# Patient Record
Sex: FEMALE | Race: WHITE | NOT HISPANIC OR LATINO | Employment: OTHER | URBAN - METROPOLITAN AREA
[De-identification: names, ages, dates, MRNs, and addresses within clinical notes are randomized per-mention and may not be internally consistent; named-entity substitution may affect disease eponyms.]

---

## 2018-08-29 ENCOUNTER — ANESTHESIA EVENT (OUTPATIENT)
Dept: OPERATING ROOM | Facility: HOSPITAL | Age: 63
Setting detail: HOSPITAL OUTPATIENT SURGERY
End: 2018-08-29
Payer: COMMERCIAL

## 2018-08-29 ENCOUNTER — APPOINTMENT (OUTPATIENT)
Dept: RADIOLOGY | Facility: HOSPITAL | Age: 63
Setting detail: HOSPITAL OUTPATIENT SURGERY
End: 2018-08-29
Attending: STUDENT IN AN ORGANIZED HEALTH CARE EDUCATION/TRAINING PROGRAM
Payer: COMMERCIAL

## 2018-08-29 ENCOUNTER — HOSPITAL ENCOUNTER (OUTPATIENT)
Facility: HOSPITAL | Age: 63
Discharge: HOME | End: 2018-08-30
Attending: ORTHOPAEDIC SURGERY | Admitting: ORTHOPAEDIC SURGERY
Payer: COMMERCIAL

## 2018-08-29 PROBLEM — Z96.652 S/P LEFT UNICOMPARTMENTAL KNEE REPLACEMENT: Status: ACTIVE | Noted: 2018-08-29

## 2018-08-29 LAB
GLUCOSE BLD-MCNC: 100 MG/DL (ref 70–99)
GLUCOSE BLD-MCNC: 87 MG/DL (ref 70–99)
POCT TEST: ABNORMAL
POCT TEST: NORMAL

## 2018-08-29 PROCEDURE — 63600000 HC DRUGS/DETAIL CODE: Performed by: STUDENT IN AN ORGANIZED HEALTH CARE EDUCATION/TRAINING PROGRAM

## 2018-08-29 PROCEDURE — 63600000 HC DRUGS/DETAIL CODE: Performed by: ORTHOPAEDIC SURGERY

## 2018-08-29 PROCEDURE — 97165 OT EVAL LOW COMPLEX 30 MIN: CPT | Mod: GO

## 2018-08-29 PROCEDURE — 0SRD0L9 REPLACEMENT OF LEFT KNEE JOINT WITH MEDIAL UNICONDYLAR SYNTHETIC SUBSTITUTE, CEMENTED, OPEN APPROACH: ICD-10-PCS | Performed by: ORTHOPAEDIC SURGERY

## 2018-08-29 PROCEDURE — 71000001 HC PACU PHASE 1 INITIAL 30MIN: Performed by: ORTHOPAEDIC SURGERY

## 2018-08-29 PROCEDURE — 37000010 HC ANESTHESIA SPINAL: Performed by: ORTHOPAEDIC SURGERY

## 2018-08-29 PROCEDURE — 71000011 HC PACU PHASE 1 EA ADDL MIN: Performed by: ORTHOPAEDIC SURGERY

## 2018-08-29 PROCEDURE — 63700000 HC SELF-ADMINISTRABLE DRUG

## 2018-08-29 PROCEDURE — 27200000 HC STERILE SUPPLY: Performed by: ORTHOPAEDIC SURGERY

## 2018-08-29 PROCEDURE — G8987 SELF CARE CURRENT STATUS: HCPCS | Mod: GO,CJ

## 2018-08-29 PROCEDURE — 37000010 ANESTHESIA SPINAL BLOCK: Performed by: ANESTHESIOLOGY

## 2018-08-29 PROCEDURE — 36000016 HC OR LEVEL 6 EA ADDL MIN: Performed by: ORTHOPAEDIC SURGERY

## 2018-08-29 PROCEDURE — 73560 X-RAY EXAM OF KNEE 1 OR 2: CPT | Mod: LT

## 2018-08-29 PROCEDURE — 37000003 ANESTHESIA PERIPHERAL BLOCK: Performed by: ANESTHESIOLOGY

## 2018-08-29 PROCEDURE — G8988 SELF CARE GOAL STATUS: HCPCS | Mod: GO,CI

## 2018-08-29 PROCEDURE — 36000006 HC OR LEVEL 6 INITIAL 30MIN: Performed by: ORTHOPAEDIC SURGERY

## 2018-08-29 PROCEDURE — C1776 JOINT DEVICE (IMPLANTABLE): HCPCS | Performed by: ORTHOPAEDIC SURGERY

## 2018-08-29 PROCEDURE — 25800000 HC PHARMACY IV SOLUTIONS: Performed by: ORTHOPAEDIC SURGERY

## 2018-08-29 PROCEDURE — 25000000 HC PHARMACY GENERAL: Performed by: ANESTHESIOLOGY

## 2018-08-29 PROCEDURE — 25800000 HC PHARMACY IV SOLUTIONS: Performed by: STUDENT IN AN ORGANIZED HEALTH CARE EDUCATION/TRAINING PROGRAM

## 2018-08-29 PROCEDURE — G8978 MOBILITY CURRENT STATUS: HCPCS | Mod: GP,CI | Performed by: PHYSICAL THERAPIST

## 2018-08-29 PROCEDURE — C1713 ANCHOR/SCREW BN/BN,TIS/BN: HCPCS | Performed by: ORTHOPAEDIC SURGERY

## 2018-08-29 PROCEDURE — 63600000 HC DRUGS/DETAIL CODE: Performed by: ANESTHESIOLOGY

## 2018-08-29 PROCEDURE — 97161 PT EVAL LOW COMPLEX 20 MIN: CPT | Mod: GP | Performed by: PHYSICAL THERAPIST

## 2018-08-29 PROCEDURE — 25800000 HC PHARMACY IV SOLUTIONS: Performed by: ANESTHESIOLOGY

## 2018-08-29 PROCEDURE — 63700000 HC SELF-ADMINISTRABLE DRUG: Performed by: STUDENT IN AN ORGANIZED HEALTH CARE EDUCATION/TRAINING PROGRAM

## 2018-08-29 PROCEDURE — 25000000 HC PHARMACY GENERAL: Performed by: ORTHOPAEDIC SURGERY

## 2018-08-29 PROCEDURE — G8979 MOBILITY GOAL STATUS: HCPCS | Mod: GP,CH | Performed by: PHYSICAL THERAPIST

## 2018-08-29 PROCEDURE — 97116 GAIT TRAINING THERAPY: CPT | Mod: GP | Performed by: PHYSICAL THERAPIST

## 2018-08-29 DEVICE — IMPLANTABLE DEVICE: Type: IMPLANTABLE DEVICE | Site: KNEE | Status: FUNCTIONAL

## 2018-08-29 DEVICE — CEMENT BONE SIMPLEX FULL DOSE: Type: IMPLANTABLE DEVICE | Site: KNEE | Status: FUNCTIONAL

## 2018-08-29 RX ORDER — AMOXICILLIN 250 MG
1 CAPSULE ORAL 2 TIMES DAILY
Status: DISCONTINUED | OUTPATIENT
Start: 2018-08-29 | End: 2018-08-30 | Stop reason: HOSPADM

## 2018-08-29 RX ORDER — PREGABALIN 75 MG/1
75 CAPSULE ORAL ONCE
Status: COMPLETED | OUTPATIENT
Start: 2018-08-29 | End: 2018-08-29

## 2018-08-29 RX ORDER — POLYETHYLENE GLYCOL 3350 17 G/17G
17 POWDER, FOR SOLUTION ORAL DAILY
Qty: 3 PACKET | Refills: 0
Start: 2018-08-29 | End: 2018-09-01

## 2018-08-29 RX ORDER — DEXTROSE 40 %
15-30 GEL (GRAM) ORAL AS NEEDED
Status: DISCONTINUED | OUTPATIENT
Start: 2018-08-29 | End: 2018-08-30 | Stop reason: HOSPADM

## 2018-08-29 RX ORDER — SODIUM CHLORIDE 9 MG/ML
INJECTION, SOLUTION INTRAVENOUS CONTINUOUS
Status: ACTIVE | OUTPATIENT
Start: 2018-08-29 | End: 2018-08-30

## 2018-08-29 RX ORDER — PROPOFOL 10 MG/ML
INJECTION, EMULSION INTRAVENOUS CONTINUOUS PRN
Status: DISCONTINUED | OUTPATIENT
Start: 2018-08-29 | End: 2018-08-29 | Stop reason: SURG

## 2018-08-29 RX ORDER — SODIUM CHLORIDE, SODIUM GLUCONATE, SODIUM ACETATE, POTASSIUM CHLORIDE AND MAGNESIUM CHLORIDE 30; 37; 368; 526; 502 MG/100ML; MG/100ML; MG/100ML; MG/100ML; MG/100ML
INJECTION, SOLUTION INTRAVENOUS CONTINUOUS PRN
Status: DISCONTINUED | OUTPATIENT
Start: 2018-08-29 | End: 2018-08-29 | Stop reason: SURG

## 2018-08-29 RX ORDER — ALUMINUM HYDROXIDE, MAGNESIUM HYDROXIDE, AND SIMETHICONE 1200; 120; 1200 MG/30ML; MG/30ML; MG/30ML
30 SUSPENSION ORAL EVERY 4 HOURS PRN
Status: DISCONTINUED | OUTPATIENT
Start: 2018-08-29 | End: 2018-08-30 | Stop reason: HOSPADM

## 2018-08-29 RX ORDER — FENTANYL CITRATE 50 UG/ML
50 INJECTION, SOLUTION INTRAMUSCULAR; INTRAVENOUS
Status: DISCONTINUED | OUTPATIENT
Start: 2018-08-29 | End: 2018-08-29 | Stop reason: HOSPADM

## 2018-08-29 RX ORDER — ONDANSETRON HYDROCHLORIDE 2 MG/ML
INJECTION, SOLUTION INTRAVENOUS AS NEEDED
Status: DISCONTINUED | OUTPATIENT
Start: 2018-08-29 | End: 2018-08-29 | Stop reason: SURG

## 2018-08-29 RX ORDER — ACETAMINOPHEN 325 MG/1
TABLET ORAL
Status: COMPLETED
Start: 2018-08-29 | End: 2018-08-29

## 2018-08-29 RX ORDER — FENTANYL CITRATE 50 UG/ML
INJECTION, SOLUTION INTRAMUSCULAR; INTRAVENOUS AS NEEDED
Status: DISCONTINUED | OUTPATIENT
Start: 2018-08-29 | End: 2018-08-29 | Stop reason: HOSPADM

## 2018-08-29 RX ORDER — ASPIRIN 81 MG/1
81 TABLET ORAL 2 TIMES DAILY
Qty: 60 TABLET | Refills: 0 | Status: SHIPPED | OUTPATIENT
Start: 2018-08-29 | End: 2018-09-28

## 2018-08-29 RX ORDER — EPHEDRINE SULFATE 50 MG/ML
INJECTION, SOLUTION INTRAVENOUS AS NEEDED
Status: DISCONTINUED | OUTPATIENT
Start: 2018-08-29 | End: 2018-08-29 | Stop reason: SURG

## 2018-08-29 RX ORDER — ACETAMINOPHEN 325 MG/1
650 TABLET ORAL
Status: DISCONTINUED | OUTPATIENT
Start: 2018-08-29 | End: 2018-08-30 | Stop reason: HOSPADM

## 2018-08-29 RX ORDER — DEXTROSE 50 % IN WATER (D50W) INTRAVENOUS SYRINGE
25 AS NEEDED
Status: DISCONTINUED | OUTPATIENT
Start: 2018-08-29 | End: 2018-08-30 | Stop reason: HOSPADM

## 2018-08-29 RX ORDER — NAPROXEN SODIUM 220 MG/1
81 TABLET, FILM COATED ORAL 2 TIMES DAILY
Status: DISCONTINUED | OUTPATIENT
Start: 2018-08-29 | End: 2018-08-30 | Stop reason: HOSPADM

## 2018-08-29 RX ORDER — IBUPROFEN 200 MG
16-32 TABLET ORAL AS NEEDED
Status: DISCONTINUED | OUTPATIENT
Start: 2018-08-29 | End: 2018-08-30 | Stop reason: HOSPADM

## 2018-08-29 RX ORDER — ACETAMINOPHEN 325 MG/1
650 TABLET ORAL EVERY 6 HOURS PRN
Qty: 40 TABLET | Refills: 0
Start: 2018-08-29 | End: 2018-09-28

## 2018-08-29 RX ORDER — CEFAZOLIN SODIUM 2 G/50ML
2 SOLUTION INTRAVENOUS ONCE
Status: COMPLETED | OUTPATIENT
Start: 2018-08-29 | End: 2018-08-29

## 2018-08-29 RX ORDER — ONDANSETRON 4 MG/1
4 TABLET, ORALLY DISINTEGRATING ORAL EVERY 8 HOURS PRN
Status: DISCONTINUED | OUTPATIENT
Start: 2018-08-29 | End: 2018-08-30 | Stop reason: HOSPADM

## 2018-08-29 RX ORDER — FENTANYL CITRATE 50 UG/ML
INJECTION, SOLUTION INTRAMUSCULAR; INTRAVENOUS AS NEEDED
Status: DISCONTINUED | OUTPATIENT
Start: 2018-08-29 | End: 2018-08-29 | Stop reason: SURG

## 2018-08-29 RX ORDER — PROPOFOL 10 MG/ML
INJECTION, EMULSION INTRAVENOUS AS NEEDED
Status: DISCONTINUED | OUTPATIENT
Start: 2018-08-29 | End: 2018-08-29 | Stop reason: SURG

## 2018-08-29 RX ORDER — AMOXICILLIN 250 MG
1 CAPSULE ORAL 2 TIMES DAILY
Qty: 60 TABLET | Refills: 0 | Status: SHIPPED | OUTPATIENT
Start: 2018-08-29 | End: 2018-09-28

## 2018-08-29 RX ORDER — LIDOCAINE HYDROCHLORIDE AND EPINEPHRINE 10; 10 UG/ML; MG/ML
INJECTION, SOLUTION INFILTRATION; PERINEURAL AS NEEDED
Status: DISCONTINUED | OUTPATIENT
Start: 2018-08-29 | End: 2018-08-29 | Stop reason: HOSPADM

## 2018-08-29 RX ORDER — LOSARTAN POTASSIUM 50 MG/1
50 TABLET ORAL DAILY
Status: DISCONTINUED | OUTPATIENT
Start: 2018-08-30 | End: 2018-08-30 | Stop reason: HOSPADM

## 2018-08-29 RX ORDER — DEXAMETHASONE SODIUM PHOSPHATE 4 MG/ML
INJECTION, SOLUTION INTRA-ARTICULAR; INTRALESIONAL; INTRAMUSCULAR; INTRAVENOUS; SOFT TISSUE AS NEEDED
Status: DISCONTINUED | OUTPATIENT
Start: 2018-08-29 | End: 2018-08-29 | Stop reason: SURG

## 2018-08-29 RX ORDER — ACETAMINOPHEN 325 MG/1
650 TABLET ORAL EVERY 4 HOURS PRN
Status: DISCONTINUED | OUTPATIENT
Start: 2018-08-29 | End: 2018-08-30 | Stop reason: HOSPADM

## 2018-08-29 RX ORDER — OXYCODONE HYDROCHLORIDE 5 MG/1
5-10 TABLET ORAL EVERY 4 HOURS PRN
Status: DISCONTINUED | OUTPATIENT
Start: 2018-08-29 | End: 2018-08-30 | Stop reason: HOSPADM

## 2018-08-29 RX ORDER — KETOROLAC TROMETHAMINE 15 MG/ML
15 INJECTION, SOLUTION INTRAMUSCULAR; INTRAVENOUS
Status: DISCONTINUED | OUTPATIENT
Start: 2018-08-29 | End: 2018-08-30 | Stop reason: HOSPADM

## 2018-08-29 RX ORDER — SCOPOLAMINE 1 MG/3D
1 PATCH, EXTENDED RELEASE TRANSDERMAL ONCE
Status: DISCONTINUED | OUTPATIENT
Start: 2018-08-29 | End: 2018-08-29

## 2018-08-29 RX ORDER — DIPHENHYDRAMINE HYDROCHLORIDE 50 MG/ML
25 INJECTION INTRAMUSCULAR; INTRAVENOUS EVERY 6 HOURS PRN
Status: DISCONTINUED | OUTPATIENT
Start: 2018-08-29 | End: 2018-08-30 | Stop reason: HOSPADM

## 2018-08-29 RX ORDER — PREGABALIN 75 MG/1
CAPSULE ORAL
Status: COMPLETED
Start: 2018-08-29 | End: 2018-08-29

## 2018-08-29 RX ORDER — ACETAMINOPHEN 325 MG/1
975 TABLET ORAL ONCE
Status: COMPLETED | OUTPATIENT
Start: 2018-08-29 | End: 2018-08-29

## 2018-08-29 RX ORDER — ROPIVACAINE HYDROCHLORIDE 2 MG/ML
INJECTION, SOLUTION EPIDURAL; INFILTRATION; PERINEURAL AS NEEDED
Status: DISCONTINUED | OUTPATIENT
Start: 2018-08-29 | End: 2018-08-29 | Stop reason: HOSPADM

## 2018-08-29 RX ORDER — BUPIVACAINE HYDROCHLORIDE 7.5 MG/ML
INJECTION, SOLUTION INTRASPINAL AS NEEDED
Status: DISCONTINUED | OUTPATIENT
Start: 2018-08-29 | End: 2018-08-29 | Stop reason: SURG

## 2018-08-29 RX ORDER — ONDANSETRON HYDROCHLORIDE 2 MG/ML
4 INJECTION, SOLUTION INTRAVENOUS EVERY 8 HOURS PRN
Status: DISCONTINUED | OUTPATIENT
Start: 2018-08-29 | End: 2018-08-30 | Stop reason: HOSPADM

## 2018-08-29 RX ORDER — CEFAZOLIN SODIUM 2 G/50ML
SOLUTION INTRAVENOUS
Status: COMPLETED
Start: 2018-08-29 | End: 2018-08-29

## 2018-08-29 RX ORDER — BISACODYL 10 MG/1
10 SUPPOSITORY RECTAL DAILY PRN
Status: DISCONTINUED | OUTPATIENT
Start: 2018-08-29 | End: 2018-08-30 | Stop reason: HOSPADM

## 2018-08-29 RX ORDER — CELECOXIB 200 MG/1
400 CAPSULE ORAL ONCE
Status: COMPLETED | OUTPATIENT
Start: 2018-08-29 | End: 2018-08-29

## 2018-08-29 RX ORDER — HYDROMORPHONE HYDROCHLORIDE 1 MG/ML
0.5 INJECTION, SOLUTION INTRAMUSCULAR; INTRAVENOUS; SUBCUTANEOUS
Status: DISCONTINUED | OUTPATIENT
Start: 2018-08-29 | End: 2018-08-29 | Stop reason: HOSPADM

## 2018-08-29 RX ORDER — ALBUTEROL SULFATE 90 UG/1
2 INHALANT RESPIRATORY (INHALATION) EVERY 6 HOURS PRN
Status: DISCONTINUED | OUTPATIENT
Start: 2018-08-29 | End: 2018-08-30 | Stop reason: HOSPADM

## 2018-08-29 RX ORDER — SCOPOLAMINE 1 MG/3D
PATCH, EXTENDED RELEASE TRANSDERMAL
Status: DISCONTINUED
Start: 2018-08-29 | End: 2018-08-30 | Stop reason: HOSPADM

## 2018-08-29 RX ORDER — SODIUM CHLORIDE, SODIUM GLUCONATE, SODIUM ACETATE, POTASSIUM CHLORIDE AND MAGNESIUM CHLORIDE 30; 37; 368; 526; 502 MG/100ML; MG/100ML; MG/100ML; MG/100ML; MG/100ML
125 INJECTION, SOLUTION INTRAVENOUS CONTINUOUS
Status: DISCONTINUED | OUTPATIENT
Start: 2018-08-29 | End: 2018-08-30 | Stop reason: HOSPADM

## 2018-08-29 RX ORDER — POLYETHYLENE GLYCOL 3350 17 G/17G
17 POWDER, FOR SOLUTION ORAL DAILY
Status: DISCONTINUED | OUTPATIENT
Start: 2018-08-30 | End: 2018-08-30 | Stop reason: HOSPADM

## 2018-08-29 RX ORDER — CEFAZOLIN SODIUM 2 G/50ML
2 SOLUTION INTRAVENOUS
Status: COMPLETED | OUTPATIENT
Start: 2018-08-29 | End: 2018-08-29

## 2018-08-29 RX ORDER — FENTANYL CITRATE 50 UG/ML
50 INJECTION, SOLUTION INTRAMUSCULAR; INTRAVENOUS ONCE
Status: COMPLETED | OUTPATIENT
Start: 2018-08-29 | End: 2018-08-29

## 2018-08-29 RX ORDER — DEXAMETHASONE SODIUM PHOSPHATE 4 MG/ML
8 INJECTION, SOLUTION INTRA-ARTICULAR; INTRALESIONAL; INTRAMUSCULAR; INTRAVENOUS; SOFT TISSUE ONCE
Status: COMPLETED | OUTPATIENT
Start: 2018-08-30 | End: 2018-08-30

## 2018-08-29 RX ORDER — CELECOXIB 200 MG/1
CAPSULE ORAL
Status: COMPLETED
Start: 2018-08-29 | End: 2018-08-29

## 2018-08-29 RX ORDER — ONDANSETRON HYDROCHLORIDE 2 MG/ML
4 INJECTION, SOLUTION INTRAVENOUS
Status: DISCONTINUED | OUTPATIENT
Start: 2018-08-29 | End: 2018-08-29 | Stop reason: HOSPADM

## 2018-08-29 RX ORDER — DIPHENHYDRAMINE HCL 25 MG
25 CAPSULE ORAL EVERY 6 HOURS PRN
Status: DISCONTINUED | OUTPATIENT
Start: 2018-08-29 | End: 2018-08-30 | Stop reason: HOSPADM

## 2018-08-29 RX ORDER — OXYCODONE HYDROCHLORIDE 5 MG/1
5-10 TABLET ORAL EVERY 4 HOURS PRN
Qty: 40 TABLET | Refills: 0
Start: 2018-08-29 | End: 2018-09-03

## 2018-08-29 RX ORDER — SODIUM CHLORIDE 9 MG/ML
INJECTION, SOLUTION INTRAVENOUS CONTINUOUS PRN
Status: DISCONTINUED | OUTPATIENT
Start: 2018-08-29 | End: 2018-08-29 | Stop reason: SURG

## 2018-08-29 RX ADMIN — FENTANYL CITRATE 50 MCG: 50 INJECTION INTRAMUSCULAR; INTRAVENOUS at 07:06

## 2018-08-29 RX ADMIN — SCOPOLAMINE 1 PATCH: 1 PATCH, EXTENDED RELEASE TRANSDERMAL at 06:46

## 2018-08-29 RX ADMIN — CELECOXIB 400 MG: 200 CAPSULE ORAL at 06:23

## 2018-08-29 RX ADMIN — SODIUM CHLORIDE 500 ML: 9 INJECTION, SOLUTION INTRAVENOUS at 19:50

## 2018-08-29 RX ADMIN — CEFAZOLIN SODIUM 2 G: 2 SOLUTION INTRAVENOUS at 15:01

## 2018-08-29 RX ADMIN — ACETAMINOPHEN 650 MG: 325 TABLET, FILM COATED ORAL at 17:47

## 2018-08-29 RX ADMIN — ACETAMINOPHEN 650 MG: 325 TABLET, FILM COATED ORAL at 11:54

## 2018-08-29 RX ADMIN — BUPIVACAINE HYDROCHLORIDE IN DEXTROSE 2 ML: 7.5 INJECTION, SOLUTION SUBARACHNOID at 07:16

## 2018-08-29 RX ADMIN — PROPOFOL 75 MCG/KG/MIN: 10 INJECTION, EMULSION INTRAVENOUS at 07:16

## 2018-08-29 RX ADMIN — VANCOMYCIN HYDROCHLORIDE 1000 MG: 1 INJECTION, POWDER, LYOPHILIZED, FOR SOLUTION INTRAVENOUS at 06:32

## 2018-08-29 RX ADMIN — SENNOSIDES AND DOCUSATE SODIUM 1 TABLET: 8.6; 5 TABLET ORAL at 19:46

## 2018-08-29 RX ADMIN — ACETAMINOPHEN 650 MG: 325 TABLET, FILM COATED ORAL at 23:47

## 2018-08-29 RX ADMIN — ASPIRIN 81 MG 81 MG: 81 TABLET ORAL at 19:46

## 2018-08-29 RX ADMIN — OXYCODONE HYDROCHLORIDE 10 MG: 5 TABLET ORAL at 14:02

## 2018-08-29 RX ADMIN — SODIUM CHLORIDE, SODIUM GLUCONATE, SODIUM ACETATE, POTASSIUM CHLORIDE AND MAGNESIUM CHLORIDE: 526; 502; 368; 37; 30 INJECTION, SOLUTION INTRAVENOUS at 08:16

## 2018-08-29 RX ADMIN — OXYCODONE HYDROCHLORIDE 10 MG: 5 TABLET ORAL at 17:46

## 2018-08-29 RX ADMIN — PREGABALIN 75 MG: 75 CAPSULE ORAL at 06:24

## 2018-08-29 RX ADMIN — ACETAMINOPHEN 975 MG: 325 TABLET, FILM COATED ORAL at 06:23

## 2018-08-29 RX ADMIN — KETOROLAC TROMETHAMINE 15 MG: 15 INJECTION, SOLUTION INTRAMUSCULAR; INTRAVENOUS at 23:47

## 2018-08-29 RX ADMIN — TRANEXAMIC ACID 1100 MG: 100 INJECTION, SOLUTION INTRAVENOUS at 07:22

## 2018-08-29 RX ADMIN — SODIUM CHLORIDE 1000 MG: 900 INJECTION, SOLUTION INTRAVENOUS at 18:32

## 2018-08-29 RX ADMIN — SODIUM CHLORIDE: 900 INJECTION, SOLUTION INTRAVENOUS at 06:40

## 2018-08-29 RX ADMIN — CEFAZOLIN SODIUM 2 G: 2 SOLUTION INTRAVENOUS at 07:16

## 2018-08-29 RX ADMIN — KETOROLAC TROMETHAMINE 15 MG: 15 INJECTION, SOLUTION INTRAMUSCULAR; INTRAVENOUS at 11:55

## 2018-08-29 RX ADMIN — EPHEDRINE SULFATE 5 MG: 50 INJECTION INTRAVENOUS at 07:26

## 2018-08-29 RX ADMIN — EPHEDRINE SULFATE 5 MG: 50 INJECTION INTRAVENOUS at 07:44

## 2018-08-29 RX ADMIN — DEXAMETHASONE SODIUM PHOSPHATE 4 MG: 4 INJECTION, SOLUTION INTRAMUSCULAR; INTRAVENOUS at 07:24

## 2018-08-29 RX ADMIN — KETOROLAC TROMETHAMINE 15 MG: 15 INJECTION, SOLUTION INTRAMUSCULAR; INTRAVENOUS at 17:49

## 2018-08-29 RX ADMIN — ONDANSETRON 4 MG: 2 INJECTION INTRAMUSCULAR; INTRAVENOUS at 08:32

## 2018-08-29 RX ADMIN — BUPIVACAINE HYDROCHLORIDE IN DEXTROSE 2 ML: 7.5 INJECTION, SOLUTION SUBARACHNOID at 07:07

## 2018-08-29 RX ADMIN — OXYCODONE HYDROCHLORIDE 5 MG: 5 TABLET ORAL at 22:40

## 2018-08-29 RX ADMIN — FENTANYL CITRATE 50 MCG: 50 INJECTION INTRAMUSCULAR; INTRAVENOUS at 07:03

## 2018-08-29 RX ADMIN — PROPOFOL 30 MG: 10 INJECTION, EMULSION INTRAVENOUS at 07:16

## 2018-08-29 RX ADMIN — SODIUM CHLORIDE: 9 INJECTION, SOLUTION INTRAVENOUS at 11:58

## 2018-08-29 RX ADMIN — ACETAMINOPHEN 975 MG: 325 TABLET ORAL at 06:23

## 2018-08-29 ASSESSMENT — PAIN SCALES - GENERAL: PAIN_LEVEL: 3

## 2018-08-29 NOTE — ANESTHESIA PREPROCEDURE EVALUATION
Anesthesia ROS/MED HX    Anesthesia History    History of anesthetic complications  Pulmonary    asthma  Neuro/Psych - neg  Cardiovascular   hypertension  Hematological    anemia  GI/Hepatic- neg  Musculoskeletal   Osteoarthritis  Endo/Other   Hypothyroidism      Past Surgical History:   Procedure Laterality Date   • COLONOSCOPY     • KNEE ARTHROSCOPY Left        Physical Exam    Airway   Mallampati: II   TM distance: >3 FB   Neck ROM: full  Cardiovascular - normal   Rhythm: regular   Rate: normal  Pulmonary - normal   clear to auscultation  Dental - normal        Anesthesia Plan    Plan: spinal     Lines and Monitors: PIV   ASA 2  Anesthetic plan and risks discussed with: patient

## 2018-08-29 NOTE — ANESTHESIA PROCEDURE NOTES
Spinal Block    Patient location during procedure: OR  Start time: 8/29/2018 7:03 AM  End time: 8/29/2018 7:07 AM  Staffing  Anesthesiologist: ANGELICA WALKER  Performed: anesthesiologist   Reason for block: at surgeon's request  Preanesthetic Checklist  Completed: patient identified, surgical consent, pre-op evaluation, timeout performed, IV checked, risks and benefits discussed, monitors and equipment checked and sterile field maintained during procedure  Spinal Block  Patient position: sitting  Prep: ChloraPrep and site prepped and draped  Patient monitoring: heart rate, cardiac monitor, continuous pulse ox and blood pressure  Approach: midline  Location: L3-4  Injection technique: single-shot  Needle  Needle type: Sprotte   Needle gauge: 24 G  Needle length: 3.5 in  Assessment  Sensory level: T4  Events: cerebrospinal fluid  Additional Notes  Procedure well tolerated. Vital signs stable.

## 2018-08-29 NOTE — PROGRESS NOTES
Patient: Betina Malhotra  Location: [facility] 3B 3028  MRN: [id number]  Today's date: 8/29/2018        Prior Living Environment        Ind LOF PTA w/ ADLs and functional transfers. Supportive spouse. 2 story house - 5 steps. Stall shower. Has loner walker.    Prior Level of Function              OT Evaluation - 08/29/18 1310        Session Details    Document Type initial evaluation    Mode of Treatment occupational therapy       Time Calculation    Start Time 1300    Stop Time 1310    Time Calculation (min) 10 min       General Information    Patient Profile Reviewed? 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        Orientation Log    [facility] 3-->spontaneous/free recall    Kind of Place 3-->spontaneous/free recall    Name of [facility] 3-->spontaneous/free recall    Month 3-->spontaneous/free recall    Date 3-->spontaneous/free recall    Year 3-->spontaneous/free recall    Day of Week 3-->spontaneous/free recall    Clock Time 3-->spontaneous/free recall    Etiology/Event 3-->spontaneous/free recall    Pathology Deficits 3-->spontaneous/free recall    Total Score 30       Bed Mobility    Bed Mobility bed mobility activities    [place], Supine to Sit moderate assist (50% patient effort)    Comment (Bed Mobility) pain and weakness in L LE s/p procedure       Toilet Transfer    [place], Toilet Transfer 1 person assist;minimum assist (75% patient effort)    Assistive Device walker, front-wheeled    Comment Min A w/ vcs for technique and safety       Lower Body Dressing    Lower Body Dressing Self-Performance dons/doffs right sock;dons/doffs left sock    Lower Body Dressing [place] minimum assist (75% or more patient effort)    Comment limited by pain and weakness L LE       OT Clinical Impression    Patient's Goals For Discharge return to all previous roles/activities    Family Goals For Discharge patient able to return to all previous activities/roles    Plan For Care Reviewed:  Occupational Therapy OT plan for care discussed with patient    System Pathology/Pathophysiology Noted musculoskeletal;neuromuscular    Impairments Found (OT Eval) ROM (range of motion);motor function;muscle performance    Functional Limitations in Following Categories self-care;home management    Disability: Inability to Perform Actions/Activities of Required Roles (OT) community/leisure    Activity tolerance compared to PLOF Participating in >75% of school/work/household responsibilities and leisure activities, may have accommodations    OT Frequency of Treatment 5-7 times per week    Estimated Length of Stay 1 week    Problem List: Occupational Therapy decreased flexibility;motor control impaired;postural control impaired    Activity Limitations Related to Problem List ambulation not performed safely;BADL activities not performed adequately or safely    Referral Made Based on Need and MD Input: Occupational Therapy occupational therapist    Expected Discharge Disposition home with assist    Daily Outcome Statement Ax1 for lower body ADLs and functional transfers at present LOF                        Education provided this session. See the Patient Education summary report for full details.

## 2018-08-29 NOTE — OP NOTE
REPORT TYPE:  Operative Note    DATE OF OPERATION:  08/29/2018      PREOPERATIVE DIAGNOSIS:  Left knee severe lateral compartment osteoarthritis.    POSTOPERATIVE DIAGNOSIS:  Left knee severe lateral compartment osteoarthritis.    PROCEDURE:  Left knee unicompartmental knee replacement with a J and J sigma knee, size 3 left lateral femur, size 2 tibia and 7 mm polyethylene.    SURGEON:  Yunier Penn MD.    ASSISTANT:  Shauna Milligan, PA.    ANESTHESIA:  General endotracheal.    COMPLICATIONS:  None.    ESTIMATED BLOOD LOSS:  Minimal.    TOURNIQUET TIME:  70 minutes.    INDICATIONS:  The patient is a 62-year-old female with severe left knee pain.  She had lateral compartment osteoarthritis.  The remainder of her knee was intact.  She thoroughly understood the risks and benefits of operative intervention and wished to   proceed with surgery.    DESCRIPTION OF PROCEDURE:  Patient was identified and brought to the operating room.  Preoperative IV antibiotics were given.  General anesthesia was induced without difficulty.  The left knee and lower extremity were prepped and draped in the usual   sterile fashion.  A laterally based midline incision was made through the skin and subcutaneous tissue.  An arthrotomy was performed with care to protect all the normal cartilage surfaces.  There was noted to be severe lateral compartment arthritis but   the patellofemoral and medial compartment were intact.  The lateral side was exposed.  The patellar tendon could be easily subluxed to place a tibial component in appropriate position.  The anterior horn of the lateral meniscus was resected and a tibial   guide was placed.  The tibial resection was performed to take 4 mm off the tibial side with a sagittal cut in line with the horns of the meniscus.  A trial tibia was then placed.  It was noted that there was excellent stability with about 2 mm of laxity   in flexion with good stability in extension.  A luis was placed for  the extension cut and the distal femoral cut was placed.  The rotation was marked and the remaining femoral cuts were made.  Once this was complete, there was noted to be excellent   stability and alignment with the trial.  The lug holes were drilled and the tibial component was prepared as well.  The final components were then cemented into place.  There was noted to be excellent range of motion and stability.  The wounds were   thoroughly irrigated.  The arthrotomy was closed with #1 Vicryl suture, subcutaneous tissue with 2-0 Vicryl and skin was closed with running Monocryl.  Steri-Strips and sterile dressing were placed.  We will got immediate range of motion and   weightbearing.  She tolerated the procedure well.    Please note my assistant for surgery was Shauna Milligan, PA.  She was required for assistance throughout the case including retraction, assisted with placement of the components and assisted with closure.    As the attending surgeon, I was present and performed all critical portions of the surgery.      CHARO MARK MD        CC:     DD: 08/29/2018 14:58  DT: 08/29/2018 19:48  Voice ID: [id number]/Report ID: [id number]  [email or web address]

## 2018-08-29 NOTE — PROGRESS NOTES
Patient: Betina Malhotra  Location: [facility] 3B 3028  MRN: [id number]  Today's date: 8/29/2018  VSS, NAD, Nurse notified, patient seated in recliner w/ BLE elevated s/p PT session, SCD's on, clip alarm on sleeve      Prior Living Environment - [place] home, lives w/ spouse, no DME PTA, owns RW, SPC             Prior Level of Function - indep w/ all func mob and amb w/o AD,                PT Evaluation - 08/29/18 1300        Session Details    Document Type initial evaluation    Mode of Treatment physical therapy       Time Calculation    Start Time 1300    Stop Time 1324    Time Calculation (min) 24 min       General Information    Patient Profile Reviewed? 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     Existing Precautions/Restrictions weight bearing   WBAT LLE, SCD's, order for OOB today        Weight Bearing Status    Left LE Weight Bearing Status weight bearing as tolerated       Orientation Log    [facility] 3-->spontaneous/free recall    Kind of Place 3-->spontaneous/free recall    Name of [facility] 3-->spontaneous/free recall    Month 3-->spontaneous/free recall    Date 3-->spontaneous/free recall    Year 3-->spontaneous/free recall    Day of Week 3-->spontaneous/free recall    Clock Time 3-->spontaneous/free recall    Etiology/Event 3-->spontaneous/free recall    Pathology Deficits 3-->spontaneous/free recall    Total Score 30       Cognition/Psychosocial    Safety Awareness intact       Sensory    Sensory General Assessment no sensation deficits identified       Range of Motion (ROM)    General Range of Motion no range of motion deficits identified       Manual Muscle Testing (MMT)    General MMT Assessment no strength deficits identified       Bed Mobility    Bed Mobility supine to sit    [place], Supine to Sit supervision    [place], Sit to Supine supervision    Assistive Device  (Bed Mobility) head of bed elevated       Transfers    Maintains Weight Bearing Status (Transfers) able to maintain weight bearing status       Sit to Stand Transfer    [place], Sit to Stand Transfer supervision       Stand to Sit Transfer    [place], Stand to Sit Transfer supervision       Gait Analysis/Training    Gait/Stairs Locomotion Gait Training (Group);Stairs Training (Group)       Gait Training    [place], Gait supervision    Assistive Device walker, front-wheeled    Distance in Feet 110 feet    Gait Pattern Utilized step-through    Gait Deviations Identified decreased rai;decreased gait speed;decreased heel strike;decreased step length;decreased stride length    Maintains Weight Bearing Status able to maintain weight bearing status    Comment amb 110 ft w/ RW on 3B at S for vcs, VSS, NAD, Nsg aware; amb 200 ft later in PM w/ RW at S for vcs, VSS, NAD, Nsg aware        Stairs Training    [place], Stairs supervision    Assistive Device cane, straight    Handrail Location left side (ascending);right side (descending)    Number of Stairs 4    Ascending Stairs Technique step-to-step    Descending Stairs Technique step-to-step    Comment amb up/down 4 stairs w/ SPC and 1 rail at S for vcs, VSS, NAD, Nsg aware        PT Clinical Impression    Patient's Goals For Discharge return home;take care of myself at home;return to all previous roles/activities    Family Goals For Discharge patient able to provide self-care independently;patient able to return to all previous activities/roles    Plan For Care Reviewed: Physical Therapy family voices agreement with PT plan for care;patient voices agreement with PT plan for care    System Pathology/Pathophysiology Noted musculoskeletal    Impairments Found (PT Eval) gait, locomotion, and balance;muscle performance;ROM (range of motion)    Functional Limitations in Following Categories (PT Eval) self-care;home management;community/leisure    Rehab  Potential/Prognosis good, to achieve stated therapy goals    PT Frequency of Treatment 5-7 times per week    Estimated Length of Stay --   1-2 days     Problem List decreased ROM;decreased strength;pain    Activity Limitations Related to Problem List BADL activities not performed adequately or safely;functional mobility not performed adequately or safely for household activity;functional mobility not performed adequately or safely for community activity;IADLs not performed adequately or safely    Anticipated Equipment Needs at Discharge bedside commode;front wheeled walker;shower chair    Expected Discharge Disposition home with assist;home with outpatient services    Daily Outcome Statement S overall for vcs, patient did well overall on evaluation and treatment sessions this PM, she reports that she wants to stay 1 more night before dc home (2 hr drive) tomorrow, assist from spouse and OP PT when cleared by surgeon; she owns RW and SPC already                         Education provided this session. See the Patient Education summary report for full details.

## 2018-08-29 NOTE — ANESTHESIA PROCEDURE NOTES
Peripheral Block    Start time: 8/29/2018 7:09 AM  End time: 8/29/2018 7:12 AM  Reason for block: procedure for pain, at surgeon's request and post-op pain management  Staffing  Anesthesiologist: ANGELICA WALKER  Performed: anesthesiologist   Preanesthetic Checklist  Completed: patient identified, site marked, surgical consent, pre-op evaluation, timeout performed, IV checked, risks and benefits discussed and monitors and equipment checked  Peripheral Block  Patient position: supine  Prep: ChloraPrep  Patient monitoring: heart rate, cardiac monitor, continuous pulse ox and blood pressure  Block type: adductor canal  Laterality: left  Injection technique: single-shot      Procedures: ultrasound guided  Ultrasound image captured and stored.        Local infiltration: ropivacaine  Infiltration strength: 0.5 %  Dose: 15 mL  Needle  Needle gauge: 22 G  Needle length: 3 1/8 in  Needle localization: ultrasound guidance and nerve stimulator  Assessment  Injection assessment: negative aspiration for heme, no paresthesia on injection, incremental injection and local visualized surrounding nerve on ultrasound  Paresthesia pain: none  Heart rate change: no  Slow fractionated injection: yes

## 2018-08-29 NOTE — ANESTHESIA POSTPROCEDURE EVALUATION
Patient: Betina Malhotra    Procedure Summary     Date:  08/29/18 Room / Location:  [facility] OR  / [facility] OR    Anesthesia Start:  0700 Anesthesia Stop:  0856    Procedure:  Total Knee Replacement, Knee Unicompartmental Arthroplasty (Left Knee) Diagnosis:  (Unilateral Primary Osteoarthritis)    Surgeon:  Yunier Penn MD Responsible Provider:  Eleni Duenas MD    Anesthesia Type:  spinal ASA Status:  2          Anesthesia Type: spinal  PACU Vitals  8/29/2018 0851 - 8/29/2018 0951      8/29/2018 0858 8/29/2018 0900 8/29/2018 0910 8/29/2018 0920    BP: (!)  88/54 (!)  98/55 109/64 115/69    Temp: 36.5 °C (97.7 °F) - - -    Pulse: (!)  56 (!)  59 (!)  57 (!)  52    Resp: 16 15 (!)  21 16    SpO2: 100 % 100 % 99 % 100 %              8/29/2018 0930 8/29/2018 0940 8/29/2018 0950       BP: 114/68 114/66 123/71     Temp: - - -     Pulse: (!)  57 (!)  52 (!)  48     Resp: (!)  23 14 13     SpO2: 99 % 98 % 100 %             Anesthesia Post Evaluation    Pain score: 3  Pain management: adequate  Patient location during evaluation: PACU  Patient participation: waiting for patient participation  Level of consciousness: awake and alert  Cardiovascular status: acceptable  Airway Patency: adequate  Respiratory status: acceptable  Hydration status: acceptable  Anesthetic complications: no

## 2018-08-29 NOTE — PROGRESS NOTES
Orthopaedic Surgery Postoperative Check    [S]  Pt doing well postoperatively.  Pt denies CP or SOB.    [O]    Vitals:    08/29/18 0920   BP: 115/69   Pulse: (!) 52   Resp: 16   Temp:    SpO2: 100%       CBC Results     No lab values to display.          Physical Exam    LLE  Inspection: No gross deformity. Skin in tact. Dressing CDI  Neurovascular: Palpable DP Pulse. Minimal SILT in Sural, Saphenous, Tibial, SPN, and DPN distributions 2/2 to spinal  Motor: Minimal +PF/DF/EHL/TA 2/2 to spinal  Compartments: Soft and Compressible    AP: 62 y.o. female s/p L uicompartmental knee arthroplasty    -- PT/OT  -- WBS: WBAT BLLE OOB  -- DVT: ASA  -- Pain Control  -- Monitor neurovascular status    Maricruz Edouard MD

## 2018-08-29 NOTE — PLAN OF CARE
Problem: Knee Arthroplasty (Total, Partial) (Adult)  Goal: Signs and Symptoms of Listed Potential Problems Will be Absent, Minimized or Managed (Knee Arthroplasty)  Outcome: Ongoing (interventions implemented as appropriate)    Goal: Anesthesia/Sedation Recovery  Outcome: Ongoing (interventions implemented as appropriate)

## 2018-08-30 VITALS
WEIGHT: 125 LBS | HEART RATE: 46 BPM | DIASTOLIC BLOOD PRESSURE: 71 MMHG | TEMPERATURE: 97.8 F | HEIGHT: 63 IN | SYSTOLIC BLOOD PRESSURE: 132 MMHG | RESPIRATION RATE: 16 BRPM | OXYGEN SATURATION: 97 % | BODY MASS INDEX: 22.15 KG/M2

## 2018-08-30 LAB
ANION GAP SERPL CALC-SCNC: 7 MEQ/L (ref 3–15)
BUN SERPL-MCNC: 19 MG/DL (ref 8–20)
CALCIUM SERPL-MCNC: 8.3 MG/DL (ref 8.9–10.3)
CHLORIDE SERPL-SCNC: 109 MMOL/L (ref 98–109)
CO2 SERPL-SCNC: 24 MMOL/L (ref 22–32)
CREAT SERPL-MCNC: 1 MG/DL (ref 0.6–1.1)
ERYTHROCYTE [DISTWIDTH] IN BLOOD BY AUTOMATED COUNT: 13.6 % (ref 11.7–14.4)
GFR SERPL CREATININE-BSD FRML MDRD: 56.2 ML/MIN/1.73M*2
GLUCOSE SERPL-MCNC: 103 MG/DL (ref 70–99)
HCT VFR BLDCO AUTO: 28 % (ref 35–45)
HGB BLD-MCNC: 9.3 G/DL (ref 11.8–15.7)
MCH RBC QN AUTO: 29.5 PG (ref 28–33.2)
MCHC RBC AUTO-ENTMCNC: 33.2 G/DL (ref 32.2–35.5)
MCV RBC AUTO: 88.9 FL (ref 83–98)
PDW BLD AUTO: 12.5 FL (ref 9.4–12.3)
PLATELET # BLD AUTO: 175 K/UL (ref 150–369)
POTASSIUM SERPL-SCNC: 4.2 MMOL/L (ref 3.6–5.1)
RBC # BLD AUTO: 3.15 M/UL (ref 3.93–5.22)
SODIUM SERPL-SCNC: 140 MMOL/L (ref 136–144)
WBC # BLD AUTO: 6.1 K/UL (ref 3.8–10.5)

## 2018-08-30 PROCEDURE — 97116 GAIT TRAINING THERAPY: CPT | Mod: GP

## 2018-08-30 PROCEDURE — 80048 BASIC METABOLIC PNL TOTAL CA: CPT | Performed by: STUDENT IN AN ORGANIZED HEALTH CARE EDUCATION/TRAINING PROGRAM

## 2018-08-30 PROCEDURE — 25800000 HC PHARMACY IV SOLUTIONS: Performed by: STUDENT IN AN ORGANIZED HEALTH CARE EDUCATION/TRAINING PROGRAM

## 2018-08-30 PROCEDURE — 97110 THERAPEUTIC EXERCISES: CPT | Mod: GP

## 2018-08-30 PROCEDURE — 63600000 HC DRUGS/DETAIL CODE: Performed by: STUDENT IN AN ORGANIZED HEALTH CARE EDUCATION/TRAINING PROGRAM

## 2018-08-30 PROCEDURE — 85027 COMPLETE CBC AUTOMATED: CPT | Performed by: STUDENT IN AN ORGANIZED HEALTH CARE EDUCATION/TRAINING PROGRAM

## 2018-08-30 PROCEDURE — 36415 COLL VENOUS BLD VENIPUNCTURE: CPT | Performed by: STUDENT IN AN ORGANIZED HEALTH CARE EDUCATION/TRAINING PROGRAM

## 2018-08-30 PROCEDURE — 63700000 HC SELF-ADMINISTRABLE DRUG: Performed by: STUDENT IN AN ORGANIZED HEALTH CARE EDUCATION/TRAINING PROGRAM

## 2018-08-30 RX ORDER — THYROID 30 MG/1
45 TABLET ORAL DAILY
Status: DISCONTINUED | OUTPATIENT
Start: 2018-08-30 | End: 2018-08-30 | Stop reason: HOSPADM

## 2018-08-30 RX ADMIN — OXYCODONE HYDROCHLORIDE 10 MG: 5 TABLET ORAL at 05:03

## 2018-08-30 RX ADMIN — ASPIRIN 81 MG 81 MG: 81 TABLET ORAL at 08:12

## 2018-08-30 RX ADMIN — SODIUM CHLORIDE: 9 INJECTION, SOLUTION INTRAVENOUS at 03:25

## 2018-08-30 RX ADMIN — POLYETHYLENE GLYCOL 3350 17 G: 17 POWDER, FOR SOLUTION ORAL at 08:12

## 2018-08-30 RX ADMIN — KETOROLAC TROMETHAMINE 15 MG: 15 INJECTION, SOLUTION INTRAMUSCULAR; INTRAVENOUS at 06:04

## 2018-08-30 RX ADMIN — DEXAMETHASONE SODIUM PHOSPHATE 8 MG: 4 INJECTION, SOLUTION INTRAMUSCULAR; INTRAVENOUS at 06:04

## 2018-08-30 RX ADMIN — SENNOSIDES AND DOCUSATE SODIUM 1 TABLET: 8.6; 5 TABLET ORAL at 08:12

## 2018-08-30 RX ADMIN — OXYCODONE HYDROCHLORIDE 10 MG: 5 TABLET ORAL at 11:49

## 2018-08-30 RX ADMIN — ACETAMINOPHEN 650 MG: 325 TABLET, FILM COATED ORAL at 06:04

## 2018-08-30 RX ADMIN — LEVOTHYROXINE, LIOTHYRONINE 45 MG: 19; 4.5 TABLET ORAL at 10:58

## 2018-08-30 RX ADMIN — OXYCODONE HYDROCHLORIDE 5 MG: 5 TABLET ORAL at 00:04

## 2018-08-30 NOTE — PROGRESS NOTES
Orthopaedic Surgery Progress Note    [S]  Pt doing well postoperatively.  Pt denies any numbness or parathesias.  Pt denies CP or SOB.    [O]    Vitals:    08/30/18 0508   BP: 102/68   Pulse: (!) 52   Resp: 16   Temp: 36.3 °C (97.3 °F)   SpO2: 94%       CBC Results     No lab values to display.          Physical Exam    General  NAD  Resting Comfortably in Bed  AAOx3    LLE  Inspection: No gross deformity. Skin in tact. Ace wrap CDI  Neurovascular: Palpable DP Pulse. SILT Sural, Saphenous, Tibial, SPN, and DPN Distibutions  Motor: +PF/DF/EHL/TA  Compartments: Soft and Compressible  ROM: ROM limited 2/2 Pain    AP: 62 y.o. female s/p L unicompartmental knee arthroplasty    -- PT/OT  -- WB: WBAT BLLE OOB  -- DVT: ASA  -- Pain Control  -- Monitor neurovascular status  -- Dispo: Home this morning    Maricruz Edouard MD

## 2018-08-30 NOTE — PLAN OF CARE
Problem: Patient Care Overview  Goal: Plan of Care Review  Outcome: Ongoing (interventions implemented as appropriate)   08/30/18 1124   Coping/Psychosocial   Plan Of Care Reviewed With patient   Plan of Care Review   Progress improving   Outcome Summary Pt cleared by PT for discharge today.        Problem: Knee Arthroplasty (Total, Partial) (Adult)  Goal: Signs and Symptoms of Listed Potential Problems Will be Absent, Minimized or Managed (Knee Arthroplasty)  Outcome: Ongoing (interventions implemented as appropriate)

## 2018-08-30 NOTE — PROGRESS NOTES
Patient: Betina Malhotra  Location: [facility] 3B 3028  MRN: [id number]  Today's date: 8/30/2018    Pt returned to room in recliner chair by therapy aide. NAD noted.          Therapy Pain/Vitals - 08/30/18 1000        Pain/Comfort/Sleep    Presence of Pain complains of pain/discomfort    Preferred Pain Scale number (Numeric Rating Pain Scale)    Pain Body Location - Side Left    Pain Body Location - Orientation lower    Pain Body Location knee    Pain Rating (0-10): Rest 5    Pain Rating (0-10): Activity 5    Pain Management Interventions position adjusted          Prior Living Environment            Prior Level of Function              PT Treatment Summary - 08/30/18 1000        Session Details    Document Type daily treatment    Mode of Treatment group therapy;physical therapy    Patient/Family Observations Patient seated in recliner in gym        Time Calculation    Start Time 1000    Stop Time 1045    Time Calculation (min) 45 min       General Information    Patient Profile Reviewed? 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    Existing Precautions/Restrictions fall;weight bearing       Weight Bearing Status    Left LE Weight Bearing Status weight bearing as tolerated       Orientation Log    Comment AAOx3       Cognition/Psychosocial    Safety Awareness intact       Safety Awareness/Health Promotion    Fall Prevention demonstrates fall risk prevention techniques;demonstrates safety awareness related to fall risk       Range of Motion (ROM)    General Range of Motion lower extremity range of motion deficits identified       General LE Assessment    Lower Extremity: Range of Motion knee, left: LE ROM deficit;RLE ROM was WFL       ROM: Left Knee    AROM: Extension/Flexion within functional limits    AROM Deficit: Extension/Flexion 11°-82°    PROM: Extension/Flexion within functional limits    PROM  Deficit: Extension/Flexion 9°-94°    Pathology: Extension/Flexion soft pathologic end feel    Comment: Extension/Flexion limited by swelling?       Bed Mobility    Comment (Bed Mobility) oob in chair       Transfers    Maintains Weight Bearing Status (Transfers) able to maintain weight bearing status    Comment completes all STS trxfers in a safe and timely manner        Sit to Stand Transfer    [place], Sit to Stand Transfer distant supervision;1 person assist    Assistive Device walker, front-wheeled       Stand to Sit Transfer    [place], Stand to Sit Transfer distant supervision;1 person assist    Assistive Device walker, front-wheeled       Car Transfer    [place], Car Transfer supervision;1 person assist    Assistive Device walker, front-wheeled    Comment completes car trxfer in a safe and timely manner        Gait Analysis/Training    Gait/Stairs Locomotion Gait Training (Group);Stairs Training (Group);Curb Management (Group)       Gait Training    [place], Gait distant supervision;1 person assist    Assistive Device walker, front-wheeled    Distance in Feet 150 feet    Gait Pattern Utilized step-through    Gait Deviations Identified left;antalgic;decreased rai;decreased gait speed;decreased heel strike;decreased step length    Maintains Weight Bearing Status able to maintain weight bearing status    Comment patient ambulates well t/o hallways; no LOB noted        Stairs Training    [place], Stairs distant supervision;1 person assist    Assistive Device railing;cane, straight    Handrail Location left side (ascending);right side (descending)    Number of Stairs 4    Ascending Stairs Technique step-to-step    Descending Stairs Technique step-to-step    Comment patient able to ascend/descend stairs safely        Curb Management    [place] distant supervision;1 person assist    Assistive Device walker, front-wheeled    Specific Task Elements Addressed safety and balance        LE Seated Therapeutic Exercise    Exercise(s) Performed hip abduction;knee flexion;knee extension;ankle dorsiflexion;ankle plantarflexion;LAQ (long arc quad), knee extension;other (see comments)   quad and glute sets     Exercise Type AROM (active range of motion);isometric contraction, static    Expected Outcomes strengthen, facilitate independent active range of motion    Sets/Reps Detail 1x10    Ability to Transfer Skills able to transfer skills from training to functional activity       PT Clinical Impression    Plan For Care Reviewed: Physical Therapy PT plan for care discussed with patient;patient feedback incorporated in PT plan for care;patient voices agreement with PT plan for care    System Pathology/Pathophysiology Noted musculoskeletal    Disability: Inability to Perform Actions/Activities of Required Roles community/leisure    Rehab Potential/Prognosis good, to achieve stated therapy goals    PT Frequency of Treatment 5-7 times per week    Problem List pain;decreased strength    Anticipated Equipment Needs at Discharge front wheeled walker    Expected Discharge Disposition home with assist;home with outpatient services    Daily Outcome Statement Patient continues to ambulate well on levels and stairs DS level of assist required in order to perform all functional activities correctly. Patient cleared for d/c home with assist from family as needed.        Pt educated in T.E, gait training, trxfers, stairs, curb step, continued rehab, safety awareness, energy conservation and breathing techniques.                Education provided this session. See the Patient Education summary report for full details.        Tammi Kelley, PTA

## 2018-08-30 NOTE — NURSING NOTE
BP 94/56 HR 45, patient asymptomatic. Dr. Jah Robles notified. Orders received to administer a 500mL NSS IV bolus. Orders carried out. See MAR for documentation.

## 2018-08-30 NOTE — DISCHARGE INSTRUCTIONS
Please monitor blood pressure at home. Please hold Cozaar if systolic blood pressure is less than 110. Please follow up with your primary care to evaluate blood pressure and make adjustments to medications if needed.       DVT Prophylaxis:   -Continue with Aspirin 81 mg by mouth twice daily X 4 weeks for blood clot prevention.    Incision Care:  -Please remove your surgical dressing on 9/3/18. At that time if the wound is dry and not draining, you can leave it uncovered, open to air. If there is drainage, please place 4x4s covered by paper tape over your incision   -Please do not submerge your incision in water.  Therefore, no baths, pools/hottubs, etc.   -Please keep incision clean and dry. Once your dressing has been removed, do not scrub the incision in the shower and pat dry with a clean towel not used to dry your body.   -Please make sure your incision(s) are checked for signs and symptoms of infection: If any of the below should occur, please call the office:   -drainage from incision site, opening of incision, increased redness and/or tenderness, fevers greater than 101, flu-like symptoms.   -Please call office or go to ED with any thigh/calf pain or swelling in legs, chest pain, chest congestion, problems with breathing.     Constipation/Pain Medication:   -Take your pain medication with food. Do not drive while taking pain medication.   -Pain medications may cause constipation.   -If you have not had a bowel movement in 3 days please call the office   - Please take Senna-Colace as prescribed. Hold for loose stool. If you are having difficulties having a bowel   movement or haven't had bowel movement in 2 days, please add over the counter miralax and take as directed on package.   -Drink plenty of fluids and eat fresh fruits and vegetables.   -DO NOT SMOKE! Smoking is not healthy for your healing process.

## 2018-08-30 NOTE — PLAN OF CARE
Problem: Patient Care Overview  Goal: Plan of Care Review  Outcome: Ongoing (interventions implemented as appropriate)   08/30/18 0400   Coping/Psychosocial   Plan Of Care Reviewed With patient   Plan of Care Review   Progress improving   Outcome Summary OOB assist x1 with walker; voiding.        Problem: Knee Arthroplasty (Total, Partial) (Adult)  Goal: Signs and Symptoms of Listed Potential Problems Will be Absent, Minimized or Managed (Knee Arthroplasty)  Outcome: Ongoing (interventions implemented as appropriate)    Goal: Anesthesia/Sedation Recovery  Outcome: Outcome(s) Achieved Date Met: 08/30/18

## 2018-08-30 NOTE — NURSING NOTE
Discharged to home. Accompanied by her . Discharge instructions and prescriptions given. Verbalized understanding of discharge instructions.

## 2018-08-30 NOTE — NURSING NOTE
Patient c/o pain 8/10, unrelieved by 5mg oxycodone last given at 2240. /57 HR 44. Dr. Jah Robles notified. Per Dr. Robles, nuno to administer another 5mg oxycodone now as ordered. See MAR for documentation.

## 2021-03-01 ENCOUNTER — OFFICE VISIT (OUTPATIENT)
Dept: PRIMARY CARE | Facility: CLINIC | Age: 66
End: 2021-03-01
Payer: COMMERCIAL

## 2021-03-01 VITALS
TEMPERATURE: 97.3 F | WEIGHT: 133 LBS | SYSTOLIC BLOOD PRESSURE: 126 MMHG | HEIGHT: 63 IN | OXYGEN SATURATION: 95 % | RESPIRATION RATE: 16 BRPM | DIASTOLIC BLOOD PRESSURE: 84 MMHG | HEART RATE: 60 BPM | BODY MASS INDEX: 23.57 KG/M2

## 2021-03-01 DIAGNOSIS — Z12.31 ENCOUNTER FOR SCREENING MAMMOGRAM FOR MALIGNANT NEOPLASM OF BREAST: Primary | ICD-10-CM

## 2021-03-01 DIAGNOSIS — Z12.11 COLON CANCER SCREENING: ICD-10-CM

## 2021-03-01 DIAGNOSIS — J45.20 MILD INTERMITTENT ASTHMA WITHOUT COMPLICATION: ICD-10-CM

## 2021-03-01 DIAGNOSIS — Z00.00 ROUTINE ADULT HEALTH MAINTENANCE: ICD-10-CM

## 2021-03-01 PROCEDURE — 99204 OFFICE O/P NEW MOD 45 MIN: CPT | Performed by: NURSE PRACTITIONER

## 2021-03-01 RX ORDER — MONTELUKAST SODIUM 10 MG/1
10 TABLET ORAL DAILY
Qty: 90 TABLET | Refills: 1 | Status: SHIPPED | OUTPATIENT
Start: 2021-03-01 | End: 2022-03-08

## 2021-03-01 RX ORDER — BUDESONIDE AND FORMOTEROL FUMARATE DIHYDRATE 160; 4.5 UG/1; UG/1
2 AEROSOL RESPIRATORY (INHALATION) 2 TIMES DAILY
COMMUNITY
End: 2022-09-12 | Stop reason: SDUPTHER

## 2021-03-01 ASSESSMENT — ENCOUNTER SYMPTOMS
DECREASED CONCENTRATION: 0
RHINORRHEA: 0
FATIGUE: 0
FREQUENCY: 0
EYE DISCHARGE: 0
FEVER: 0
WEAKNESS: 0
SINUS PAIN: 0
TROUBLE SWALLOWING: 0
SLEEP DISTURBANCE: 0
NERVOUS/ANXIOUS: 0
NAUSEA: 0
ARTHRALGIAS: 0
MYALGIAS: 0
ACTIVITY CHANGE: 0
COUGH: 0
EYE ITCHING: 0
SORE THROAT: 0
SHORTNESS OF BREATH: 0
APPETITE CHANGE: 0
ABDOMINAL PAIN: 0
BACK PAIN: 0
SINUS PRESSURE: 0
BRUISES/BLEEDS EASILY: 0
DIFFICULTY URINATING: 0
DIARRHEA: 0
NECK STIFFNESS: 0
NUMBNESS: 0
CONSTIPATION: 0
HEADACHES: 0
ABDOMINAL DISTENTION: 0
NECK PAIN: 0

## 2021-03-01 ASSESSMENT — PATIENT HEALTH QUESTIONNAIRE - PHQ9: SUM OF ALL RESPONSES TO PHQ9 QUESTIONS 1 & 2: 0

## 2021-03-01 NOTE — PROGRESS NOTES
"      Subjective      Patient ID: Betina Malhotra is a 65 y.o. female.  1955      HPI  Presents to [facility] care. Recently moved to the area.  Active at the gym 3-4 times a week cardio and weights. Increased albuterol use recently history of asthma.    Stomach issues increased gas, cramping a few months ago, prior PCP have script for Abd ultrasound, but she reports symptoms have been much better with probiotic.       The following have been reviewed and updated as appropriate in this visit:  Tobacco  Allergies  Meds  Problems  Med Hx  Fam Hx       Review of Systems   Constitutional: Negative for activity change, appetite change, fatigue and fever.   HENT: Negative for congestion, ear pain, rhinorrhea, sinus pressure, sinus pain, sore throat and trouble swallowing.    Eyes: Negative for discharge, itching and visual disturbance.   Respiratory: Negative for cough and shortness of breath.    Cardiovascular: Negative for chest pain and leg swelling.   Gastrointestinal: Negative for abdominal distention, abdominal pain, constipation, diarrhea and nausea.   Endocrine: Negative for cold intolerance and heat intolerance.   Genitourinary: Negative for difficulty urinating, frequency and urgency.   Musculoskeletal: Negative for arthralgias, back pain, myalgias, neck pain and neck stiffness.   Skin: Negative for rash.   Allergic/Immunologic: Negative for environmental allergies.   Neurological: Negative for weakness, numbness and headaches.   Hematological: Does not bruise/bleed easily.   Psychiatric/Behavioral: Negative for decreased concentration and sleep disturbance. The patient is not nervous/anxious.        Objective     Vitals:    03/01/21 1310   BP: 126/84   BP Location: Right upper arm   Patient Position: Sitting   Pulse: 60   Resp: 16   Temp: 36.3 °C (97.3 °F)   TempSrc: Temporal   SpO2: 95%   Weight: 60.3 kg (133 lb)   Height: 1.6 m (5' 3\")     Body mass index is 23.56 kg/m².    Physical Exam  Vitals signs " reviewed.   Constitutional:       Appearance: She is well-developed.   HENT:      Head: Normocephalic and atraumatic.      Right Ear: Tympanic membrane, ear canal and external ear normal.      Left Ear: Tympanic membrane, ear canal and external ear normal.      Nose: Nose normal.   Eyes:      Conjunctiva/sclera: Conjunctivae normal.      Pupils: Pupils are equal, round, and reactive to light.   Neck:      Musculoskeletal: Normal range of motion and neck supple.   Cardiovascular:      Rate and Rhythm: Normal rate and regular rhythm.      Heart sounds: Normal heart sounds.   Pulmonary:      Effort: Pulmonary effort is normal.      Breath sounds: Normal breath sounds.   Abdominal:      General: Bowel sounds are normal.      Palpations: Abdomen is soft.   Musculoskeletal: Normal range of motion.   Skin:     General: Skin is warm and dry.   Neurological:      Mental Status: She is alert and oriented to person, place, and time.   Psychiatric:         Behavior: Behavior normal.         Thought Content: Thought content normal.         Judgment: Judgment normal.         Assessment/Plan   Diagnoses and all orders for this visit:    Encounter for screening mammogram for malignant neoplasm of breast (Primary)  -     BI SCREENING MAMMOGRAM BILATERAL(TOMOSYNTHESIS); Future    Routine adult health maintenance  -     DEXA BONE DENSITY; Future    Colon cancer screening  -     Direct Access Colonoscopy [facility] [place]    Other orders  -     montelukast (SINGULAIR) 10 mg tablet; Take 1 tablet (10 mg total) by mouth daily.    Uspstf guidelines discussed with patient, will get colonoscopy, Mammogram script provided due July. Will get Dexa. Will continue with Symbicort daily and Albuterol prn. Will restart montelukast. Reviewed Annual Wellness when she transitions to Medicare this spring. Healthy lifestyle, diet, exercise , safety discussed and encouraged. Follow up yearly and prn.    TERESA Arevalo

## 2021-03-01 NOTE — PATIENT INSTRUCTIONS
Schedule for Pneumonia Vaccine after 2nd Covid vaccine is completed  Consider Shingrix vaccine.

## 2021-06-28 ENCOUNTER — TELEPHONE (OUTPATIENT)
Dept: PRIMARY CARE | Facility: CLINIC | Age: 66
End: 2021-06-28

## 2021-06-28 DIAGNOSIS — Z78.0 POST-MENOPAUSAL: Primary | ICD-10-CM

## 2021-06-28 NOTE — TELEPHONE ENCOUNTER
Tried calling pt to see what her question was. She has not gotten the study done yet she is scheduled for 7/14/21.

## 2021-07-14 ENCOUNTER — HOSPITAL ENCOUNTER (OUTPATIENT)
Dept: RADIOLOGY | Age: 66
Discharge: HOME | End: 2021-07-14
Attending: NURSE PRACTITIONER
Payer: MEDICARE

## 2021-07-14 DIAGNOSIS — Z12.31 ENCOUNTER FOR SCREENING MAMMOGRAM FOR MALIGNANT NEOPLASM OF BREAST: ICD-10-CM

## 2021-07-14 PROCEDURE — 77067 SCR MAMMO BI INCL CAD: CPT

## 2021-07-26 ENCOUNTER — TELEMEDICINE (OUTPATIENT)
Dept: SURGERY | Facility: CLINIC | Age: 66
End: 2021-07-26
Payer: MEDICARE

## 2021-07-26 VITALS — WEIGHT: 130 LBS | BODY MASS INDEX: 23.04 KG/M2 | HEIGHT: 63 IN

## 2021-07-26 DIAGNOSIS — Z86.0100 HISTORY OF COLON POLYPS: Primary | ICD-10-CM

## 2021-07-26 PROCEDURE — 99212 OFFICE O/P EST SF 10 MIN: CPT | Mod: 95 | Performed by: SURGERY

## 2021-07-26 NOTE — PROGRESS NOTES
BalanceVerification of Patient Location:  The patient affirms they are currently located in the following state: [place]    Request for Consent:    Audio and Video Encounter   Hello, my name is Primo Wells MD.  Before we proceed, can you please verify your identification by telling me your full name and date of birth?  Can you tell me who is in the room with you?    You and I are about to have a telemedicine check-in or visit because you have requested it.  This is a live video-conference.  I am a real person, speaking to you in real time.  There is no one else with me on the video-conference.  However, when we use ([employer], [employer], etc) it is important for you to know that the video-conference may not be secure or private.  I am not recording this conversation and I am asking you not to record it.  This telemedicine visit will be billed to your health insurance or you, if you are self-insured.  You understand you will be responsible for any copayments or coinsurances that apply to your telemedicine visit.  Communication platform used for this encounter:  [employer] Video Visit (with Zoom integration)     Before starting our telemedicine visit, I am required to get your consent for this virtual check-in or visit by telemedicine. Do you consent?      Patient Response to Request for Consent:  Yes      Visit Documentation:  Subjective     Patient ID: Betina Malhotra is a 65 y.o. female.  1955      HPI: Betina is a 65-year-old female with a past medical history as noted below having a telemedicine visit today to schedule a prescription colonoscopy.  She did have polyps removed the time of her first colonoscopy done when she was around age 55.  She did not have any polyps noted on subsequent colonoscopy.  She was supposed to have one done last year but did put this off because of Covid.  She denies any GI complaints has not noted any blood in her stool or melena.  She has not had any weight loss.  There is no  family history of colorectal cancer or disease.  She tells me that she did have a negative Cologuard test done last year and I did explain that this test is not indicated and knows people that have a prior history of polyps.    The following have been reviewed and updated as appropriate in this visit:  Tobacco  Allergies  Meds  Med Hx  Surg Hx  Fam Hx  Soc Hx      Review of Systems: A complete review of systems is negative except as noted above      Assessment/Plan   History of colon polyps.  Cheyanne will be scheduled for a colonoscopy and I did review with her today in the office the indications, risk, complications, and alternatives to this elective procedure.  She tells me that the last gastroenterologist had difficulty completing the procedure and did use a pediatric scope.  She does understand that if I were unable to reach the cecum for any reason that we may do a CT Colography as well  Time Spent:  I spent 6 minutes on this date of service performing the following activities: obtaining history, entering orders, documenting, preparing for visit and coordinating care.

## 2021-07-28 ENCOUNTER — TELEPHONE (OUTPATIENT)
Dept: SURGERY | Facility: CLINIC | Age: 66
End: 2021-07-28

## 2021-08-09 ENCOUNTER — TELEPHONE (OUTPATIENT)
Dept: SURGERY | Facility: CLINIC | Age: 66
End: 2021-08-09

## 2021-08-09 NOTE — TELEPHONE ENCOUNTER
Patient is calling regarding her upcoming colonoscopy-she hasn't received any of her paper work.Please advise thank you

## 2021-08-11 NOTE — TELEPHONE ENCOUNTER
Called and spoke with pt. Verified her email address so I can re-send the instructions for her colonoscopy.

## 2021-08-12 ENCOUNTER — TELEPHONE (OUTPATIENT)
Dept: SURGERY | Facility: CLINIC | Age: 66
End: 2021-08-12

## 2021-08-12 NOTE — TELEPHONE ENCOUNTER
Please call patient- she has not received her information for her upcoming colonoscopy-she thinks she has a problem with her e-mail      Thank you :)

## 2021-08-12 NOTE — TELEPHONE ENCOUNTER
Called and spoke to pt.to see if she has received the instructions that I sent and she said that she did receive them after she looked again.

## 2021-09-14 PROCEDURE — 45378 DIAGNOSTIC COLONOSCOPY: CPT | Performed by: SURGERY

## 2021-11-19 ENCOUNTER — TELEPHONE (OUTPATIENT)
Dept: PRIMARY CARE | Facility: CLINIC | Age: 66
End: 2021-11-19

## 2021-11-19 NOTE — TELEPHONE ENCOUNTER
Nan, yes it is    Medicine Refill Request    Last Office Visit: 3/1/2021  Last Telemedicine Visit: Visit date not found    Next Office Visit: Visit date not found  Next Telemedicine Visit: Visit date not found         Current Outpatient Medications:   •  albuterol HFA (VENTOLIN HFA) 90 mcg/actuation inhaler, Inhale 2 puffs every 6 (six) hours as needed for wheezing., Disp: , Rfl:   •  budesonide-formoteroL (SYMBICORT) 160-4.5 mcg/actuation inhaler, Inhale 2 puffs 2 (two) times a day. Rinse mouth with water after use to reduce aftertaste and incidence of candidiasis.  Do not swallow. For patients not on a ventilator, a spacer is recommended to be used with this medication/inhaler., Disp: , Rfl:   •  calcium carbonate/vitamin D3 (CALTRATE 600 + D ORAL), Take by mouth every evening.  , Disp: , Rfl:   •  losartan-hydrochlorothiazide (HYZAAR) 50-12.5 mg per tablet, Take 1 tablet by mouth daily., Disp: , Rfl:   •  MAGNESIUM ORAL, Take 400 mg by mouth every evening.  , Disp: , Rfl:   •  montelukast (SINGULAIR) 10 mg tablet, Take 1 tablet (10 mg total) by mouth daily., Disp: 90 tablet, Rfl: 1  •  thyroid,pork (ARMOUR THYROID ORAL), Take 45 mg by mouth daily., Disp: , Rfl:       BP Readings from Last 3 Encounters:   03/01/21 126/84   08/30/18 132/71       Recent Lab results:  No results found for: CHOL, No results found for: HDL, No results found for: LDLCALC, No results found for: TRIG     Lab Results   Component Value Date    GLUCOSE 103 (H) 08/30/2018   , No results found for: HGBA1C      Lab Results   Component Value Date    CREATININE 1.0 08/30/2018       No results found for: TSH

## 2021-11-19 NOTE — TELEPHONE ENCOUNTER
From: Betina Malhotra  To: Office of TERESA Arevalo  Sent: 11/19/2021 12:21 PM EST  Subject: Medication Renewal Request    Refills have been requested for the following medications:   Other - HYDROCHLOROTHIAZIDE 12.5 mg 90 Day Supply    Preferred pharmacy: [facility] #[id number] - [place], [place] - [address] AT [facility]  Delivery method: Pickup

## 2021-11-21 RX ORDER — LOSARTAN POTASSIUM AND HYDROCHLOROTHIAZIDE 12.5; 5 MG/1; MG/1
1 TABLET ORAL DAILY
Qty: 90 TABLET | Refills: 1 | Status: SHIPPED | OUTPATIENT
Start: 2021-11-21 | End: 2021-11-22 | Stop reason: SDUPTHER

## 2021-11-22 RX ORDER — LOSARTAN POTASSIUM 100 MG/1
TABLET ORAL
COMMUNITY
Start: 2021-09-17 | End: 2021-12-06 | Stop reason: SDUPTHER

## 2021-11-22 RX ORDER — HYDROCHLOROTHIAZIDE 12.5 MG/1
12.5 TABLET ORAL DAILY
Qty: 90 TABLET | Refills: 1 | Status: SHIPPED | OUTPATIENT
Start: 2021-11-22 | End: 2021-12-03 | Stop reason: SDUPTHER

## 2021-11-22 NOTE — TELEPHONE ENCOUNTER
"\"  I do not take losartan 50 mg with the hydrochlorothiazide 12.5 mg.  I take a 100 mg losartan tablet and a 12.5 mg of hydrochlorothiazide tablet  I only need refill on the 12.5 mg of hydrochlorothiazide tablet, 90 day supply. I still have several losartan tablets.  Thank You  \"        From patient. Please send if appropriate.    "

## 2021-11-30 NOTE — TELEPHONE ENCOUNTER
E-Prescribing Status      Outpatient Medication Detail    hydrochlorothiazide 12.5 mg tablet        Sig: Take 1 tablet (12.5 mg total) by mouth daily.        Sent to pharmacy as: hydroCHLOROthiazide 12.5 mg tablet (HYDRODIURIL)        Class: Normal        Route: oral        E-Prescribing Status: Receipt confirmed by pharmacy (11/22/2021 12:41 PM EST)          This was sent last week, can we confirm that pharmacy received?    no chest pain and no edema.

## 2021-12-03 ENCOUNTER — TELEPHONE (OUTPATIENT)
Dept: PRIMARY CARE | Facility: CLINIC | Age: 66
End: 2021-12-03

## 2021-12-03 RX ORDER — HYDROCHLOROTHIAZIDE 12.5 MG/1
12.5 TABLET ORAL DAILY
Qty: 90 TABLET | Refills: 1 | Status: SHIPPED | OUTPATIENT
Start: 2021-12-03 | End: 2022-12-11 | Stop reason: SDUPTHER

## 2021-12-03 NOTE — TELEPHONE ENCOUNTER
Please re send the hydrochlorothiazide 12.5 mg tablet    [facility] [address]    The patient did not     Medicine Refill Request    Last Office Visit: 3/1/2021  Last Telemedicine Visit: Visit date not found    Next Office Visit: Visit date not found  Next Telemedicine Visit: Visit date not found         Current Outpatient Medications:   •  albuterol HFA (VENTOLIN HFA) 90 mcg/actuation inhaler, Inhale 2 puffs every 6 (six) hours as needed for wheezing., Disp: , Rfl:   •  budesonide-formoteroL (SYMBICORT) 160-4.5 mcg/actuation inhaler, Inhale 2 puffs 2 (two) times a day. Rinse mouth with water after use to reduce aftertaste and incidence of candidiasis.  Do not swallow. For patients not on a ventilator, a spacer is recommended to be used with this medication/inhaler., Disp: , Rfl:   •  calcium carbonate/vitamin D3 (CALTRATE 600 + D ORAL), Take by mouth every evening.  , Disp: , Rfl:   •  hydrochlorothiazide 12.5 mg tablet, Take 1 tablet (12.5 mg total) by mouth daily., Disp: 90 tablet, Rfl: 1  •  losartan 100 mg tablet, , Disp: , Rfl:   •  MAGNESIUM ORAL, Take 400 mg by mouth every evening.  , Disp: , Rfl:   •  montelukast (SINGULAIR) 10 mg tablet, Take 1 tablet (10 mg total) by mouth daily., Disp: 90 tablet, Rfl: 1  •  thyroid,pork (ARMOUR THYROID ORAL), Take 45 mg by mouth daily., Disp: , Rfl:       BP Readings from Last 3 Encounters:   03/01/21 126/84   08/30/18 132/71       Recent Lab results:  No results found for: CHOL, No results found for: HDL, No results found for: LDLCALC, No results found for: TRIG     Lab Results   Component Value Date    GLUCOSE 103 (H) 08/30/2018   , No results found for: HGBA1C      Lab Results   Component Value Date    CREATININE 1.0 08/30/2018       No results found for: TSH

## 2021-12-06 RX ORDER — LOSARTAN POTASSIUM 100 MG/1
100 TABLET ORAL DAILY
Qty: 90 TABLET | Refills: 1 | Status: SHIPPED | OUTPATIENT
Start: 2021-12-06 | End: 2022-12-11 | Stop reason: SDUPTHER

## 2021-12-06 NOTE — TELEPHONE ENCOUNTER
Medicine Refill Request    Last Office Visit: 3/1/2021  Last Telemedicine Visit: Visit date not found    Next Office Visit: Visit date not found  Next Telemedicine Visit: Visit date not found         Current Outpatient Medications:   •  albuterol HFA (VENTOLIN HFA) 90 mcg/actuation inhaler, Inhale 2 puffs every 6 (six) hours as needed for wheezing., Disp: , Rfl:   •  budesonide-formoteroL (SYMBICORT) 160-4.5 mcg/actuation inhaler, Inhale 2 puffs 2 (two) times a day. Rinse mouth with water after use to reduce aftertaste and incidence of candidiasis.  Do not swallow. For patients not on a ventilator, a spacer is recommended to be used with this medication/inhaler., Disp: , Rfl:   •  calcium carbonate/vitamin D3 (CALTRATE 600 + D ORAL), Take by mouth every evening.  , Disp: , Rfl:   •  hydrochlorothiazide 12.5 mg tablet, Take 1 tablet (12.5 mg total) by mouth daily., Disp: 90 tablet, Rfl: 1  •  losartan 100 mg tablet, , Disp: , Rfl:   •  MAGNESIUM ORAL, Take 400 mg by mouth every evening.  , Disp: , Rfl:   •  montelukast (SINGULAIR) 10 mg tablet, Take 1 tablet (10 mg total) by mouth daily., Disp: 90 tablet, Rfl: 1  •  thyroid,pork (ARMOUR THYROID ORAL), Take 45 mg by mouth daily., Disp: , Rfl:       BP Readings from Last 3 Encounters:   03/01/21 126/84   08/30/18 132/71       Recent Lab results:  No results found for: CHOL, No results found for: HDL, No results found for: LDLCALC, No results found for: TRIG     Lab Results   Component Value Date    GLUCOSE 103 (H) 08/30/2018   , No results found for: HGBA1C      Lab Results   Component Value Date    CREATININE 1.0 08/30/2018       No results found for: TSH

## 2022-03-08 RX ORDER — MONTELUKAST SODIUM 10 MG/1
TABLET ORAL
Qty: 90 TABLET | Refills: 0 | Status: SHIPPED | OUTPATIENT
Start: 2022-03-08 | End: 2022-06-05

## 2022-03-08 NOTE — TELEPHONE ENCOUNTER
Portal message sent to schedule OV soon.       Medicine Refill Request    Last Office: 3/1/2021   Last Consult Visit: Visit date not found  Last Telemedicine Visit: Visit date not found    Next Appointment: Visit date not found      Current Outpatient Medications:   •  albuterol HFA (VENTOLIN HFA) 90 mcg/actuation inhaler, Inhale 2 puffs every 6 (six) hours as needed for wheezing., Disp: , Rfl:   •  budesonide-formoteroL (SYMBICORT) 160-4.5 mcg/actuation inhaler, Inhale 2 puffs 2 (two) times a day. Rinse mouth with water after use to reduce aftertaste and incidence of candidiasis.  Do not swallow. For patients not on a ventilator, a spacer is recommended to be used with this medication/inhaler., Disp: , Rfl:   •  calcium carbonate/vitamin D3 (CALTRATE 600 + D ORAL), Take by mouth every evening.  , Disp: , Rfl:   •  hydrochlorothiazide 12.5 mg tablet, Take 1 tablet (12.5 mg total) by mouth daily., Disp: 90 tablet, Rfl: 1  •  losartan 100 mg tablet, Take 1 tablet (100 mg total) by mouth daily., Disp: 90 tablet, Rfl: 1  •  MAGNESIUM ORAL, Take 400 mg by mouth every evening.  , Disp: , Rfl:   •  montelukast (SINGULAIR) 10 mg tablet, Take 1 tablet (10 mg total) by mouth daily., Disp: 90 tablet, Rfl: 1  •  thyroid,pork (ARMOUR THYROID ORAL), Take 45 mg by mouth daily., Disp: , Rfl:       BP Readings from Last 3 Encounters:   03/01/21 126/84   08/30/18 132/71       Recent Lab results:  No results found for: CHOL, No results found for: HDL, No results found for: LDLCALC, No results found for: TRIG     Lab Results   Component Value Date    GLUCOSE 103 (H) 08/30/2018   , No results found for: HGBA1C      Lab Results   Component Value Date    CREATININE 1.0 08/30/2018       No results found for: TSH

## 2022-04-21 ENCOUNTER — OFFICE VISIT (OUTPATIENT)
Dept: PRIMARY CARE | Facility: CLINIC | Age: 67
End: 2022-04-21
Payer: MEDICARE

## 2022-04-21 VITALS
RESPIRATION RATE: 14 BRPM | WEIGHT: 131.2 LBS | HEART RATE: 70 BPM | OXYGEN SATURATION: 98 % | DIASTOLIC BLOOD PRESSURE: 90 MMHG | BODY MASS INDEX: 23.25 KG/M2 | HEIGHT: 63 IN | SYSTOLIC BLOOD PRESSURE: 138 MMHG | TEMPERATURE: 98.1 F

## 2022-04-21 DIAGNOSIS — E03.9 ACQUIRED HYPOTHYROIDISM: ICD-10-CM

## 2022-04-21 DIAGNOSIS — Z12.31 ENCOUNTER FOR SCREENING MAMMOGRAM FOR MALIGNANT NEOPLASM OF BREAST: ICD-10-CM

## 2022-04-21 DIAGNOSIS — Z78.0 POST-MENOPAUSAL: ICD-10-CM

## 2022-04-21 DIAGNOSIS — Z00.00 ROUTINE ADULT HEALTH MAINTENANCE: ICD-10-CM

## 2022-04-21 DIAGNOSIS — J45.20 MILD INTERMITTENT ASTHMA WITHOUT COMPLICATION: ICD-10-CM

## 2022-04-21 DIAGNOSIS — Z00.00 ENCOUNTER FOR MEDICARE ANNUAL WELLNESS EXAM: Primary | ICD-10-CM

## 2022-04-21 DIAGNOSIS — R01.1 MURMUR: ICD-10-CM

## 2022-04-21 DIAGNOSIS — I10 PRIMARY HYPERTENSION: ICD-10-CM

## 2022-04-21 DIAGNOSIS — M19.90 ARTHRITIS: ICD-10-CM

## 2022-04-21 PROCEDURE — G0439 PPPS, SUBSEQ VISIT: HCPCS | Performed by: NURSE PRACTITIONER

## 2022-04-21 RX ORDER — AMOXICILLIN 500 MG/1
2000 TABLET, FILM COATED ORAL 4 TIMES DAILY PRN
Qty: 16 TABLET | Refills: 0 | Status: SHIPPED | OUTPATIENT
Start: 2022-04-21 | End: 2022-04-22

## 2022-04-21 RX ORDER — VALACYCLOVIR HYDROCHLORIDE 1 G/1
1000 TABLET, FILM COATED ORAL 2 TIMES DAILY
Qty: 30 TABLET | Refills: 0 | Status: SHIPPED | OUTPATIENT
Start: 2022-04-21 | End: 2022-05-09

## 2022-04-21 RX ORDER — SULFAMETHOXAZOLE AND TRIMETHOPRIM 800; 160 MG/1; MG/1
TABLET ORAL
COMMUNITY
Start: 2022-03-02

## 2022-04-21 ASSESSMENT — MINI COG: COMPLETED: YES

## 2022-04-21 ASSESSMENT — PATIENT HEALTH QUESTIONNAIRE - PHQ9: SUM OF ALL RESPONSES TO PHQ9 QUESTIONS 1 & 2: 0

## 2022-04-21 NOTE — PATIENT INSTRUCTIONS
Your Personalized Prevention Plan Services (PPPS)    Preventive Services Checklist (Assumes Average Risk Unless Otherwise Noted):    Health Maintenance Topics with due status: Overdue       Topic Date Due    DEXA Scan Never done    Hepatitis C Screening Never done    Zoster Vaccine Never done    Pneumococcal (65 years and older) 11/29/2020    COVID-19 Vaccine 03/06/2021     Health Maintenance Topics with due status: Not Due       Topic Last Completion Date    DTaP, Tdap, and Td Vaccines 08/07/2016    Influenza Vaccine 11/30/2020    Breast Cancer Screening 07/14/2021    Colonoscopy 09/14/2021    Medicare Annual Wellness Visit 04/21/2022     Health Maintenance Topics with due status: Completed       Topic Last Completion Date    Annual Falls Risk Screening 04/21/2022     Health Maintenance Topics with due status: Aged Out       Topic Date Due    Meningococcal ACWY Aged Out    HIB Vaccines Aged Out    IPV Vaccines Aged Out    HPV Vaccines Aged Out       You May Be Eligible for These Additional Preventive Services   (Assumes Average Risk Unless Otherwise Noted)  Diabetes Screening Any 1 risk factor: hypertension, dyslipidemia, obesity, high glucose; or Any 2 risk factors: >=66yo, overweight, family history diabetes (covered every 6 months)   Hepatitis C Screening Any 1 risk factor: 1) blood transfusion before 1992,   2) current or past injection drug use (annually for high risk; if born between 6855-6234, see above for status).   Vaccine: Hepatitis B As necessary if at-risk: hemophilia, ESRD, diabetes, living with individual infected with hep B, healthcare worker with frequent contact with blood/bodily fluids (series covered once)   Sexually Transmitted Diseases (STDs) As necessary chlamydia, gonorrhea, syphilis, hepatitis B (covered annually)  HIV if any 1 risk factor present: 1) <14yo or >66yo and at increased risk or 2) 15-66yo and ask for it (covered annually)   Lung Cancer Screening Low dose  chest CT if all three risk factors: 1) 50-78yo, 2) smoker or quit within last 15y, 3) >=20 pack years (covered annually).  No results found for this or any previous visit.       Cholesterol Screening Both risk factors: 1) >=19yo and 2)  increased risk coronary artery disease (covered every 5 years).     Breast Cancer Screening Covered once 35-40yo, annually >=39yo (if >=51yo, see above for status).         Health Risk Factors with Personalized Education:  ----------------------------------------------------------------------------------------------------------------------  Controlling Your Blood Pressure  · Maintain a normal weight (body mass index between 18.5 and 24.9).  · Eat more fruit, vegetables and low-fat dairy.  · Eat less saturated fat and total fat.  · Lower your sodium (salt) intake.  Try to stay under 1500 mg per day, but if you cannot get your intake to be that low, at least lower it by 1000 mg.  · Stay active.  Try to get at least 90 to 150 minutes of exercise per week.  Try brisk walking, swimming, bicycling or dancing.  · Limit alcohol intake.  When you do consume alcohol, drink no more than 1 drink per day.  · If you have been prescribed medication, take it regularly and exactly as prescribed.  Let your PCP know if you have any problems or questions about your medication.  · Check your blood pressure at home or at the store.  Write down your readings and share them with your PCP  ----------------------------------------------------------------------------------------------------------------------  Maintaining Strong Bones  · Try to get at least 90 to 150 minutes of weight-bearing exercise per week.  · Ensure intake of at least 1200mg of calcium per day.  Eat foods high in calcium like milk and other dairy, green vegetables, fruit, canned fish with soft and edible bones, nuts, calcium-set tofu.  Some foods are calcium-fortified, like bread, cereal, fruit juices and mineral water.  · Help your body  make vitamin D by getting 10-15 minutes per day of sunlight.    · Ensure intake of at least 600IU of vitamin D per day.  Eat foods high in vitamin D like oily fish (salmon, sardines, mackerel) and eggs.  Some foods are fortified with vitamin D, like dairy and cereals.  · Avoid high amounts of caffeine and salt, since they can cause the body to loose calcium.  · Limit alcohol intake, since it is associated with weaker bones and is associated with falls and fractures.  · Limit intake of fizzy drinks.  ----------------------------------------------------------------------------------------------------------------------  Reducing Your Risk of Falls  · Tell your PCP if any of your medications make you feel tired, dizzy, lightheaded or off-balance.  · Maintain coordination, flexibility and balance by ensuring regular physical activity.  · Limit alcohol intake to 1 drink per day.  Consider avoiding all alcohol intake.  · Ensure good vision.  Visit an ophthalmologist or optometrist regularly for vision screening or to make sure your glasses / contact lens prescription is correct.  If you need glasses or contacts, wear them.  When you get new glasses or contacts, take time to get used to them.  Do not wear sunglasses or tinted lenses when indoors.  · Ensure good hearing.  Have your hearing checked if you are having trouble hearing, or family and friends think you cannot hear them.  If you need a hearing aid, be sure it fits well and wear it.  · Get enough rest.  Ensure about 7-9 hours of sleep every day.  · Get up slowly from your bed or chairs.  Do not start walking until you are sure you feel steady.  · Wear non-skid, rubber-soled, low-heeled shoes.  Do not walk in socks, or in shoes and slippers with smooth soles.  · If your PCP or therapist recommends using a cane or walker, use it regularly.  · Make your home safer.  Increase lighting throughout the house, especially at the top and bottom of stairs.  Ensure lighting is  easily turned on when getting up in the middle of the night.  Make sure there are two secure rails on all stairs.  Install grab bars in the bathtub / shower and near the toilet.  Consider using a shower chair and / or a hand-held shower.  · Spread sand or salt on icy surfaces.  Beware of wet surfaces, which can be icy.  · Tell your PCP if you have fallen.

## 2022-04-21 NOTE — PROGRESS NOTES
Subjective     Betina Malhotra is a 66 y.o. female who presents for a subsequent annual wellness visit.     Patient Care Team:  Selina Paz CRNP as PCP - General (Family Medicine)  Ren Barnett DO as Consulting Physician (Family Medicine)    Comprehensive Medical and Social History  Patient Active Problem List   Diagnosis   • S/P left unicompartmental knee replacement   • Primary hypertension   • Acquired hypothyroidism   • Mild intermittent asthma without complication   • Arthritis   • Murmur     Past Medical History:   Diagnosis Date   • Anemia    • Arthritis    • Asthma    • Hypertension    • Hypothyroidism    • Murmur    • PONV (postoperative nausea and vomiting)    • Seasonal allergies      Past Surgical History:   Procedure Laterality Date   • COLONOSCOPY     • KNEE ARTHROSCOPY Left      Allergies   Allergen Reactions   • Dog Dander Shortness of breath     Current Outpatient Medications   Medication Sig Dispense Refill   • albuterol HFA (VENTOLIN HFA) 90 mcg/actuation inhaler Inhale 2 puffs every 6 (six) hours as needed for wheezing.     • amoxicillin (AMOXIL) 500 mg tablet Take 4 tablets (2,000 mg total) by mouth 4 (four) times a day as needed (Dental procedures) for up to 1 day. 16 tablet 0   • ARMOUR THYROID 60 mg tablet TAKE 3/4 (0.75) TABLET ONCE A DAY     • budesonide-formoteroL (SYMBICORT) 160-4.5 mcg/actuation inhaler Inhale 2 puffs 2 (two) times a day. Rinse mouth with water after use to reduce aftertaste and incidence of candidiasis.   Do not swallow.  For patients not on a ventilator, a spacer is recommended to be used with this medication/inhaler.     • calcium carbonate/vitamin D3 (CALTRATE 600 + D ORAL) Take by mouth every evening.       • hydrochlorothiazide 12.5 mg tablet Take 1 tablet (12.5 mg total) by mouth daily. 90 tablet 1   • losartan 100 mg tablet Take 1 tablet (100 mg total) by mouth daily. 90 tablet 1   • MAGNESIUM ORAL Take 400 mg by mouth every evening.       • montelukast  "(SINGULAIR) 10 mg tablet TAKE 1 TABLET BY MOUTH EVERY DAY 90 tablet 0   • valACYclovir (VALTREX) 1 gram tablet Take 1 tablet (1,000 mg total) by mouth 2 (two) times a day for 7 days. 30 tablet 0     No current facility-administered medications for this visit.     Social History     Tobacco Use   • Smoking status: Never Smoker   • Smokeless tobacco: Never Used   Vaping Use   • Vaping Use: Never used   Substance Use Topics   • Alcohol use: Yes     Comment: occasionally   • Drug use: No     Family History   Problem Relation Age of Onset   • Pancreatic cancer Biological Mother    • Pancreatic cancer Biological Father        Objective   Vitals  Vitals:    04/21/22 1445   BP: 138/90   Pulse: 70   Resp: 14   Temp: 36.7 °C (98.1 °F)   SpO2: 98%   Weight: 59.5 kg (131 lb 3.2 oz)   Height: 1.6 m (5' 3\")     Body mass index is 23.24 kg/m².    Advanced Care Plan  Does patient have advance directive?: Yes       Patient has Advance Directive: Advance Directive is NOT in chart, requested to bring in                             PHQ  Will the patient answer the depression questions?: Yes   Little interest or pleasure in doing things: Not at all   Feeling down, depressed, or hopeless: Not at all   Depression Risk: 0                                             Mini Cog  Completed: Yes  Result: Negative      Get Up and Go  Result: Pass    STEADI Falls Risk  One or more falls in the last year: No           Has trouble stepping up onto a curb: No   Advised to use a cane or walker to get around safely: No   Often has to rush to the toilet: No   Feels unsteady when walking: No   Has lost some feeling in feet: No   Often feels sad or depressed: No   Steadies self on furniture while walking at home: No   Takes medication that makes him/her feel lightheaded or more tired than usual: No   Worried about falling: No   Takes medicine to sleep or improve mood: Yes   Needs to push with hands when rising from a chair: No   Falls screen completed: " Yes     Hearing and Vision Screening  No exam data present  See HRA for relevant hearing screening response.    Assessment/Plan   Diagnoses and all orders for this visit:    Encounter for Medicare annual wellness exam (Primary)    Encounter for screening mammogram for malignant neoplasm of breast  -     BI SCREENING MAMMOGRAM BILATERAL(TOMOSYNTHESIS); Future    Acquired hypothyroidism  -     T4, free; Future  -     TSH; Future    Arthritis    Primary hypertension  -     CBC and differential; Future  -     Comprehensive metabolic panel; Future  -     Lipid panel; Future  -     Ambulatory referral to Cardiology; Future    Mild intermittent asthma without complication    Murmur  -     Ambulatory referral to Cardiology; Future    Routine adult health maintenance    Post-menopausal  -     Ambulatory referral to Gynecology; Future    Other orders  -     amoxicillin (AMOXIL) 500 mg tablet; Take 4 tablets (2,000 mg total) by mouth 4 (four) times a day as needed (Dental procedures) for up to 1 day.  -     valACYclovir (VALTREX) 1 gram tablet; Take 1 tablet (1,000 mg total) by mouth 2 (two) times a day for 7 days.    Uspstf guidelines discussed with patient, script for Mammogram provided, referral to Gyn for annual. Due for Pneumovax 23 but is leaving for a trip tomorrow, will hold. Refills provided. Will check labs. Will establish with Cardiology. All questions and concerns addressed.     TERESA Arevalo      See Patient Instructions (the written plan) which was given to the patient for PPPS and health risk factors with interventions.

## 2022-05-09 RX ORDER — VALACYCLOVIR HYDROCHLORIDE 1 G/1
TABLET, FILM COATED ORAL
Qty: 30 TABLET | Refills: 0 | Status: SHIPPED | OUTPATIENT
Start: 2022-05-09 | End: 2022-05-23

## 2022-05-19 LAB
ALBUMIN SERPL-MCNC: 4.7 G/DL (ref 3.8–4.8)
ALBUMIN/GLOB SERPL: 3.1 {RATIO} (ref 1.2–2.2)
ALP SERPL-CCNC: 35 IU/L (ref 44–121)
ALT SERPL-CCNC: 17 IU/L (ref 0–32)
AST SERPL-CCNC: 31 IU/L (ref 0–40)
BASOPHILS # BLD AUTO: 0 X10E3/UL (ref 0–0.2)
BASOPHILS NFR BLD AUTO: 1 %
BILIRUB SERPL-MCNC: 1 MG/DL (ref 0–1.2)
BUN SERPL-MCNC: 26 MG/DL (ref 8–27)
BUN/CREAT SERPL: 24 (ref 12–28)
CALCIUM SERPL-MCNC: 9.2 MG/DL (ref 8.7–10.3)
CHLORIDE SERPL-SCNC: 100 MMOL/L (ref 96–106)
CHOLEST SERPL-MCNC: 215 MG/DL (ref 100–199)
CO2 SERPL-SCNC: 22 MMOL/L (ref 20–29)
CREAT SERPL-MCNC: 1.07 MG/DL (ref 0.57–1)
EGFRCR SERPLBLD CKD-EPI 2021: 57 ML/MIN/1.73
EOSINOPHIL # BLD AUTO: 0.1 X10E3/UL (ref 0–0.4)
EOSINOPHIL NFR BLD AUTO: 3 %
ERYTHROCYTE [DISTWIDTH] IN BLOOD BY AUTOMATED COUNT: 12.9 % (ref 11.7–15.4)
GLOBULIN SER CALC-MCNC: 1.5 G/DL (ref 1.5–4.5)
GLUCOSE SERPL-MCNC: 104 MG/DL (ref 65–99)
HCT VFR BLD AUTO: 35.5 % (ref 34–46.6)
HDLC SERPL-MCNC: 95 MG/DL
HGB BLD-MCNC: 12 G/DL (ref 11.1–15.9)
IMM GRANULOCYTES # BLD AUTO: 0 X10E3/UL (ref 0–0.1)
IMM GRANULOCYTES NFR BLD AUTO: 0 %
LDLC SERPL CALC-MCNC: 110 MG/DL (ref 0–99)
LYMPHOCYTES # BLD AUTO: 1.4 X10E3/UL (ref 0.7–3.1)
LYMPHOCYTES NFR BLD AUTO: 38 %
MCH RBC QN AUTO: 30.7 PG (ref 26.6–33)
MCHC RBC AUTO-ENTMCNC: 33.8 G/DL (ref 31.5–35.7)
MCV RBC AUTO: 91 FL (ref 79–97)
MONOCYTES # BLD AUTO: 0.3 X10E3/UL (ref 0.1–0.9)
MONOCYTES NFR BLD AUTO: 9 %
NEUTROPHILS # BLD AUTO: 1.7 X10E3/UL (ref 1.4–7)
NEUTROPHILS NFR BLD AUTO: 49 %
PLATELET # BLD AUTO: 243 X10E3/UL (ref 150–450)
POTASSIUM SERPL-SCNC: 4.1 MMOL/L (ref 3.5–5.2)
PROT SERPL-MCNC: 6.2 G/DL (ref 6–8.5)
RBC # BLD AUTO: 3.91 X10E6/UL (ref 3.77–5.28)
SODIUM SERPL-SCNC: 137 MMOL/L (ref 134–144)
SPECIMEN STATUS: NORMAL
T4 FREE SERPL-MCNC: 1.14 NG/DL (ref 0.82–1.77)
TRIGL SERPL-MCNC: 54 MG/DL (ref 0–149)
TSH SERPL DL<=0.005 MIU/L-ACNC: 0.7 UIU/ML (ref 0.45–4.5)
VLDLC SERPL CALC-MCNC: 10 MG/DL (ref 5–40)
WBC # BLD AUTO: 3.6 X10E3/UL (ref 3.4–10.8)

## 2022-05-23 RX ORDER — VALACYCLOVIR HYDROCHLORIDE 1 G/1
TABLET, FILM COATED ORAL
Qty: 30 TABLET | Refills: 0 | Status: SHIPPED | OUTPATIENT
Start: 2022-05-23 | End: 2022-12-05

## 2022-06-05 RX ORDER — MONTELUKAST SODIUM 10 MG/1
TABLET ORAL
Qty: 90 TABLET | Refills: 0 | Status: SHIPPED | OUTPATIENT
Start: 2022-06-05 | End: 2024-10-30

## 2022-07-25 ENCOUNTER — HOSPITAL ENCOUNTER (OUTPATIENT)
Dept: RADIOLOGY | Age: 67
Discharge: HOME | End: 2022-07-25
Attending: NURSE PRACTITIONER
Payer: MEDICARE

## 2022-07-25 DIAGNOSIS — Z12.31 ENCOUNTER FOR SCREENING MAMMOGRAM FOR MALIGNANT NEOPLASM OF BREAST: ICD-10-CM

## 2022-07-25 PROCEDURE — 77063 BREAST TOMOSYNTHESIS BI: CPT

## 2022-09-12 RX ORDER — BUDESONIDE AND FORMOTEROL FUMARATE DIHYDRATE 160; 4.5 UG/1; UG/1
2 AEROSOL RESPIRATORY (INHALATION) 2 TIMES DAILY
Qty: 10.2 G | Refills: 3 | Status: SHIPPED | OUTPATIENT
Start: 2022-09-12 | End: 2022-09-14 | Stop reason: SDUPTHER

## 2022-09-12 NOTE — TELEPHONE ENCOUNTER
Medicine Refill Request    Last Office: 4/21/2022   Last Consult Visit: Visit date not found  Last Telemedicine Visit: Visit date not found    Next Appointment: Visit date not found      Current Outpatient Medications:     albuterol HFA (VENTOLIN HFA) 90 mcg/actuation inhaler, Inhale 2 puffs every 6 (six) hours as needed for wheezing., Disp: , Rfl:     ARMOUR THYROID 60 mg tablet, TAKE 3/4 (0.75) TABLET ONCE A DAY, Disp: , Rfl:     budesonide-formoteroL (SYMBICORT) 160-4.5 mcg/actuation inhaler, Inhale 2 puffs 2 (two) times a day. Rinse mouth with water after use to reduce aftertaste and incidence of candidiasis.  Do not swallow. For patients not on a ventilator, a spacer is recommended to be used with this medication/inhaler., Disp: , Rfl:     calcium carbonate/vitamin D3 (CALTRATE 600 + D ORAL), Take by mouth every evening.  , Disp: , Rfl:     hydrochlorothiazide 12.5 mg tablet, Take 1 tablet (12.5 mg total) by mouth daily., Disp: 90 tablet, Rfl: 1    losartan 100 mg tablet, Take 1 tablet (100 mg total) by mouth daily., Disp: 90 tablet, Rfl: 1    MAGNESIUM ORAL, Take 400 mg by mouth every evening.  , Disp: , Rfl:     montelukast (SINGULAIR) 10 mg tablet, TAKE 1 TABLET BY MOUTH EVERY DAY, Disp: 90 tablet, Rfl: 0    valACYclovir (VALTREX) 1 gram tablet, TAKE 1 TABLET BY MOUTH TWICE A DAY FOR 7 DAYS, Disp: 30 tablet, Rfl: 0      BP Readings from Last 3 Encounters:   04/21/22 138/90   03/01/21 126/84   08/30/18 132/71       Recent Lab results:  Lab Results   Component Value Date    CHOL 215 (H) 05/19/2022   ,   Lab Results   Component Value Date    HDL 95 05/19/2022   ,   Lab Results   Component Value Date    LDLCALC 110 (H) 05/19/2022   ,   Lab Results   Component Value Date    TRIG 54 05/19/2022        Lab Results   Component Value Date    GLUCOSE 104 (H) 05/19/2022   , No results found for: HGBA1C      Lab Results   Component Value Date    CREATININE 1.07 (H) 05/19/2022       Lab Results   Component Value  Date    TSH 0.700 05/19/2022

## 2022-09-14 DIAGNOSIS — J45.20 MILD INTERMITTENT ASTHMA WITHOUT COMPLICATION: Primary | ICD-10-CM

## 2022-09-14 RX ORDER — BUDESONIDE AND FORMOTEROL FUMARATE DIHYDRATE 160; 4.5 UG/1; UG/1
2 AEROSOL RESPIRATORY (INHALATION) 2 TIMES DAILY
Qty: 10.2 G | Refills: 3 | Status: SHIPPED | OUTPATIENT
Start: 2022-09-14 | End: 2022-09-19 | Stop reason: SDUPTHER

## 2022-09-19 ENCOUNTER — TELEPHONE (OUTPATIENT)
Dept: PRIMARY CARE | Facility: CLINIC | Age: 67
End: 2022-09-19
Payer: MEDICARE

## 2022-09-19 DIAGNOSIS — J45.20 MILD INTERMITTENT ASTHMA WITHOUT COMPLICATION: ICD-10-CM

## 2022-09-19 RX ORDER — BUDESONIDE AND FORMOTEROL FUMARATE DIHYDRATE 160; 4.5 UG/1; UG/1
2 AEROSOL RESPIRATORY (INHALATION) 2 TIMES DAILY
Qty: 10.2 G | Refills: 3 | Status: SHIPPED | OUTPATIENT
Start: 2022-09-19 | End: 2023-07-10 | Stop reason: SDUPTHER

## 2022-09-19 RX ORDER — BUDESONIDE AND FORMOTEROL FUMARATE DIHYDRATE 160; 4.5 UG/1; UG/1
2 AEROSOL RESPIRATORY (INHALATION) 2 TIMES DAILY
Qty: 10.2 G | Refills: 3 | Status: SHIPPED | OUTPATIENT
Start: 2022-09-19 | End: 2022-09-19 | Stop reason: SDUPTHER

## 2022-09-19 NOTE — TELEPHONE ENCOUNTER
----- Message from Ren Barnett DO sent at 9/19/2022 11:01 AM EDT -----  Regarding: FW: Symbicort Prescription    ----- Message -----  From: Betina Malhotra  Sent: 9/19/2022   9:30 AM EDT  To: [facility] Clinical Support P  Subject: Symbicort Prescription                           I have been waiting for a response to this message for a few days. Could someone get Selina to send a Symbicort prescription to [facility] in [place] as requested above? I am nearing the end of my medicine.

## 2022-10-06 ENCOUNTER — TELEPHONE (OUTPATIENT)
Dept: PRIMARY CARE | Facility: CLINIC | Age: 67
End: 2022-10-06
Payer: MEDICARE

## 2022-10-06 RX ORDER — AMOXICILLIN 500 MG/1
2000 TABLET, FILM COATED ORAL DAILY PRN
Qty: 12 TABLET | Refills: 1 | Status: SHIPPED | OUTPATIENT
Start: 2022-10-06 | End: 2022-10-09

## 2022-10-06 NOTE — TELEPHONE ENCOUNTER
Pt stopped into the office asking for an antibiotic to be sent to her pharmacy for her dental work she is having on Tuesday. Pt said that usually the dentist that she was seeing was prescribing it but this time she is seeing someone else and they will not prescribe. She is not sure of dosage but knows it is Amoxicillin 4 tablets one hour prior to dental work.     Please send order to [facility] [place].

## 2022-11-02 ENCOUNTER — CONSULT (OUTPATIENT)
Dept: PRIMARY CARE | Facility: CLINIC | Age: 67
End: 2022-11-02
Payer: MEDICARE

## 2022-11-02 VITALS
DIASTOLIC BLOOD PRESSURE: 86 MMHG | OXYGEN SATURATION: 97 % | SYSTOLIC BLOOD PRESSURE: 128 MMHG | HEART RATE: 60 BPM | WEIGHT: 130 LBS | BODY MASS INDEX: 23.04 KG/M2 | HEIGHT: 63 IN

## 2022-11-02 DIAGNOSIS — I10 PRIMARY HYPERTENSION: ICD-10-CM

## 2022-11-02 DIAGNOSIS — Z01.818 PREOP EXAMINATION: Primary | ICD-10-CM

## 2022-11-02 PROCEDURE — 93000 ELECTROCARDIOGRAM COMPLETE: CPT | Performed by: NURSE PRACTITIONER

## 2022-11-02 PROCEDURE — G8752 SYS BP LESS 140: HCPCS | Performed by: NURSE PRACTITIONER

## 2022-11-02 PROCEDURE — G8754 DIAS BP LESS 90: HCPCS | Performed by: NURSE PRACTITIONER

## 2022-11-02 PROCEDURE — 99214 OFFICE O/P EST MOD 30 MIN: CPT | Performed by: NURSE PRACTITIONER

## 2022-11-02 ASSESSMENT — ENCOUNTER SYMPTOMS
NERVOUS/ANXIOUS: 0
SINUS PAIN: 0
NUMBNESS: 0
DIARRHEA: 0
DIFFICULTY URINATING: 0
DECREASED CONCENTRATION: 0
FEVER: 0
BACK PAIN: 0
CONSTIPATION: 0
COUGH: 0
ARTHRALGIAS: 1
ACTIVITY CHANGE: 0
ABDOMINAL DISTENTION: 0
SHORTNESS OF BREATH: 0
MYALGIAS: 0
ABDOMINAL PAIN: 0
NAUSEA: 0
NECK PAIN: 0
SLEEP DISTURBANCE: 0
EYE ITCHING: 0
NECK STIFFNESS: 0
BRUISES/BLEEDS EASILY: 0
RHINORRHEA: 0
EYE DISCHARGE: 0
FREQUENCY: 0
SORE THROAT: 0
TROUBLE SWALLOWING: 0
SINUS PRESSURE: 0
HEADACHES: 0
WEAKNESS: 0
FATIGUE: 0
APPETITE CHANGE: 0

## 2022-11-02 NOTE — LETTER
November 2, 2022     Patient: Betina Malhotra  YOB: 1955  Date of Visit: 11/2/2022    To Whom It May Concern:    It is my medical opinion that Betina Malhotra is unable to exercise in a gym setting from 11/14/2022 for 3 months time. .    If you have any questions or concerns, please don't hesitate to call.         Sincerely,    ..      TERESA Arevalo    CC: No Recipients

## 2022-11-02 NOTE — PROGRESS NOTES
"      Subjective      Patient ID: Betina Malhotra is a 66 y.o. female.  1955      HPI  Presents for preoperative evaluation prior to left foot lapiplasty for painful bunion. She denies SOB, CP, palpitations and TRAVIS. She exercises regularly with no concerns.     The following have been reviewed and updated as appropriate in this visit:        Review of Systems   Constitutional: Negative for activity change, appetite change, fatigue and fever.   HENT: Negative for congestion, ear pain, rhinorrhea, sinus pressure, sinus pain, sore throat and trouble swallowing.    Eyes: Negative for discharge, itching and visual disturbance.   Respiratory: Negative for cough and shortness of breath.    Cardiovascular: Negative for chest pain and leg swelling.   Gastrointestinal: Negative for abdominal distention, abdominal pain, constipation, diarrhea and nausea.   Endocrine: Negative for cold intolerance and heat intolerance.   Genitourinary: Negative for difficulty urinating, frequency and urgency.   Musculoskeletal: Positive for arthralgias. Negative for back pain, myalgias, neck pain and neck stiffness.   Skin: Negative for rash.   Allergic/Immunologic: Negative for environmental allergies.   Neurological: Negative for weakness, numbness and headaches.   Hematological: Does not bruise/bleed easily.   Psychiatric/Behavioral: Negative for decreased concentration and sleep disturbance. The patient is not nervous/anxious.        Objective     Vitals:    11/02/22 1127   BP: 128/86   Pulse: 60   SpO2: 97%   Weight: 59 kg (130 lb)   Height: 1.588 m (5' 2.5\")     Body mass index is 23.4 kg/m².    Physical Exam  Vitals reviewed.   Constitutional:       Appearance: She is well-developed.   HENT:      Head: Normocephalic and atraumatic.      Right Ear: Tympanic membrane, ear canal and external ear normal.      Left Ear: Tympanic membrane, ear canal and external ear normal.      Nose: Nose normal.   Eyes:      Conjunctiva/sclera: " Conjunctivae normal.      Pupils: Pupils are equal, round, and reactive to light.   Cardiovascular:      Rate and Rhythm: Normal rate and regular rhythm.      Heart sounds: Normal heart sounds.   Pulmonary:      Effort: Pulmonary effort is normal.      Breath sounds: Normal breath sounds.   Musculoskeletal:         General: Normal range of motion.      Cervical back: Normal range of motion and neck supple.   Skin:     General: Skin is warm and dry.   Neurological:      Mental Status: She is alert and oriented to person, place, and time.   Psychiatric:         Behavior: Behavior normal.         Thought Content: Thought content normal.         Judgment: Judgment normal.         Assessment/Plan   Diagnoses and all orders for this visit:    Preop examination (Primary)  -     ECG 12 LEAD OFFICE PERFORMED    Primary hypertension  -     ECG 12 LEAD OFFICE PERFORMED      EKG completed in office NSB, reviewed with Dr Barnett. Meg's Class I intraoperative risk. Form completed and faxed to surgeons office. All questions and concerns addressed.    TERESA Arevalo

## 2022-12-05 RX ORDER — VALACYCLOVIR HYDROCHLORIDE 1 G/1
TABLET, FILM COATED ORAL
Qty: 30 TABLET | Refills: 0 | Status: SHIPPED | OUTPATIENT
Start: 2022-12-05 | End: 2024-09-05 | Stop reason: SDUPTHER

## 2022-12-05 NOTE — TELEPHONE ENCOUNTER
Medicine Refill Request    Last Office: 4/21/2022   Last Consult Visit: 11/2/2022  Last Telemedicine Visit: Visit date not found    Next Appointment: Visit date not found      Current Outpatient Medications:   •  albuterol HFA (VENTOLIN HFA) 90 mcg/actuation inhaler, Inhale 2 puffs every 6 (six) hours as needed for wheezing., Disp: , Rfl:   •  ARMOUR THYROID 60 mg tablet, TAKE 3/4 (0.75) TABLET ONCE A DAY, Disp: , Rfl:   •  budesonide-formoteroL (SYMBICORT) 160-4.5 mcg/actuation inhaler, Inhale 2 puffs 2 (two) times a day. Rinse mouth with water after use to reduce aftertaste and incidence of candidiasis. Do not swallow., Disp: 10.2 g, Rfl: 3  •  calcium carbonate/vitamin D3 (CALTRATE 600 + D ORAL), Take by mouth every evening.  , Disp: , Rfl:   •  hydrochlorothiazide 12.5 mg tablet, Take 1 tablet (12.5 mg total) by mouth daily., Disp: 90 tablet, Rfl: 1  •  losartan 100 mg tablet, Take 1 tablet (100 mg total) by mouth daily., Disp: 90 tablet, Rfl: 1  •  MAGNESIUM ORAL, Take 400 mg by mouth every evening.  , Disp: , Rfl:   •  montelukast (SINGULAIR) 10 mg tablet, TAKE 1 TABLET BY MOUTH EVERY DAY, Disp: 90 tablet, Rfl: 0  •  valACYclovir (VALTREX) 1 gram tablet, TAKE 1 TABLET BY MOUTH TWICE A DAY FOR 7 DAYS, Disp: 30 tablet, Rfl: 0      BP Readings from Last 3 Encounters:   11/02/22 128/86   04/21/22 138/90   03/01/21 126/84       Recent Lab results:  Lab Results   Component Value Date    CHOL 215 (H) 05/19/2022   ,   Lab Results   Component Value Date    HDL 95 05/19/2022   ,   Lab Results   Component Value Date    LDLCALC 110 (H) 05/19/2022   ,   Lab Results   Component Value Date    TRIG 54 05/19/2022        Lab Results   Component Value Date    GLUCOSE 104 (H) 05/19/2022   , No results found for: HGBA1C      Lab Results   Component Value Date    CREATININE 1.07 (H) 05/19/2022       Lab Results   Component Value Date    TSH 0.700 05/19/2022

## 2022-12-11 RX ORDER — LOSARTAN POTASSIUM 100 MG/1
100 TABLET ORAL DAILY
Qty: 90 TABLET | Refills: 1 | Status: SHIPPED | OUTPATIENT
Start: 2022-12-11 | End: 2023-06-05

## 2022-12-11 RX ORDER — HYDROCHLOROTHIAZIDE 12.5 MG/1
12.5 TABLET ORAL DAILY
Qty: 90 TABLET | Refills: 1 | Status: SHIPPED | OUTPATIENT
Start: 2022-12-11 | End: 2023-06-05

## 2023-02-20 NOTE — PROGRESS NOTES
Cardiology  Office Consult Note         Reason for visit:   Chief Complaint   Patient presents with   • Cardiac Evaluation       HPI     Betina Malhotra is a 67 y.o. female who presents to the office for cardiovascular evaluation.    She was referred by her PCP to establish care.    She has a history of hypertension and mitral insufficiency.    She was preciously followed by Dr. Prieto Colbert.  Her last visit with him was on 20 and her last echo was on 20.    She reports her blood pressure has been well controlled on Losartan 100mg daily and HCTZ 12.5mg daily for at least 4 years.    She has not been checking her BP at home but is planning to start.  She will bring her cuff and log to next visit.    She denies any chest pain, shortness of breath, edema, palpitations, near syncope or syncope.    She goes to the [facility] 3 to 5 days a week.  She typically takes 1 hour cardio, body pump or step classes.    Her blood pressure is elevated in the office today as she was rushing out this morning and did not take her BP medications.    FLP 22:  , TG 54, LDL 95,       Outside records reviewed..    Past Medical History:   Diagnosis Date   • Anemia    • Arthritis    • Asthma    • Hypertension    • Hypothyroidism    • Murmur    • PONV (postoperative nausea and vomiting)    • Seasonal allergies        Past Surgical History:   Procedure Laterality Date   • BUNIONECTOMY Left    • COLONOSCOPY     • KNEE ARTHROSCOPY Left    • TONSILLECTOMY          Social History     Tobacco Use   Smoking Status Never   Smokeless Tobacco Never     Social History     Tobacco Use   • Smoking status: Never   • Smokeless tobacco: Never   Vaping Use   • Vaping Use: Never used   Substance Use Topics   • Alcohol use: Yes     Comment: occasionally   • Drug use: No       Family History   Problem Relation Age of Onset   • Hypertension Biological Mother    • Pancreatic cancer Biological Mother           at age 80   • Pancreatic cancer Biological Father          at age 52   • Heart disease Biological Brother         hx of stents in his 50s   • Hypertension Biological Brother    • No Known Problems Biological Brother    • Hypertension Biological Brother        Allergies:  Dog dander    Current Outpatient Medications   Medication Sig Dispense Refill   • albuterol HFA (VENTOLIN HFA) 90 mcg/actuation inhaler Inhale 2 puffs every 6 (six) hours as needed for wheezing.     • ARMOUR THYROID 60 mg tablet TAKE 3/4 (0.75) TABLET ONCE A DAY     • budesonide-formoteroL (SYMBICORT) 160-4.5 mcg/actuation inhaler Inhale 2 puffs 2 (two) times a day. Rinse mouth with water after use to reduce aftertaste and incidence of candidiasis. Do not swallow. 10.2 g 3   • calcium carbonate/vitamin D3 (CALTRATE 600 + D ORAL) Take by mouth every evening.       • hydrochlorothiazide (HYDRODIURIL) 12.5 mg tablet Take 1 tablet (12.5 mg total) by mouth daily. 90 tablet 1   • losartan (COZAAR) 100 mg tablet Take 1 tablet (100 mg total) by mouth daily. 90 tablet 1   • MAGNESIUM ORAL Take 400 mg by mouth every evening.       • montelukast (SINGULAIR) 10 mg tablet TAKE 1 TABLET BY MOUTH EVERY DAY (Patient taking differently: Take 10 mg by mouth as needed.) 90 tablet 0   • valACYclovir (VALTREX) 1 gram tablet TAKE 1 TABLET BY MOUTH TWICE A DAY FOR 7 DAYS (Patient taking differently: as needed.) 30 tablet 0     No current facility-administered medications for this visit.       Review of Systems   Constitutional: Negative for malaise/fatigue.   Cardiovascular: Negative for chest pain, dyspnea on exertion, irregular heartbeat, leg swelling, near-syncope, orthopnea, palpitations and syncope.   Hematologic/Lymphatic: Does not bruise/bleed easily.   Neurological: Positive for headaches. Negative for dizziness and light-headedness.   All other systems reviewed and are negative.      Objective     Vitals:    23 0914   BP: (!) 148/100   Pulse: (!) 59    SpO2: 97%       Wt Readings from Last 1 Encounters:   02/22/23 62 kg (136 lb 9.6 oz)       Body mass index is 24.2 kg/m².    Physical Exam  Vitals and nursing note reviewed.   Constitutional:       General: She is not in acute distress.     Appearance: Normal appearance.   HENT:      Head: Normocephalic.   Eyes:      Extraocular Movements: Extraocular movements intact.   Cardiovascular:      Rate and Rhythm: Normal rate and regular rhythm.      Pulses: Normal pulses.      Heart sounds: Normal heart sounds.   Pulmonary:      Effort: Pulmonary effort is normal. No respiratory distress.      Breath sounds: Normal breath sounds. No wheezing or rales.   Abdominal:      General: There is no distension.      Palpations: Abdomen is soft.   Musculoskeletal:         General: Normal range of motion.      Cervical back: Normal range of motion.      Right lower leg: No edema.      Left lower leg: No edema.   Skin:     General: Skin is warm and dry.      Findings: No rash.   Neurological:      General: No focal deficit present.      Mental Status: She is alert and oriented to person, place, and time.   Psychiatric:         Mood and Affect: Mood normal.         Behavior: Behavior normal.         Thought Content: Thought content normal.         Judgment: Judgment normal.           ECG: Normal sinus rhythm, delayed R wave transition-cannot rule out old anterior MI, first-degree AV block    Hematology  Lab Results   Component Value Date    WBC 3.6 05/19/2022    HGB 12.0 05/19/2022    HCT 35.5 05/19/2022     05/19/2022       Chemistries  Lab Results   Component Value Date     05/19/2022    K 4.1 05/19/2022     05/19/2022    CREATININE 1.07 (H) 05/19/2022    BUN 26 05/19/2022    CO2 22 05/19/2022    GLUCOSE 104 (H) 05/19/2022    CALCIUM 8.3 (L) 08/30/2018    ALT 17 05/19/2022    AST 31 05/19/2022       Cholesterol  Lab Results   Component Value Date    CHOL 215 (H) 05/19/2022    TRIG 54 05/19/2022    HDL 95  05/19/2022    LDLCALC 110 (H) 05/19/2022       Endocrine  Lab Results   Component Value Date    TSH 0.700 05/19/2022       Assessment/Plan     Primary hypertension  The patient's blood pressure is mildly elevated in the office today.  The patient has not yet taken her antihypertensive therapy today and so she will take it when she gets home.  I asked her to monitor her blood pressure at home and to return to the office in about 1 month at which time we can decide whether or not her antihypertensive therapies adequate.  She will use 140 systolic and 85 diastolic as measures of adequacy.    Mild intermittent asthma without complication  This problem is currently quiescent.  The patient will follow with her usual physicians with regard to this issue.    Murmur  The patient apparently has mild to perhaps moderate mitral insufficiency.  Her murmur is barely audible on examination.  We will repeat an echocardiogram so that we may evaluate her mitral insufficiency and make plans for follow-up.    Acquired hypothyroidism  The patient will continue thyroid supplementation and will follow up with her primary care provider in this regard.    No orders of the defined types were placed in this encounter.      There are no discontinued medications.    Orders Placed This Encounter   Procedures   • ECG 12 lead     Scheduling Instructions:      PLEASE USE THIS ORDER FOR ECG'S PERFORMED IN PHYSICIAN OFFICES     Order Specific Question:   Release to patient     Answer:   Immediate   • Transthoracic echo (TTE) complete     Standing Status:   Future     Standing Expiration Date:   2/22/2024     Scheduling Instructions:      To schedule cardiology appointments with [facility], call Central Scheduling at [phone]. Thank you.     Order Specific Question:   Is Definity and/or agitated saline (bubbles) indicated for the study?     Answer:   No     Order Specific Question:   Release to patient     Answer:   Immediate       Follow  Up Plans:  Return in about 4 weeks (around 3/22/2023) for Recheck.          I, Leslie Avila, am scribing for, and in the presence of, Esteban Veronica MD.    I, Esteban Veronica MD, personally performed the services described in this documentation as scribed by Leslie Avila in my presence, and it is both accurate and complete.     Esteban Veronica MD  2/22/2023

## 2023-02-22 ENCOUNTER — TELEPHONE (OUTPATIENT)
Dept: CARDIOLOGY | Facility: CLINIC | Age: 68
End: 2023-02-22

## 2023-02-22 ENCOUNTER — OFFICE VISIT (OUTPATIENT)
Dept: CARDIOLOGY | Facility: CLINIC | Age: 68
End: 2023-02-22
Payer: MEDICARE

## 2023-02-22 VITALS
DIASTOLIC BLOOD PRESSURE: 100 MMHG | HEART RATE: 59 BPM | OXYGEN SATURATION: 97 % | BODY MASS INDEX: 24.2 KG/M2 | SYSTOLIC BLOOD PRESSURE: 148 MMHG | WEIGHT: 136.6 LBS | HEIGHT: 63 IN

## 2023-02-22 DIAGNOSIS — E03.9 ACQUIRED HYPOTHYROIDISM: ICD-10-CM

## 2023-02-22 DIAGNOSIS — I10 PRIMARY HYPERTENSION: Primary | ICD-10-CM

## 2023-02-22 DIAGNOSIS — J45.20 MILD INTERMITTENT ASTHMA WITHOUT COMPLICATION: ICD-10-CM

## 2023-02-22 DIAGNOSIS — R01.1 MURMUR: ICD-10-CM

## 2023-02-22 PROCEDURE — G8755 DIAS BP > OR = 90: HCPCS | Performed by: INTERNAL MEDICINE

## 2023-02-22 PROCEDURE — 93000 ELECTROCARDIOGRAM COMPLETE: CPT | Performed by: INTERNAL MEDICINE

## 2023-02-22 PROCEDURE — 99204 OFFICE O/P NEW MOD 45 MIN: CPT | Performed by: INTERNAL MEDICINE

## 2023-02-22 PROCEDURE — G8753 SYS BP > OR = 140: HCPCS | Performed by: INTERNAL MEDICINE

## 2023-02-22 ASSESSMENT — ENCOUNTER SYMPTOMS
ORTHOPNEA: 0
LIGHT-HEADEDNESS: 0
SYNCOPE: 0
NEAR-SYNCOPE: 0
DIZZINESS: 0
HEADACHES: 1
BRUISES/BLEEDS EASILY: 0
PALPITATIONS: 0
IRREGULAR HEARTBEAT: 0
DYSPNEA ON EXERTION: 0

## 2023-02-22 NOTE — LETTER
February 22, 2023     TERESA Torres  [address]  [address]  [place] [id number]    Patient: Betina Malhotra  YOB: 1955  Date of Visit: 2/22/2023      Dear Dr. Paz:    Thank you for referring Betina Malhotra to me for evaluation. Below are my notes for this consultation.    If you have questions, please do not hesitate to call me. I look forward to following your patient along with you.         Sincerely,        Esteban Veronica MD        CC: No Recipients  Esteban Veronica MD  2/22/2023 10:02 AM  Signed       Cardiology  Office Consult Note         Reason for visit:   Chief Complaint   Patient presents with   • Cardiac Evaluation       HPI      Betina Malhotra is a 67 y.o. female who presents to the office for cardiovascular evaluation.    She was referred by her PCP to establish care.    She has a history of hypertension and mitral insufficiency.    She was preciously followed by Dr. Prieto Colbert.  Her last visit with him was on 5/14/20 and her last echo was on 5/28/20.    She reports her blood pressure has been well controlled on Losartan 100mg daily and HCTZ 12.5mg daily for at least 4 years.    She has not been checking her BP at home but is planning to start.  She will bring her cuff and log to next visit.    She denies any chest pain, shortness of breath, edema, palpitations, near syncope or syncope.    She goes to the [facility] 3 to 5 days a week.  She typically takes 1 hour cardio, body pump or step classes.    Her blood pressure is elevated in the office today as she was rushing out this morning and did not take her BP medications.    FLP 5/19/22:  , TG 54, LDL 95,       Outside records reviewed..    Past Medical History:   Diagnosis Date   • Anemia    • Arthritis    • Asthma    • Hypertension    • Hypothyroidism    • Murmur    • PONV (postoperative nausea and vomiting)    • Seasonal allergies        Past Surgical History:    Procedure Laterality Date   • BUNIONECTOMY Left    • COLONOSCOPY     • KNEE ARTHROSCOPY Left    • TONSILLECTOMY          Social History     Tobacco Use   Smoking Status Never   Smokeless Tobacco Never     Social History     Tobacco Use   • Smoking status: Never   • Smokeless tobacco: Never   Vaping Use   • Vaping Use: Never used   Substance Use Topics   • Alcohol use: Yes     Comment: occasionally   • Drug use: No       Family History   Problem Relation Age of Onset   • Hypertension Biological Mother    • Pancreatic cancer Biological Mother          at age 80   • Pancreatic cancer Biological Father          at age 52   • Heart disease Biological Brother         hx of stents in his 50s   • Hypertension Biological Brother    • No Known Problems Biological Brother    • Hypertension Biological Brother        Allergies:  Dog dander    Current Outpatient Medications   Medication Sig Dispense Refill   • albuterol HFA (VENTOLIN HFA) 90 mcg/actuation inhaler Inhale 2 puffs every 6 (six) hours as needed for wheezing.     • ARMOUR THYROID 60 mg tablet TAKE 3/4 (0.75) TABLET ONCE A DAY     • budesonide-formoteroL (SYMBICORT) 160-4.5 mcg/actuation inhaler Inhale 2 puffs 2 (two) times a day. Rinse mouth with water after use to reduce aftertaste and incidence of candidiasis. Do not swallow. 10.2 g 3   • calcium carbonate/vitamin D3 (CALTRATE 600 + D ORAL) Take by mouth every evening.       • hydrochlorothiazide (HYDRODIURIL) 12.5 mg tablet Take 1 tablet (12.5 mg total) by mouth daily. 90 tablet 1   • losartan (COZAAR) 100 mg tablet Take 1 tablet (100 mg total) by mouth daily. 90 tablet 1   • MAGNESIUM ORAL Take 400 mg by mouth every evening.       • montelukast (SINGULAIR) 10 mg tablet TAKE 1 TABLET BY MOUTH EVERY DAY (Patient taking differently: Take 10 mg by mouth as needed.) 90 tablet 0   • valACYclovir (VALTREX) 1 gram tablet TAKE 1 TABLET BY MOUTH TWICE A DAY FOR 7 DAYS (Patient taking differently: as needed.) 30  tablet 0     No current facility-administered medications for this visit.       Review of Systems   Constitutional: Negative for malaise/fatigue.   Cardiovascular: Negative for chest pain, dyspnea on exertion, irregular heartbeat, leg swelling, near-syncope, orthopnea, palpitations and syncope.   Hematologic/Lymphatic: Does not bruise/bleed easily.   Neurological: Positive for headaches. Negative for dizziness and light-headedness.   All other systems reviewed and are negative.      Objective      Vitals:    02/22/23 0914   BP: (!) 148/100   Pulse: (!) 59   SpO2: 97%       Wt Readings from Last 1 Encounters:   02/22/23 62 kg (136 lb 9.6 oz)       Body mass index is 24.2 kg/m².    Physical Exam  Vitals and nursing note reviewed.   Constitutional:       General: She is not in acute distress.     Appearance: Normal appearance.   HENT:      Head: Normocephalic.   Eyes:      Extraocular Movements: Extraocular movements intact.   Cardiovascular:      Rate and Rhythm: Normal rate and regular rhythm.      Pulses: Normal pulses.      Heart sounds: Normal heart sounds.   Pulmonary:      Effort: Pulmonary effort is normal. No respiratory distress.      Breath sounds: Normal breath sounds. No wheezing or rales.   Abdominal:      General: There is no distension.      Palpations: Abdomen is soft.   Musculoskeletal:         General: Normal range of motion.      Cervical back: Normal range of motion.      Right lower leg: No edema.      Left lower leg: No edema.   Skin:     General: Skin is warm and dry.      Findings: No rash.   Neurological:      General: No focal deficit present.      Mental Status: She is alert and oriented to person, place, and time.   Psychiatric:         Mood and Affect: Mood normal.         Behavior: Behavior normal.         Thought Content: Thought content normal.         Judgment: Judgment normal.           ECG: Normal sinus rhythm, delayed R wave transition-cannot rule out old anterior MI, first-degree  AV block    Hematology  Lab Results   Component Value Date    WBC 3.6 05/19/2022    HGB 12.0 05/19/2022    HCT 35.5 05/19/2022     05/19/2022       Chemistries  Lab Results   Component Value Date     05/19/2022    K 4.1 05/19/2022     05/19/2022    CREATININE 1.07 (H) 05/19/2022    BUN 26 05/19/2022    CO2 22 05/19/2022    GLUCOSE 104 (H) 05/19/2022    CALCIUM 8.3 (L) 08/30/2018    ALT 17 05/19/2022    AST 31 05/19/2022       Cholesterol  Lab Results   Component Value Date    CHOL 215 (H) 05/19/2022    TRIG 54 05/19/2022    HDL 95 05/19/2022    LDLCALC 110 (H) 05/19/2022       Endocrine  Lab Results   Component Value Date    TSH 0.700 05/19/2022       Assessment/Plan      Primary hypertension  The patient's blood pressure is mildly elevated in the office today.  The patient has not yet taken her antihypertensive therapy today and so she will take it when she gets home.  I asked her to monitor her blood pressure at home and to return to the office in about 1 month at which time we can decide whether or not her antihypertensive therapies adequate.  She will use 140 systolic and 85 diastolic as measures of adequacy.    Mild intermittent asthma without complication  This problem is currently quiescent.  The patient will follow with her usual physicians with regard to this issue.    Murmur  The patient apparently has mild to perhaps moderate mitral insufficiency.  Her murmur is barely audible on examination.  We will repeat an echocardiogram so that we may evaluate her mitral insufficiency and make plans for follow-up.    Acquired hypothyroidism  The patient will continue thyroid supplementation and will follow up with her primary care provider in this regard.    No orders of the defined types were placed in this encounter.      There are no discontinued medications.    Orders Placed This Encounter   Procedures   • ECG 12 lead     Scheduling Instructions:      PLEASE USE THIS ORDER FOR ECG'S PERFORMED  IN PHYSICIAN OFFICES     Order Specific Question:   Release to patient     Answer:   Immediate   • Transthoracic echo (TTE) complete     Standing Status:   Future     Standing Expiration Date:   2/22/2024     Scheduling Instructions:      To schedule cardiology appointments with [facility], call Central Scheduling at [phone]. Thank you.     Order Specific Question:   Is Definity and/or agitated saline (bubbles) indicated for the study?     Answer:   No     Order Specific Question:   Release to patient     Answer:   Immediate       Follow Up Plans:  Return in about 4 weeks (around 3/22/2023) for Recheck.         I, Leslie Avila, am scribing for, and in the presence of, Esteban Veronica MD.    IEsteban MD, personally performed the services described in this documentation as scribed by Leslie Avila in my presence, and it is both accurate and complete.     Esteban Veronica MD  2/22/2023

## 2023-02-22 NOTE — TELEPHONE ENCOUNTER
Pt. Needs a Pre-cert    Pt is having a  TRANSTHORACIC ECHO (TTE) COMPLETE CPT-[id number] at [facility] on 03/29/2023    NPI-[id number]    Thanks Grace

## 2023-02-22 NOTE — ASSESSMENT & PLAN NOTE
The patient will continue thyroid supplementation and will follow up with her primary care provider in this regard.

## 2023-02-22 NOTE — ASSESSMENT & PLAN NOTE
The patient apparently has mild to perhaps moderate mitral insufficiency.  Her murmur is barely audible on examination.  We will repeat an echocardiogram so that we may evaluate her mitral insufficiency and make plans for follow-up.

## 2023-02-22 NOTE — ASSESSMENT & PLAN NOTE
The patient's blood pressure is mildly elevated in the office today.  The patient has not yet taken her antihypertensive therapy today and so she will take it when she gets home.  I asked her to monitor her blood pressure at home and to return to the office in about 1 month at which time we can decide whether or not her antihypertensive therapies adequate.  She will use 140 systolic and 85 diastolic as measures of adequacy.

## 2023-02-22 NOTE — ASSESSMENT & PLAN NOTE
This problem is currently quiescent.  The patient will follow with her usual physicians with regard to this issue.

## 2023-03-21 NOTE — PROGRESS NOTES
Cardiology  Office Progress Note         Reason for visit:   Chief Complaint   Patient presents with   • Follow-up       HPI     Betina Malhotra is a 67 y.o. female who presents to the office for cardiovascular follow up and management of hypertension and mitral insufficiency.     She was seen in the office as a new patient on 23.  At that I visit her blood pressure was mildly elevated.  I asked her to begin monitoring her blood pressure at home.  I also ordered an echcoardiogram and and asked her to return to the office in about 1 month.     Her echo was completed in the office today.     Her home BPs have been running between 115/77 and 146/84.    She denies any chest pain, shortness of breath, edema, palpitations, near syncope or syncope.     BP on her machine in the office was 150/94 and manual reading was 132/84.      Past Medical History:   Diagnosis Date   • Anemia    • Arthritis    • Asthma    • Hypertension    • Hypothyroidism    • Murmur    • PONV (postoperative nausea and vomiting)    • Seasonal allergies        Past Surgical History:   Procedure Laterality Date   • BUNIONECTOMY Left    • COLONOSCOPY     • KNEE ARTHROSCOPY Left    • TONSILLECTOMY         Social History     Tobacco Use   • Smoking status: Never   • Smokeless tobacco: Never   Vaping Use   • Vaping status: Never Used   Substance Use Topics   • Alcohol use: Yes     Comment: occasionally   • Drug use: No       Family History   Problem Relation Age of Onset   • Hypertension Biological Mother    • Pancreatic cancer Biological Mother          at age 80   • Pancreatic cancer Biological Father          at age 52   • Heart disease Biological Brother         hx of stents in his 50s   • Hypertension Biological Brother    • No Known Problems Biological Brother    • Hypertension Biological Brother        Allergies:  Dog dander    Current Outpatient Medications   Medication Sig Dispense Refill   • albuterol HFA (VENTOLIN HFA) 90  mcg/actuation inhaler Inhale 2 puffs every 6 (six) hours as needed for wheezing.     • ARMOUR THYROID 60 mg tablet TAKE 3/4 (0.75) TABLET ONCE A DAY     • budesonide-formoteroL (SYMBICORT) 160-4.5 mcg/actuation inhaler Inhale 2 puffs 2 (two) times a day. Rinse mouth with water after use to reduce aftertaste and incidence of candidiasis. Do not swallow. 10.2 g 3   • calcium carbonate/vitamin D3 (CALTRATE 600 + D ORAL) Take by mouth every evening.       • hydrochlorothiazide (HYDRODIURIL) 12.5 mg tablet Take 1 tablet (12.5 mg total) by mouth daily. 90 tablet 1   • losartan (COZAAR) 100 mg tablet Take 1 tablet (100 mg total) by mouth daily. 90 tablet 1   • MAGNESIUM ORAL Take 400 mg by mouth every evening.       • montelukast (SINGULAIR) 10 mg tablet TAKE 1 TABLET BY MOUTH EVERY DAY (Patient taking differently: Take 10 mg by mouth as needed.) 90 tablet 0   • valACYclovir (VALTREX) 1 gram tablet TAKE 1 TABLET BY MOUTH TWICE A DAY FOR 7 DAYS (Patient taking differently: as needed.) 30 tablet 0     No current facility-administered medications for this visit.       ROS    Objective     Vitals:    03/29/23 1213   BP: 134/82   Pulse: (!) 55   SpO2: 99%       Wt Readings from Last 5 Encounters:   03/29/23 60.8 kg (134 lb)   03/29/23 61 kg (134 lb 7.7 oz)   02/22/23 62 kg (136 lb 9.6 oz)   11/02/22 59 kg (130 lb)   04/21/22 59.5 kg (131 lb 3.2 oz)       Body mass index is 23.74 kg/m².    Physical Exam  Vitals and nursing note reviewed.   Constitutional:       General: She is not in acute distress.     Appearance: Normal appearance.   HENT:      Head: Normocephalic.   Eyes:      Extraocular Movements: Extraocular movements intact.   Cardiovascular:      Rate and Rhythm: Normal rate and regular rhythm.      Pulses: Normal pulses.      Heart sounds: Normal heart sounds.   Pulmonary:      Effort: Pulmonary effort is normal. No respiratory distress.      Breath sounds: Normal breath sounds. No wheezing or rales.   Abdominal:       General: There is no distension.      Palpations: Abdomen is soft.   Musculoskeletal:         General: Normal range of motion.      Cervical back: Normal range of motion.      Right lower leg: No edema.      Left lower leg: No edema.   Skin:     General: Skin is warm and dry.      Findings: No rash.   Neurological:      General: No focal deficit present.      Mental Status: She is alert and oriented to person, place, and time.   Psychiatric:         Mood and Affect: Mood normal.         Behavior: Behavior normal.         Thought Content: Thought content normal.         Judgment: Judgment normal.          ECG: Sinus bradycardia with a mild first-degree AV block, otherwise unremarkable    Hematology  Lab Results   Component Value Date    WBC 3.6 05/19/2022    HGB 12.0 05/19/2022    HCT 35.5 05/19/2022     05/19/2022       Chemistries  Lab Results   Component Value Date     05/19/2022    K 4.1 05/19/2022     05/19/2022    CREATININE 1.07 (H) 05/19/2022    BUN 26 05/19/2022    CO2 22 05/19/2022    GLUCOSE 104 (H) 05/19/2022    CALCIUM 8.3 (L) 08/30/2018    ALT 17 05/19/2022    AST 31 05/19/2022       Cholesterol  Lab Results   Component Value Date    CHOL 215 (H) 05/19/2022    TRIG 54 05/19/2022    HDL 95 05/19/2022    LDLCALC 110 (H) 05/19/2022       Endocrine  Lab Results   Component Value Date    TSH 0.700 05/19/2022       Cardiac Imaging    ECHOCARDIOGRAM 3/29/2023:    Interpretation Summary       •  Left Ventricle: Normal ventricle size. Normal wall thickness. Normal systolic function. Estimated EF 60-65%. Wall motion appears grossly normal. Normal diastolic filling pattern for age.  •  Right Ventricle: Normal ventricle size. Normal systolic function.  •  Left Atrium: Normal sized atrium.  •  Right Atrium: Normal sized atrium.  •  Aortic Valve: Tricuspid valve.  Sclerotic leaflets. No regurgitation. No stenosis. Calculated dimensionless index = 0.89.  •  Mitral Valve: Normal leaflet structure.  Normal leaflet motion. Mild regurgitation. No stenosis.  •  Tricuspid Valve: Structure is grossly normal. Mild regurgitation. Estimated RVSP = 32 mmHg. No significant stenosis.  •  Pulmonic Valve: Normal structure. Trace regurgitation. No stenosis.  •  Aorta: Aortic root grossly normal. Sinuses of Valsalva grossly normal. Ascending aorta grossly normal. Descending aorta grossly normal. Mild dilatation of the ascending aorta.  •  IVC/SVC: Inferior vena cava is dilated (>2.1cm). Inferior vena cava demonstrates normal respiratory collapse.  •  Pericardium: No evidence of pericardial effusion.                 Assessment/Plan     Primary hypertension  I believe the patient's blood pressure is adequately controlled at this point.  She will be following up with her primary care physician and in our office.  I asked her to stagger her visits so that she has a visit every 6 months so that we may monitor her blood pressure in that way.  I made no changes to her medical regimen today.    Murmur  The patient had an echocardiogram performed in the office today.  She has only mild mitral insufficiency but no other significant lesions.  We can certainly periodically repeat her echocardiogram but I do not anticipate that her mitral insufficiency will worsen or cause her any issues going forward.  Incidentally noted was a very slight dilatation of the patient's ascending thoracic aorta at 3.8 cm.  I informed the patient of this finding and will probably ask her to have a repeat echocardiogram in 2 to 3 years.    Mild intermittent asthma without complication  The patient's lung fields are clear today.  She will continue to follow with her usual physicians in this regard.      No orders of the defined types were placed in this encounter.      There are no discontinued medications.    Orders Placed This Encounter   Procedures   • ECG 12 LEAD-OFFICE PERFORMED     Scheduling Instructions:      PLEASE USE THIS ORDER FOR ECG'S PERFORMED IN  PHYSICIAN OFFICES     Order Specific Question:   Release to patient     Answer:   Immediate       Follow Up Plans:  Return in about 1 year (around 3/29/2024) for Recheck.          I, Leslie Avila, am scribing for, and in the presence of, Esteban Veronica MD.    I, Esteban Veronica MD, personally performed the services described in this documentation as scribed by Leslie Avila in my presence, and it is both accurate and complete.       Esteban Veronica MD  3/29/2023

## 2023-03-29 ENCOUNTER — OFFICE VISIT (OUTPATIENT)
Dept: CARDIOLOGY | Facility: CLINIC | Age: 68
End: 2023-03-29
Payer: MEDICARE

## 2023-03-29 ENCOUNTER — HOSPITAL ENCOUNTER (OUTPATIENT)
Dept: CARDIOLOGY | Facility: CLINIC | Age: 68
Discharge: HOME | End: 2023-03-29
Attending: INTERNAL MEDICINE
Payer: MEDICARE

## 2023-03-29 VITALS
SYSTOLIC BLOOD PRESSURE: 132 MMHG | BODY MASS INDEX: 23.83 KG/M2 | DIASTOLIC BLOOD PRESSURE: 74 MMHG | WEIGHT: 134.48 LBS | HEIGHT: 63 IN

## 2023-03-29 VITALS
DIASTOLIC BLOOD PRESSURE: 82 MMHG | SYSTOLIC BLOOD PRESSURE: 134 MMHG | HEIGHT: 63 IN | HEART RATE: 55 BPM | WEIGHT: 134 LBS | OXYGEN SATURATION: 99 % | BODY MASS INDEX: 23.74 KG/M2

## 2023-03-29 DIAGNOSIS — I10 PRIMARY HYPERTENSION: Primary | ICD-10-CM

## 2023-03-29 DIAGNOSIS — R01.1 MURMUR: ICD-10-CM

## 2023-03-29 DIAGNOSIS — I10 PRIMARY HYPERTENSION: ICD-10-CM

## 2023-03-29 DIAGNOSIS — J45.20 MILD INTERMITTENT ASTHMA WITHOUT COMPLICATION: ICD-10-CM

## 2023-03-29 LAB
AORTIC ROOT ANNULUS: 3 CM
AORTIC VALVE MEAN VELOCITY: 0.86 M/S
AORTIC VALVE VELOCITY TIME INTEGRAL: 27 CM
ASCENDING AORTA: 3.8 CM
AV MEAN GRADIENT: 4 MMHG
AV PEAK GRADIENT: 7 MMHG
AV PEAK VELOCITY-S: 1.34 M/S
AV VALVE AREA INDEX: 1.7
AV VALVE AREA: 2.13 CM2
AV VELOCITY RATIO: 0.89
AVA (VTI): 2.8 CM2
BSA FOR ECHO PROCEDURE: 1.65 M2
CUSP SEPARATION: 2.1 CM
DOP CALC LVOT STROKE VOLUME: 75.67 CM3
E WAVE DECELERATION TIME: 292 MS
E/A RATIO: 0.6
E/E' RATIO: 10.4
E/LAT E' RATIO: 7.9
EDV (BP): 51.6 CM3
EF (A4C): 61.4 %
EF A2C: 61.3 %
EJECTION FRACTION: 61 %
EST RIGHT VENT SYSTOLIC PRESSURE BY TRICUSPID REGURGITATION JET: 32 MMHG
ESV (BP): 20.1 CM3
FRACTIONAL SHORTENING: 32.43 %
INTERVENTRICULAR SEPTUM: 0.8 CM
LA ESV (BP): 39.3 CM3
LA ESV INDEX (A2C): 26.42 CM3/M2
LA ESV INDEX (BP): 23.82 CM3/M2
LA/AORTA RATIO: 1.1
LAAS-AP2: 17.3 CM2
LAAS-AP4: 12.6 CM2
LAD 2D: 3.3 CM
LALD A4C: 4.48 CM
LALD A4C: 5.57 CM
LAV-S: 43.6 CM3
LEFT ATRIUM VOLUME INDEX: 17.33 CM3/M2
LEFT ATRIUM VOLUME: 28.6 CM3
LEFT INTERNAL DIMENSION IN SYSTOLE: 2.5 CM (ref 2.32–3.51)
LEFT VENTRICLE DIASTOLIC VOLUME INDEX: 32.48 CM3/M2
LEFT VENTRICLE DIASTOLIC VOLUME: 53.6 CM3
LEFT VENTRICLE SYSTOLIC VOLUME INDEX: 12.55 CM3/M2
LEFT VENTRICLE SYSTOLIC VOLUME: 20.7 CM3
LEFT VENTRICULAR INTERNAL DIMENSION IN DIASTOLE: 3.7 CM (ref 3.9–5.42)
LEFT VENTRICULAR POSTERIOR WALL IN END DIASTOLE: 0.9 CM (ref 0.51–0.94)
LV DIASTOLIC VOLUME: 48.6 CM3
LV ESV (APICAL 2 CHAMBER): 18.8 CM3
LVAD-AP2: 20.8 CM2
LVAD-AP4: 22.1 CM2
LVAS-AP2: 12.1 CM2
LVAS-AP4: 12.5 CM2
LVEDVI(A2C): 29.45 CM3/M2
LVEDVI(BP): 31.27 CM3/M2
LVESVI(A2C): 11.39 CM3/M2
LVESVI(BP): 12.18 CM3/M2
LVLD-AP2: 7.5 CM
LVLD-AP4: 7.72 CM
LVLS-AP2: 6.76 CM
LVLS-AP4: 6.47 CM
LVOT 2D: 2 CM
LVOT A: 3.14 CM2
LVOT MG: 3 MMHG
LVOT MV: 0.73 M/S
LVOT PEAK VELOCITY: 1.16 M/S
LVOT PG: 5 MMHG
LVOT STROKE VOLUME INDEX: 45.86 ML/M2
LVOT VTI: 24.1 CM
MLH CV ECHO AVA INDEX VELOCITY RATIO: 1.3
MV E'TISSUE VEL-LAT: 0.08 M/S
MV E'TISSUE VEL-MED: 0.06 M/S
MV PEAK A VEL: 1.04 M/S
MV PEAK E VEL: 0.61 M/S
POSTERIOR WALL: 0.9 CM
PULMONARY REGURGITATION LATE DIASTOLIC VELOCITY: 0.94 M/S
PV PEAK GRADIENT: 3 MMHG
PV PV: 0.84 M/S
RAP: 10 MMHG
RVOT VMAX: 0.78 M/S
RVOT VTI: 17.8 CM
SEPTAL TISSUE DOPPLER FREE WALL LATE DIA VELOCITY (APICAL 4 CHAMBER VIEW): 0.1 M/S
TR MAX PG: 22.28 MMHG
TRICUSPID VALVE PEAK REGURGITATION VELOCITY: 2.36 M/S
Z-SCORE OF LEFT VENTRICULAR DIMENSION IN END DIASTOLE: -2.17
Z-SCORE OF LEFT VENTRICULAR DIMENSION IN END SYSTOLE: -1.04
Z-SCORE OF LEFT VENTRICULAR POSTERIOR WALL IN END DIASTOLE: 1.39

## 2023-03-29 PROCEDURE — 93000 ELECTROCARDIOGRAM COMPLETE: CPT | Performed by: INTERNAL MEDICINE

## 2023-03-29 PROCEDURE — 99214 OFFICE O/P EST MOD 30 MIN: CPT | Performed by: INTERNAL MEDICINE

## 2023-03-29 PROCEDURE — G8754 DIAS BP LESS 90: HCPCS | Performed by: INTERNAL MEDICINE

## 2023-03-29 PROCEDURE — 93306 TTE W/DOPPLER COMPLETE: CPT | Performed by: INTERNAL MEDICINE

## 2023-03-29 PROCEDURE — G8752 SYS BP LESS 140: HCPCS | Performed by: INTERNAL MEDICINE

## 2023-03-29 NOTE — ASSESSMENT & PLAN NOTE
I believe the patient's blood pressure is adequately controlled at this point.  She will be following up with her primary care physician and in our office.  I asked her to stagger her visits so that she has a visit every 6 months so that we may monitor her blood pressure in that way.  I made no changes to her medical regimen today.

## 2023-03-29 NOTE — ASSESSMENT & PLAN NOTE
The patient had an echocardiogram performed in the office today.  She has only mild mitral insufficiency but no other significant lesions.  We can certainly periodically repeat her echocardiogram but I do not anticipate that her mitral insufficiency will worsen or cause her any issues going forward.  Incidentally noted was a very slight dilatation of the patient's ascending thoracic aorta at 3.8 cm.  I informed the patient of this finding and will probably ask her to have a repeat echocardiogram in 2 to 3 years.

## 2023-03-29 NOTE — ASSESSMENT & PLAN NOTE
The patient's lung fields are clear today.  She will continue to follow with her usual physicians in this regard.

## 2023-03-29 NOTE — LETTER
March 29, 2023     TERESA Torres  [address]  [place] [id number]    Patient: Betina Malhotra  YOB: 1955  Date of Visit: 3/29/2023      Dear Dr. Paz:    Thank you for referring Betina Malhotra to me for evaluation. Below are my notes for this consultation.    If you have questions, please do not hesitate to call me. I look forward to following your patient along with you.         Sincerely,        Esteban Veronica MD        CC: No Recipients    Esteban Veronica MD  3/29/2023  1:00 PM  Signed       Cardiology  Office Progress Note         Reason for visit:   Chief Complaint   Patient presents with   • Follow-up       HPI     Betina Malhotra is a 67 y.o. female who presents to the office for cardiovascular follow up and management of hypertension and mitral insufficiency.     She was seen in the office as a new patient on 2/22/23.  At that I visit her blood pressure was mildly elevated.  I asked her to begin monitoring her blood pressure at home.  I also ordered an echcoardiogram and and asked her to return to the office in about 1 month.     Her echo was completed in the office today.     Her home BPs have been running between 115/77 and 146/84.    She denies any chest pain, shortness of breath, edema, palpitations, near syncope or syncope.     BP on her machine in the office was 150/94 and manual reading was 132/84.      Past Medical History:   Diagnosis Date   • Anemia    • Arthritis    • Asthma    • Hypertension    • Hypothyroidism    • Murmur    • PONV (postoperative nausea and vomiting)    • Seasonal allergies        Past Surgical History:   Procedure Laterality Date   • BUNIONECTOMY Left    • COLONOSCOPY     • KNEE ARTHROSCOPY Left    • TONSILLECTOMY         Social History     Tobacco Use   • Smoking status: Never   • Smokeless tobacco: Never   Vaping Use   • Vaping status: Never Used   Substance Use Topics   • Alcohol use: Yes     Comment: occasionally   •  Drug use: No       Family History   Problem Relation Age of Onset   • Hypertension Biological Mother    • Pancreatic cancer Biological Mother          at age 80   • Pancreatic cancer Biological Father          at age 52   • Heart disease Biological Brother         hx of stents in his 50s   • Hypertension Biological Brother    • No Known Problems Biological Brother    • Hypertension Biological Brother        Allergies:  Dog dander    Current Outpatient Medications   Medication Sig Dispense Refill   • albuterol HFA (VENTOLIN HFA) 90 mcg/actuation inhaler Inhale 2 puffs every 6 (six) hours as needed for wheezing.     • ARMOUR THYROID 60 mg tablet TAKE 3/4 (0.75) TABLET ONCE A DAY     • budesonide-formoteroL (SYMBICORT) 160-4.5 mcg/actuation inhaler Inhale 2 puffs 2 (two) times a day. Rinse mouth with water after use to reduce aftertaste and incidence of candidiasis. Do not swallow. 10.2 g 3   • calcium carbonate/vitamin D3 (CALTRATE 600 + D ORAL) Take by mouth every evening.       • hydrochlorothiazide (HYDRODIURIL) 12.5 mg tablet Take 1 tablet (12.5 mg total) by mouth daily. 90 tablet 1   • losartan (COZAAR) 100 mg tablet Take 1 tablet (100 mg total) by mouth daily. 90 tablet 1   • MAGNESIUM ORAL Take 400 mg by mouth every evening.       • montelukast (SINGULAIR) 10 mg tablet TAKE 1 TABLET BY MOUTH EVERY DAY (Patient taking differently: Take 10 mg by mouth as needed.) 90 tablet 0   • valACYclovir (VALTREX) 1 gram tablet TAKE 1 TABLET BY MOUTH TWICE A DAY FOR 7 DAYS (Patient taking differently: as needed.) 30 tablet 0     No current facility-administered medications for this visit.       ROS    Objective     Vitals:    23 1213   BP: 134/82   Pulse: (!) 55   SpO2: 99%       Wt Readings from Last 5 Encounters:   23 60.8 kg (134 lb)   23 61 kg (134 lb 7.7 oz)   23 62 kg (136 lb 9.6 oz)   22 59 kg (130 lb)   22 59.5 kg (131 lb 3.2 oz)       Body mass index is 23.74  kg/m².    Physical Exam  Vitals and nursing note reviewed.   Constitutional:       General: She is not in acute distress.     Appearance: Normal appearance.   HENT:      Head: Normocephalic.   Eyes:      Extraocular Movements: Extraocular movements intact.   Cardiovascular:      Rate and Rhythm: Normal rate and regular rhythm.      Pulses: Normal pulses.      Heart sounds: Normal heart sounds.   Pulmonary:      Effort: Pulmonary effort is normal. No respiratory distress.      Breath sounds: Normal breath sounds. No wheezing or rales.   Abdominal:      General: There is no distension.      Palpations: Abdomen is soft.   Musculoskeletal:         General: Normal range of motion.      Cervical back: Normal range of motion.      Right lower leg: No edema.      Left lower leg: No edema.   Skin:     General: Skin is warm and dry.      Findings: No rash.   Neurological:      General: No focal deficit present.      Mental Status: She is alert and oriented to person, place, and time.   Psychiatric:         Mood and Affect: Mood normal.         Behavior: Behavior normal.         Thought Content: Thought content normal.         Judgment: Judgment normal.          ECG: Sinus bradycardia with a mild first-degree AV block, otherwise unremarkable    Hematology  Lab Results   Component Value Date    WBC 3.6 05/19/2022    HGB 12.0 05/19/2022    HCT 35.5 05/19/2022     05/19/2022       Chemistries  Lab Results   Component Value Date     05/19/2022    K 4.1 05/19/2022     05/19/2022    CREATININE 1.07 (H) 05/19/2022    BUN 26 05/19/2022    CO2 22 05/19/2022    GLUCOSE 104 (H) 05/19/2022    CALCIUM 8.3 (L) 08/30/2018    ALT 17 05/19/2022    AST 31 05/19/2022       Cholesterol  Lab Results   Component Value Date    CHOL 215 (H) 05/19/2022    TRIG 54 05/19/2022    HDL 95 05/19/2022    LDLCALC 110 (H) 05/19/2022       Endocrine  Lab Results   Component Value Date    TSH 0.700 05/19/2022       Cardiac  Imaging    ECHOCARDIOGRAM 3/29/2023:    Interpretation Summary       •  Left Ventricle: Normal ventricle size. Normal wall thickness. Normal systolic function. Estimated EF 60-65%. Wall motion appears grossly normal. Normal diastolic filling pattern for age.  •  Right Ventricle: Normal ventricle size. Normal systolic function.  •  Left Atrium: Normal sized atrium.  •  Right Atrium: Normal sized atrium.  •  Aortic Valve: Tricuspid valve.  Sclerotic leaflets. No regurgitation. No stenosis. Calculated dimensionless index = 0.89.  •  Mitral Valve: Normal leaflet structure. Normal leaflet motion. Mild regurgitation. No stenosis.  •  Tricuspid Valve: Structure is grossly normal. Mild regurgitation. Estimated RVSP = 32 mmHg. No significant stenosis.  •  Pulmonic Valve: Normal structure. Trace regurgitation. No stenosis.  •  Aorta: Aortic root grossly normal. Sinuses of Valsalva grossly normal. Ascending aorta grossly normal. Descending aorta grossly normal. Mild dilatation of the ascending aorta.  •  IVC/SVC: Inferior vena cava is dilated (>2.1cm). Inferior vena cava demonstrates normal respiratory collapse.  •  Pericardium: No evidence of pericardial effusion.                 Assessment/Plan     Primary hypertension  I believe the patient's blood pressure is adequately controlled at this point.  She will be following up with her primary care physician and in our office.  I asked her to stagger her visits so that she has a visit every 6 months so that we may monitor her blood pressure in that way.  I made no changes to her medical regimen today.    Murmur  The patient had an echocardiogram performed in the office today.  She has only mild mitral insufficiency but no other significant lesions.  We can certainly periodically repeat her echocardiogram but I do not anticipate that her mitral insufficiency will worsen or cause her any issues going forward.  Incidentally noted was a very slight dilatation of the patient's  ascending thoracic aorta at 3.8 cm.  I informed the patient of this finding and will probably ask her to have a repeat echocardiogram in 2 to 3 years.    Mild intermittent asthma without complication  The patient's lung fields are clear today.  She will continue to follow with her usual physicians in this regard.      No orders of the defined types were placed in this encounter.      There are no discontinued medications.    Orders Placed This Encounter   Procedures   • ECG 12 LEAD-OFFICE PERFORMED     Scheduling Instructions:      PLEASE USE THIS ORDER FOR ECG'S PERFORMED IN PHYSICIAN OFFICES     Order Specific Question:   Release to patient     Answer:   Immediate       Follow Up Plans:  Return in about 1 year (around 3/29/2024) for Recheck.         I, Leslie Avila, am scribing for, and in the presence of, Esteban Veronica MD.    IEsteban MD, personally performed the services described in this documentation as scribed by Leslie Avila in my presence, and it is both accurate and complete.       Esteban Veronica MD  3/29/2023

## 2023-06-05 RX ORDER — HYDROCHLOROTHIAZIDE 12.5 MG/1
12.5 TABLET ORAL DAILY
Qty: 90 TABLET | Refills: 1 | Status: SHIPPED | OUTPATIENT
Start: 2023-06-05 | End: 2023-06-22 | Stop reason: SDUPTHER

## 2023-06-05 RX ORDER — LOSARTAN POTASSIUM 100 MG/1
100 TABLET ORAL DAILY
Qty: 90 TABLET | Refills: 1 | Status: SHIPPED | OUTPATIENT
Start: 2023-06-05 | End: 2023-11-29

## 2023-06-13 ENCOUNTER — APPOINTMENT (OUTPATIENT)
Dept: LAB | Facility: HOSPITAL | Age: 68
End: 2023-06-13
Attending: PODIATRIST
Payer: MEDICARE

## 2023-06-13 ENCOUNTER — TRANSCRIBE ORDERS (OUTPATIENT)
Dept: REGISTRATION | Facility: HOSPITAL | Age: 68
End: 2023-06-13

## 2023-06-13 ENCOUNTER — CONSULT (OUTPATIENT)
Dept: PRIMARY CARE | Facility: CLINIC | Age: 68
End: 2023-06-13
Payer: MEDICARE

## 2023-06-13 VITALS
WEIGHT: 130 LBS | DIASTOLIC BLOOD PRESSURE: 74 MMHG | TEMPERATURE: 98.5 F | BODY MASS INDEX: 23.04 KG/M2 | RESPIRATION RATE: 14 BRPM | HEIGHT: 63 IN | SYSTOLIC BLOOD PRESSURE: 138 MMHG | OXYGEN SATURATION: 97 % | HEART RATE: 68 BPM

## 2023-06-13 DIAGNOSIS — Z01.818 ENCOUNTER FOR OTHER PREPROCEDURAL EXAMINATION: ICD-10-CM

## 2023-06-13 DIAGNOSIS — Z01.818 PREOP EXAMINATION: Primary | ICD-10-CM

## 2023-06-13 DIAGNOSIS — S86.011D RUPTURE OF RIGHT ACHILLES TENDON, SUBSEQUENT ENCOUNTER: ICD-10-CM

## 2023-06-13 DIAGNOSIS — I10 ESSENTIAL (PRIMARY) HYPERTENSION: ICD-10-CM

## 2023-06-13 DIAGNOSIS — I10 ESSENTIAL (PRIMARY) HYPERTENSION: Primary | ICD-10-CM

## 2023-06-13 LAB
ALBUMIN SERPL-MCNC: 4.5 G/DL (ref 3.4–5)
ALP SERPL-CCNC: 36 IU/L (ref 35–126)
ALT SERPL-CCNC: 21 IU/L (ref 11–54)
ANION GAP SERPL CALC-SCNC: 5 MEQ/L (ref 3–15)
AST SERPL-CCNC: 28 IU/L (ref 15–41)
BILIRUB SERPL-MCNC: 1.3 MG/DL (ref 0.3–1.2)
BUN SERPL-MCNC: 22 MG/DL (ref 8–20)
CALCIUM SERPL-MCNC: 9.4 MG/DL (ref 8.9–10.3)
CHLORIDE SERPL-SCNC: 106 MEQ/L (ref 98–109)
CO2 SERPL-SCNC: 28 MEQ/L (ref 22–32)
CREAT SERPL-MCNC: 1 MG/DL (ref 0.6–1.1)
ERYTHROCYTE [DISTWIDTH] IN BLOOD BY AUTOMATED COUNT: 13.3 % (ref 11.7–14.4)
GFR SERPL CREATININE-BSD FRML MDRD: 55.3 ML/MIN/1.73M*2
GLUCOSE SERPL-MCNC: 120 MG/DL (ref 70–99)
HCT VFR BLDCO AUTO: 38.4 % (ref 35–45)
HGB BLD-MCNC: 12.5 G/DL (ref 11.8–15.7)
MCH RBC QN AUTO: 30.3 PG (ref 28–33.2)
MCHC RBC AUTO-ENTMCNC: 32.6 G/DL (ref 32.2–35.5)
MCV RBC AUTO: 93 FL (ref 83–98)
PDW BLD AUTO: 10.9 FL (ref 9.4–12.3)
PLATELET # BLD AUTO: 274 K/UL (ref 150–369)
POTASSIUM SERPL-SCNC: 3.8 MEQ/L (ref 3.6–5.1)
PROT SERPL-MCNC: 6.7 G/DL (ref 6–8.2)
RBC # BLD AUTO: 4.13 M/UL (ref 3.93–5.22)
SODIUM SERPL-SCNC: 139 MEQ/L (ref 136–144)
WBC # BLD AUTO: 5.21 K/UL (ref 3.8–10.5)

## 2023-06-13 PROCEDURE — 36415 COLL VENOUS BLD VENIPUNCTURE: CPT

## 2023-06-13 PROCEDURE — G8754 DIAS BP LESS 90: HCPCS | Performed by: NURSE PRACTITIONER

## 2023-06-13 PROCEDURE — 80053 COMPREHEN METABOLIC PANEL: CPT

## 2023-06-13 PROCEDURE — 85027 COMPLETE CBC AUTOMATED: CPT

## 2023-06-13 PROCEDURE — 99214 OFFICE O/P EST MOD 30 MIN: CPT | Performed by: NURSE PRACTITIONER

## 2023-06-13 PROCEDURE — G8752 SYS BP LESS 140: HCPCS | Performed by: NURSE PRACTITIONER

## 2023-06-13 ASSESSMENT — ENCOUNTER SYMPTOMS
ARTHRALGIAS: 0
EYE DISCHARGE: 0
WEAKNESS: 0
DIARRHEA: 0
ABDOMINAL PAIN: 0
BACK PAIN: 0
TROUBLE SWALLOWING: 0
FREQUENCY: 0
RHINORRHEA: 0
SORE THROAT: 0
FATIGUE: 0
SHORTNESS OF BREATH: 0
EYE ITCHING: 0
NAUSEA: 0
COUGH: 0
NUMBNESS: 0
ABDOMINAL DISTENTION: 0
APPETITE CHANGE: 0
NECK STIFFNESS: 0
ACTIVITY CHANGE: 0
SLEEP DISTURBANCE: 0
BRUISES/BLEEDS EASILY: 0
SINUS PAIN: 0
NECK PAIN: 0
SINUS PRESSURE: 0
DECREASED CONCENTRATION: 0
HEADACHES: 0
MYALGIAS: 1
DIFFICULTY URINATING: 0
NERVOUS/ANXIOUS: 0
CONSTIPATION: 0
FEVER: 0

## 2023-06-13 NOTE — LETTER
Current Outpatient Medications:     albuterol HFA (VENTOLIN HFA) 90 mcg/actuation inhaler, Inhale 2 puffs every 6 (six) hours as needed for wheezing., Disp: , Rfl:     ARMOUR THYROID 60 mg tablet, TAKE 3/4 (0.75) TABLET ONCE A DAY, Disp: , Rfl:     budesonide-formoteroL (SYMBICORT) 160-4.5 mcg/actuation inhaler, Inhale 2 puffs 2 (two) times a day. Rinse mouth with water after use to reduce aftertaste and incidence of candidiasis. Do not swallow., Disp: 10.2 g, Rfl: 3    calcium carbonate/vitamin D3 (CALTRATE 600 + D ORAL), Take by mouth every evening.  , Disp: , Rfl:     hydrochlorothiazide (HYDRODIURIL) 12.5 mg tablet, TAKE 1 TABLET BY MOUTH EVERY DAY, Disp: 90 tablet, Rfl: 1    losartan (COZAAR) 100 mg tablet, TAKE 1 TABLET BY MOUTH EVERY DAY, Disp: 90 tablet, Rfl: 1    MAGNESIUM ORAL, Take 400 mg by mouth every evening.  , Disp: , Rfl:     montelukast (SINGULAIR) 10 mg tablet, TAKE 1 TABLET BY MOUTH EVERY DAY (Patient taking differently: Take 10 mg by mouth as needed.), Disp: 90 tablet, Rfl: 0    valACYclovir (VALTREX) 1 gram tablet, TAKE 1 TABLET BY MOUTH TWICE A DAY FOR 7 DAYS (Patient taking differently: as needed.), Disp: 30 tablet, Rfl: 0

## 2023-06-13 NOTE — PROGRESS NOTES
"      Subjective      Patient ID: Betina Malhotra is a 67 y.o. female.  1955      HPI  Right achiles rupture playing pickle ball. She is scheduled tomorrow for repair. She denies SOB, CP, palpitations, no exertional dizziness or TRAVIS. Prior to injury exercising without any concerns.     The following have been reviewed and updated as appropriate in this visit:        Review of Systems   Constitutional: Negative for activity change, appetite change, fatigue and fever.   HENT: Negative for congestion, ear pain, rhinorrhea, sinus pressure, sinus pain, sore throat and trouble swallowing.    Eyes: Negative for discharge, itching and visual disturbance.   Respiratory: Negative for cough and shortness of breath.    Cardiovascular: Negative for chest pain and leg swelling.   Gastrointestinal: Negative for abdominal distention, abdominal pain, constipation, diarrhea and nausea.   Endocrine: Negative for cold intolerance and heat intolerance.   Genitourinary: Negative for difficulty urinating, frequency and urgency.   Musculoskeletal: Positive for myalgias. Negative for arthralgias, back pain, neck pain and neck stiffness.   Skin: Negative for rash.   Allergic/Immunologic: Negative for environmental allergies.   Neurological: Negative for weakness, numbness and headaches.   Hematological: Does not bruise/bleed easily.   Psychiatric/Behavioral: Negative for decreased concentration and sleep disturbance. The patient is not nervous/anxious.        Objective     Vitals:    06/13/23 1207   BP: 138/74   Pulse: 68   Resp: 14   Temp: 36.9 °C (98.5 °F)   SpO2: 97%   Weight: 59 kg (130 lb)   Height: 1.588 m (5' 2.5\")     Body mass index is 23.4 kg/m².    Physical Exam  Vitals reviewed.   Constitutional:       Appearance: She is well-developed.   HENT:      Head: Normocephalic and atraumatic.      Right Ear: Tympanic membrane, ear canal and external ear normal.      Left Ear: Tympanic membrane, ear canal and external ear normal.      " Nose: Nose normal.   Eyes:      Conjunctiva/sclera: Conjunctivae normal.      Pupils: Pupils are equal, round, and reactive to light.   Cardiovascular:      Rate and Rhythm: Normal rate and regular rhythm.      Heart sounds: Normal heart sounds.   Pulmonary:      Effort: Pulmonary effort is normal.      Breath sounds: Normal breath sounds.   Abdominal:      General: Bowel sounds are normal.      Palpations: Abdomen is soft.   Musculoskeletal:         General: Signs of injury (RLE in immobilizer boot) present. Normal range of motion.   Skin:     General: Skin is warm and dry.   Neurological:      Mental Status: She is alert and oriented to person, place, and time.         Assessment/Plan   Diagnoses and all orders for this visit:    Preop examination (Primary)    Rupture of right Achilles tendon, subsequent encounter      Reviewed recent EKG completed with Cardiology, NSR. Weaver's Class I intraoperative risk. Form completed faxed to surgeons office and copy provided to patient. All questions and concerns addressed.    TERESA Arevalo

## 2023-06-16 ENCOUNTER — HOSPITAL ENCOUNTER (EMERGENCY)
Facility: HOSPITAL | Age: 68
Discharge: HOME | End: 2023-06-16
Attending: EMERGENCY MEDICINE
Payer: MEDICARE

## 2023-06-16 VITALS
HEART RATE: 59 BPM | SYSTOLIC BLOOD PRESSURE: 137 MMHG | OXYGEN SATURATION: 99 % | TEMPERATURE: 97.9 F | DIASTOLIC BLOOD PRESSURE: 73 MMHG | RESPIRATION RATE: 18 BRPM

## 2023-06-16 DIAGNOSIS — I10 HYPERTENSION, UNSPECIFIED TYPE: Primary | ICD-10-CM

## 2023-06-16 DIAGNOSIS — R51.9 NONINTRACTABLE HEADACHE, UNSPECIFIED CHRONICITY PATTERN, UNSPECIFIED HEADACHE TYPE: ICD-10-CM

## 2023-06-16 LAB
ALBUMIN SERPL-MCNC: 4.3 G/DL (ref 3.4–5)
ALP SERPL-CCNC: 38 IU/L (ref 35–126)
ALT SERPL-CCNC: 18 IU/L (ref 11–54)
ANION GAP SERPL CALC-SCNC: 6 MEQ/L (ref 3–15)
AST SERPL-CCNC: 27 IU/L (ref 15–41)
BASOPHILS # BLD: 0.03 K/UL (ref 0.01–0.1)
BASOPHILS NFR BLD: 0.5 %
BILIRUB SERPL-MCNC: 0.8 MG/DL (ref 0.3–1.2)
BILIRUB UR QL STRIP.AUTO: NEGATIVE MG/DL
BUN SERPL-MCNC: 24 MG/DL (ref 8–20)
CALCIUM SERPL-MCNC: 8.7 MG/DL (ref 8.9–10.3)
CHLORIDE SERPL-SCNC: 105 MEQ/L (ref 98–109)
CLARITY UR REFRACT.AUTO: CLEAR
CO2 SERPL-SCNC: 27 MEQ/L (ref 22–32)
COLOR UR AUTO: COLORLESS
CREAT SERPL-MCNC: 0.9 MG/DL (ref 0.6–1.1)
DIFFERENTIAL METHOD BLD: ABNORMAL
EOSINOPHIL # BLD: 0.19 K/UL (ref 0.04–0.36)
EOSINOPHIL NFR BLD: 3.4 %
ERYTHROCYTE [DISTWIDTH] IN BLOOD BY AUTOMATED COUNT: 13.5 % (ref 11.7–14.4)
GFR SERPL CREATININE-BSD FRML MDRD: >60 ML/MIN/1.73M*2
GLUCOSE SERPL-MCNC: 99 MG/DL (ref 70–99)
GLUCOSE UR STRIP.AUTO-MCNC: NEGATIVE MG/DL
HCT VFR BLDCO AUTO: 35.6 % (ref 35–45)
HGB BLD-MCNC: 11.5 G/DL (ref 11.8–15.7)
HGB UR QL STRIP.AUTO: NEGATIVE
IMM GRANULOCYTES # BLD AUTO: 0.02 K/UL (ref 0–0.08)
IMM GRANULOCYTES NFR BLD AUTO: 0.4 %
KETONES UR STRIP.AUTO-MCNC: NEGATIVE MG/DL
LEUKOCYTE ESTERASE UR QL STRIP.AUTO: NEGATIVE
LYMPHOCYTES # BLD: 2.25 K/UL (ref 1.2–3.5)
LYMPHOCYTES NFR BLD: 40.7 %
MCH RBC QN AUTO: 30 PG (ref 28–33.2)
MCHC RBC AUTO-ENTMCNC: 32.3 G/DL (ref 32.2–35.5)
MCV RBC AUTO: 93 FL (ref 83–98)
MONOCYTES # BLD: 0.39 K/UL (ref 0.28–0.8)
MONOCYTES NFR BLD: 7.1 %
NEUTROPHILS # BLD: 2.65 K/UL (ref 1.7–7)
NEUTS SEG NFR BLD: 47.9 %
NITRITE UR QL STRIP.AUTO: NEGATIVE
NRBC BLD-RTO: 0 %
PDW BLD AUTO: 10.7 FL (ref 9.4–12.3)
PH UR STRIP.AUTO: 6.5 [PH]
PLATELET # BLD AUTO: 222 K/UL (ref 150–369)
POTASSIUM SERPL-SCNC: 3.5 MEQ/L (ref 3.6–5.1)
PROT SERPL-MCNC: 6.8 G/DL (ref 6–8.2)
PROT UR QL STRIP.AUTO: NEGATIVE
RBC # BLD AUTO: 3.83 M/UL (ref 3.93–5.22)
SODIUM SERPL-SCNC: 138 MEQ/L (ref 136–144)
SP GR UR REFRACT.AUTO: 1.01
UROBILINOGEN UR STRIP-ACNC: 0.2 EU/DL
WBC # BLD AUTO: 5.53 K/UL (ref 3.8–10.5)

## 2023-06-16 PROCEDURE — 80053 COMPREHEN METABOLIC PANEL: CPT | Performed by: EMERGENCY MEDICINE

## 2023-06-16 PROCEDURE — 3E0337Z INTRODUCTION OF ELECTROLYTIC AND WATER BALANCE SUBSTANCE INTO PERIPHERAL VEIN, PERCUTANEOUS APPROACH: ICD-10-PCS | Performed by: EMERGENCY MEDICINE

## 2023-06-16 PROCEDURE — 81003 URINALYSIS AUTO W/O SCOPE: CPT | Performed by: EMERGENCY MEDICINE

## 2023-06-16 PROCEDURE — 85025 COMPLETE CBC W/AUTO DIFF WBC: CPT | Performed by: EMERGENCY MEDICINE

## 2023-06-16 PROCEDURE — 3E030RZ INTRODUCTION OF ANTIARRHYTHMIC INTO PERIPHERAL VEIN, OPEN APPROACH: ICD-10-PCS | Performed by: EMERGENCY MEDICINE

## 2023-06-16 PROCEDURE — 93005 ELECTROCARDIOGRAM TRACING: CPT | Performed by: EMERGENCY MEDICINE

## 2023-06-16 PROCEDURE — 63600000 HC DRUGS/DETAIL CODE: Performed by: EMERGENCY MEDICINE

## 2023-06-16 PROCEDURE — 96361 HYDRATE IV INFUSION ADD-ON: CPT

## 2023-06-16 PROCEDURE — 96375 TX/PRO/DX INJ NEW DRUG ADDON: CPT

## 2023-06-16 PROCEDURE — 36415 COLL VENOUS BLD VENIPUNCTURE: CPT

## 2023-06-16 PROCEDURE — 25800000 HC PHARMACY IV SOLUTIONS: Performed by: EMERGENCY MEDICINE

## 2023-06-16 PROCEDURE — 3E0333Z INTRODUCTION OF ANTI-INFLAMMATORY INTO PERIPHERAL VEIN, PERCUTANEOUS APPROACH: ICD-10-PCS | Performed by: EMERGENCY MEDICINE

## 2023-06-16 PROCEDURE — 93005 ELECTROCARDIOGRAM TRACING: CPT

## 2023-06-16 PROCEDURE — 63700000 HC SELF-ADMINISTRABLE DRUG: Performed by: EMERGENCY MEDICINE

## 2023-06-16 PROCEDURE — 99284 EMERGENCY DEPT VISIT MOD MDM: CPT | Mod: 25

## 2023-06-16 PROCEDURE — 96374 THER/PROPH/DIAG INJ IV PUSH: CPT

## 2023-06-16 PROCEDURE — 85025 COMPLETE CBC W/AUTO DIFF WBC: CPT

## 2023-06-16 PROCEDURE — 80053 COMPREHEN METABOLIC PANEL: CPT

## 2023-06-16 RX ORDER — AMLODIPINE BESYLATE 2.5 MG/1
2.5 TABLET ORAL DAILY
Qty: 90 TABLET | Refills: 0 | Status: SHIPPED | OUTPATIENT
Start: 2023-06-16 | End: 2023-06-16 | Stop reason: SDUPTHER

## 2023-06-16 RX ORDER — BUTALBITAL, ACETAMINOPHEN AND CAFFEINE 50; 325; 40 MG/1; MG/1; MG/1
1 TABLET ORAL ONCE
Status: COMPLETED | OUTPATIENT
Start: 2023-06-16 | End: 2023-06-16

## 2023-06-16 RX ORDER — AMLODIPINE BESYLATE 2.5 MG/1
2.5 TABLET ORAL DAILY
Qty: 30 TABLET | Refills: 0 | Status: SHIPPED | OUTPATIENT
Start: 2023-06-16 | End: 2023-06-22 | Stop reason: SDUPTHER

## 2023-06-16 RX ORDER — HYDRALAZINE HYDROCHLORIDE 20 MG/ML
10 INJECTION INTRAMUSCULAR; INTRAVENOUS ONCE
Status: COMPLETED | OUTPATIENT
Start: 2023-06-16 | End: 2023-06-16

## 2023-06-16 RX ORDER — KETOROLAC TROMETHAMINE 15 MG/ML
15 INJECTION, SOLUTION INTRAMUSCULAR; INTRAVENOUS ONCE
Status: COMPLETED | OUTPATIENT
Start: 2023-06-16 | End: 2023-06-16

## 2023-06-16 RX ADMIN — SODIUM CHLORIDE 1000 ML: 9 INJECTION, SOLUTION INTRAVENOUS at 19:30

## 2023-06-16 RX ADMIN — BUTALBITAL, ACETAMINOPHEN, AND CAFFEINE 1 TABLET: 325; 50; 40 TABLET ORAL at 19:30

## 2023-06-16 RX ADMIN — HYDRALAZINE HYDROCHLORIDE 10 MG: 20 INJECTION INTRAMUSCULAR; INTRAVENOUS at 21:17

## 2023-06-16 RX ADMIN — KETOROLAC TROMETHAMINE 15 MG: 15 INJECTION, SOLUTION INTRAMUSCULAR; INTRAVENOUS at 19:30

## 2023-06-16 ASSESSMENT — ENCOUNTER SYMPTOMS
VOMITING: 0
COLOR CHANGE: 0
SHORTNESS OF BREATH: 0
CONFUSION: 0
HEADACHES: 1
WEAKNESS: 0
COUGH: 0
FREQUENCY: 1
DIZZINESS: 1
DIARRHEA: 0
NUMBNESS: 0
FEVER: 0
ABDOMINAL PAIN: 0

## 2023-06-16 NOTE — ED PROVIDER NOTES
Emergency Medicine Note  HPI   HISTORY OF PRESENT ILLNESS     67-year-old female with history of hypertension presents with headache, nausea and brain fog.  Patient is status post Achilles tendon repair on Wednesday and had felt fine up until yesterday afternoon.  She started to develop dizziness, frontal headache.  Patient has been taking oxycodone, Toradol for her pain as well as doxycycline.  She does endorse not drinking as much since the surgery.  Endorses tension along her upper back.  No vision changes.            Patient History   PAST HISTORY     Reviewed from Nursing Triage:       Past Medical History:   Diagnosis Date   • Anemia    • Arthritis    • Asthma    • Hypertension    • Hypothyroidism    • Murmur    • PONV (postoperative nausea and vomiting)    • Seasonal allergies        Past Surgical History:   Procedure Laterality Date   • BUNIONECTOMY Left    • COLONOSCOPY     • KNEE ARTHROSCOPY Left    • TONSILLECTOMY         Family History   Problem Relation Age of Onset   • Hypertension Biological Mother    • Pancreatic cancer Biological Mother          at age 80   • Pancreatic cancer Biological Father          at age 52   • Heart disease Biological Brother         hx of stents in his 50s   • Hypertension Biological Brother    • No Known Problems Biological Brother    • Hypertension Biological Brother        Social History     Tobacco Use   • Smoking status: Never   • Smokeless tobacco: Never   Vaping Use   • Vaping Use: Never used   Substance Use Topics   • Alcohol use: Yes     Comment: occasionally   • Drug use: No         Review of Systems   REVIEW OF SYSTEMS     Review of Systems   Constitutional: Negative for fever.   Respiratory: Negative for cough and shortness of breath.    Cardiovascular: Negative for chest pain.   Gastrointestinal: Negative for abdominal pain, diarrhea and vomiting.   Genitourinary: Positive for frequency.   Skin: Negative for color change and rash.   Neurological:  Positive for dizziness and headaches. Negative for syncope, weakness and numbness.   Psychiatric/Behavioral: Negative for confusion.         VITALS     ED Vitals    Date/Time Temp Pulse Resp BP SpO2 [facility]   06/16/23 2144 -- -- 18 137/73 99 % [facility]   06/16/23 2117 -- 59 -- 177/76 -- [facility]   06/16/23 2053 -- 54 18 182/96 98 % [facility]   06/16/23 1922 -- 58 18 183/109 100 % [facility]   06/16/23 1432 36.6 °C (97.9 °F) 67 -- 183/94 100 % [facility]        Pulse Ox %: 100 % (06/16/23 1920)  Pulse Ox Interpretation: Normal (06/16/23 1920)  Heart Rate: 67 (06/16/23 1920)  Rhythm Strip Interpretation: Normal Sinus Rhythm (06/16/23 1920)     Physical Exam   PHYSICAL EXAM     Physical Exam  Vitals and nursing note reviewed.   Constitutional:       General: She is not in acute distress.     Appearance: She is well-developed.   HENT:      Head: Normocephalic and atraumatic.   Eyes:      Extraocular Movements: Extraocular movements intact.      Conjunctiva/sclera: Conjunctivae normal.      Pupils: Pupils are equal, round, and reactive to light.   Cardiovascular:      Rate and Rhythm: Normal rate.   Pulmonary:      Effort: Pulmonary effort is normal.   Abdominal:      General: Abdomen is flat.      Palpations: Abdomen is soft.      Tenderness: There is no abdominal tenderness.   Musculoskeletal:         General: No deformity.      Cervical back: Normal range of motion and neck supple. No tenderness.   Skin:     General: Skin is warm and dry.   Neurological:      General: No focal deficit present.      Mental Status: She is alert and oriented to person, place, and time. Mental status is at baseline.   Psychiatric:         Behavior: Behavior normal.           PROCEDURES     Procedures     DATA     Results     Procedure Component Value Units Date/Time    UA with reflex culture [[id number]]  (Normal) Collected: 06/16/23 2115    Specimen: Urine, Clean Catch Updated: 06/16/23 2147    Narrative:      The following orders were created for panel order UA with  reflex culture.  Procedure                               Abnormality         Status                     ---------                               -----------         ------                     UA Reflex to Culture (Ma...[[id number]]  Normal              Final result                 Please view results for these tests on the individual orders.    UA Reflex to Culture (Macroscopic) [[id number]]  (Normal) Collected: 06/16/23 2115    Specimen: Urine, Clean Catch Updated: 06/16/23 2147     Color, Urine Colorless     Clarity, Urine Clear     Specific Gravity, Urine 1.010     pH, Urine 6.5     Leukocyte Esterase Negative     Comment: Results can be falsely negative due to high specific gravity, some antibiotics, glucose >3 g/dl, or WBC other than neutrophils.        Nitrite, Urine Negative     Protein, Urine Negative     Glucose, Urine Negative mg/dL      Ketones, Urine Negative mg/dL      Urobilinogen, Urine 0.2 EU/dL      Bilirubin, Urine Negative mg/dL      Blood, Urine Negative     Comment: The sensitivity of the occult blood test is equivalent to approximately 4 intact RBC/HPF.       Comprehensive metabolic panel [[id number]]  (Abnormal) Collected: 06/16/23 1439    Specimen: Blood, Venous Updated: 06/16/23 1516     Sodium 138 mEQ/L      Potassium 3.5 mEQ/L      Comment: Results obtained on plasma. Plasma Potassium values may be up to 0.4 mEQ/L less than serum values. The differences may be greater for patients with high platelet or white cell counts.        Chloride 105 mEQ/L      CO2 27 mEQ/L      BUN 24 mg/dL      Creatinine 0.9 mg/dL      Glucose 99 mg/dL      Calcium 8.7 mg/dL      AST (SGOT) 27 IU/L      ALT (SGPT) 18 IU/L      Alkaline Phosphatase 38 IU/L      Total Protein 6.8 g/dL      Comment: Test performed on plasma which typically contains approximately 0.4 g/dL more protein than serum.        Albumin 4.3 g/dL      Bilirubin, Total 0.8 mg/dL      eGFR >60.0 mL/min/1.73m*2      Anion Gap 6 mEQ/L     CBC and  differential [[id number]]  (Abnormal) Collected: 06/16/23 1439    Specimen: Blood, Venous Updated: 06/16/23 1450     WBC 5.53 K/uL      RBC 3.83 M/uL      Hemoglobin 11.5 g/dL      Hematocrit 35.6 %      MCV 93.0 fL      MCH 30.0 pg      MCHC 32.3 g/dL      RDW 13.5 %      Platelets 222 K/uL      MPV 10.7 fL      Differential Type Auto     nRBC 0.0 %      Immature Granulocytes 0.4 %      Neutrophils 47.9 %      Lymphocytes 40.7 %      Monocytes 7.1 %      Eosinophils 3.4 %      Basophils 0.5 %      Immature Granulocytes, Absolute 0.02 K/uL      Neutrophils, Absolute 2.65 K/uL      Lymphocytes, Absolute 2.25 K/uL      Monocytes, Absolute 0.39 K/uL      Eosinophils, Absolute 0.19 K/uL      Basophils, Absolute 0.03 K/uL           Imaging Results    None         ECG 12 lead   Independent Interpretation by ED Provider   My independent EKG interpretation shows NSR at 59 - no ST elevation or depression, no acute signs of ischemia.            Scoring tools                                  ED Course & MDM   MDM / ED COURSE / CLINICAL IMPRESSION / DISPO     MDM    ED Course as of 06/17/23 0114   Fri Jun 16, 2023 1953 Patient with complaints of headache, dizziness.  Vitals show elevated blood pressure 180s over 90s.  Patient has been compliant with her antihypertensives.  Suspect her symptoms may be multifactorial from recent surgery, dehydration, tension, diet.  Low suspicion for intracranial hemorrhage.  No focal deficits.  Will treat symptoms with IV fluids, Fioricet, Toradol.  If symptoms persist, continues to be hypertensive, will consider imaging. [BL]   Sat Jun 17, 2023   0112 Patient symptoms improved after IV medications, Toradol, IV fluids and Versed.  However blood pressure remained elevated 180s over 100s.  She was given hydralazine with improvement in her numbers.  Recent use of Toradol may be contributing to her pressure.  Instructed to continue with blood pressure monitoring at home.  Will prescribe a low-dose  course of amlodipine to take daily as needed with set parameters.  Instructed to follow-up with PCP or cardiology for BP check and management. [BL]      ED Course User Index  [BL] Jose Juan Pereira MD     Clinical Impression      Hypertension, unspecified type   Nonintractable headache, unspecified chronicity pattern, unspecified headache type     _________________     ED Disposition   Discharge                   Jose Juan Pereira MD  06/17/23 0114

## 2023-06-17 LAB
ATRIAL RATE: 59
P AXIS: 21
PR INTERVAL: 180
QRS DURATION: 84
QT INTERVAL: 428
QTC CALCULATION(BAZETT): 423
R AXIS: 26
T WAVE AXIS: 60
VENTRICULAR RATE: 59

## 2023-06-17 NOTE — DISCHARGE INSTRUCTIONS
You were found to have an elevated blood pressure on your visit today.    Please monitor your blood pressure closely - at least once a day.    You were started on an additional blood pressure medication called amlodipine 2.5 mg.  If your systolic pressure is >160 or diastolic is greater than 100, take the medication.    Please call to make a follow up with your primary care doctor or cardiologist for BP check and management.

## 2023-06-19 ENCOUNTER — TELEPHONE (OUTPATIENT)
Dept: CARDIOLOGY | Facility: CLINIC | Age: 68
End: 2023-06-19
Payer: MEDICARE

## 2023-06-19 NOTE — TELEPHONE ENCOUNTER
Dr. Veronica, I spoke with Mrs. Gary she stated she was in the ER on Friday 6/16/2023. The ER doctor discharged her with Amlodipine 2.5 mg. On Saturday 6/17/2023, she stated she called the doctor on call who instructed her to take Amlodipine 10 mg.Patient stated she took the Amlodipine  10 mg on Sunday. Her headache and BP are better. She wanted to know if she should still take  the Amlodipine or just stay on the Losartan 100 mg. Today her BP was 135/90 without the Amlodipine. Please advise. Patient can be reached @ [phone].Patient has made an appt for 6/22/202. NESHA

## 2023-06-21 NOTE — PROGRESS NOTES
Cardiology  Office Progress Note         Reason for visit:   Chief Complaint   Patient presents with   • Follow-up       HPI     Betina Malhotra is a 67 y.o. female who presents to the office for cardiovascular follow up and management of hypertension and mitral insufficiency.     She was last seen in the office on 3/29/23. Echo showed only mild mitral insufficieny and a very slight dilatation of the patient's ascending thoracic aorta at 3.8 cm. Her home BP was running between 115/77 - 146/84. She denied any cardiac complaints. I made no changes to her medical regimen and asked her to follow up in 1 year. I planned to repeat Echo in 2-3 years.     Pt was seen at  ED on 6/16/23 for hypertension associated with headache, nausea and brain fog. She was s/p achilles tendon repair 2 days prior. BP ranging 177/76 - 183/109. She was treated with IVF, Toradol, Fiorcet and Hydralazine 10 mg. Pt was prescribed Amlodipine 2.5 mg to take daily as needed with set parameters. She was advised to follow up with PCP or cardiology.     Patient called on 6/19/23. She had called Saturday and was advised to take 10 mg of Amlodipine. Her BP was now 135/90 and she was wondering how much Amlodipine she should take. I advised her to take 5 mg of Amlodipine.    Today, she is accompanied to the visit by her . She reports tolerating her medications without any adverse reactions. She has not taken any Amlodipine today. Her BP was 136/91 this morning. Over the last week it has ranged 130-140/90s. She continues to have an occasional slight headache.     She completed oxycodone on Saturday. She is wondering if she can take Ketorlac PRN for pain. She has a follow up scheduled with her surgeon next Thursday. She currently has a right leg cast and uses a scooter to get around.     She denies any cardiac complaints.    Past Medical History:   Diagnosis Date   • Anemia    • Arthritis    • Asthma    • Hypertension    • Hypothyroidism    •  Murmur    • PONV (postoperative nausea and vomiting)    • Seasonal allergies        Past Surgical History:   Procedure Laterality Date   • BUNIONECTOMY Left    • COLONOSCOPY     • KNEE ARTHROSCOPY Left    • TONSILLECTOMY         Social History     Tobacco Use   • Smoking status: Never   • Smokeless tobacco: Never   Vaping Use   • Vaping Use: Never used   Substance Use Topics   • Alcohol use: Yes     Comment: occasionally   • Drug use: No       Family History   Problem Relation Age of Onset   • Hypertension Biological Mother    • Pancreatic cancer Biological Mother          at age 80   • Pancreatic cancer Biological Father          at age 52   • Heart disease Biological Brother         hx of stents in his 50s   • Hypertension Biological Brother    • No Known Problems Biological Brother    • Hypertension Biological Brother        Allergies:  Dog dander    Current Outpatient Medications   Medication Sig Dispense Refill   • albuterol HFA (VENTOLIN HFA) 90 mcg/actuation inhaler Inhale 2 puffs every 6 (six) hours as needed for wheezing.     • amLODIPine (NORVASC) 5 mg tablet Take 1 tablet (5 mg total) by mouth daily. 90 tablet 3   • ARMOUR THYROID 60 mg tablet TAKE 3/4 (0.75) TABLET ONCE A DAY     • budesonide-formoteroL (SYMBICORT) 160-4.5 mcg/actuation inhaler Inhale 2 puffs 2 (two) times a day. Rinse mouth with water after use to reduce aftertaste and incidence of candidiasis. Do not swallow. 10.2 g 3   • calcium carbonate/vitamin D3 (CALTRATE 600 + D ORAL) Take by mouth every evening.       • hydrochlorothiazide (HYDRODIURIL) 12.5 mg tablet Take 2 tablets (25 mg total) by mouth daily. 90 tablet 1   • losartan (COZAAR) 100 mg tablet TAKE 1 TABLET BY MOUTH EVERY DAY 90 tablet 1   • MAGNESIUM ORAL Take 400 mg by mouth every evening.       • montelukast (SINGULAIR) 10 mg tablet TAKE 1 TABLET BY MOUTH EVERY DAY 90 tablet 0   • valACYclovir (VALTREX) 1 gram tablet TAKE 1 TABLET BY MOUTH TWICE A DAY FOR 7 DAYS  (Patient taking differently: as needed.) 30 tablet 0     No current facility-administered medications for this visit.       Review of Systems   Cardiovascular: Negative for chest pain, dyspnea on exertion, near-syncope, palpitations and syncope.   Neurological: Positive for headaches (occasional - slight). Negative for dizziness and light-headedness.       Objective     Vitals:    06/22/23 1051   BP: 132/76   Pulse: 89   SpO2: 96%       Wt Readings from Last 5 Encounters:   06/13/23 59 kg (130 lb)   03/29/23 61 kg (134 lb 7.7 oz)   03/29/23 60.8 kg (134 lb)   02/22/23 62 kg (136 lb 9.6 oz)   11/02/22 59 kg (130 lb)       Body mass index is 23.78 kg/m².    Physical Exam  Vitals and nursing note reviewed.   Constitutional:       General: She is not in acute distress.     Appearance: Normal appearance.   HENT:      Head: Normocephalic.   Eyes:      Extraocular Movements: Extraocular movements intact.   Cardiovascular:      Rate and Rhythm: Regular rhythm. Bradycardia present.      Pulses: Normal pulses.      Heart sounds: Normal heart sounds.   Pulmonary:      Effort: Pulmonary effort is normal. No respiratory distress.      Breath sounds: Normal breath sounds. No wheezing or rales.   Abdominal:      General: There is no distension.      Palpations: Abdomen is soft.   Musculoskeletal:         General: Normal range of motion.      Cervical back: Normal range of motion.      Right lower leg: No edema.      Left lower leg: No edema.      Comments: Right leg cast   Skin:     General: Skin is warm and dry.      Findings: No rash.   Neurological:      General: No focal deficit present.      Mental Status: She is alert and oriented to person, place, and time.   Psychiatric:         Mood and Affect: Mood normal.         Behavior: Behavior normal.         Thought Content: Thought content normal.         Judgment: Judgment normal.          ECG: Sinus rhythm, borderline ST segments    Hematology  Lab Results   Component Value  Date    WBC 5.53 06/16/2023    HGB 11.5 (L) 06/16/2023    HCT 35.6 06/16/2023     06/16/2023       Chemistries  Lab Results   Component Value Date     06/16/2023    K 3.5 (L) 06/16/2023     06/16/2023    CREATININE 0.9 06/16/2023    BUN 24 (H) 06/16/2023    CO2 27 06/16/2023    GLUCOSE 99 06/16/2023    CALCIUM 8.7 (L) 06/16/2023    ALT 18 06/16/2023    AST 27 06/16/2023       Cholesterol  Lab Results   Component Value Date    CHOL 215 (H) 05/19/2022    TRIG 54 05/19/2022    HDL 95 05/19/2022    LDLCALC 110 (H) 05/19/2022       Endocrine  Lab Results   Component Value Date    TSH 0.700 05/19/2022       Cardiac Imaging    TRANSTHORACIC ECHO (TTE) COMPLETE 03/29/2023    Interpretation Summary  •  Left Ventricle: Normal ventricle size. Normal wall thickness. Normal systolic function. Estimated EF 60-65%. Wall motion appears grossly normal. Normal diastolic filling pattern for age.  •  Right Ventricle: Normal ventricle size. Normal systolic function.  •  Left Atrium: Normal sized atrium.  •  Right Atrium: Normal sized atrium.  •  Aortic Valve: Tricuspid valve.  Sclerotic leaflets. No regurgitation. No stenosis. Calculated dimensionless index = 0.89.  •  Mitral Valve: Normal leaflet structure. Normal leaflet motion. Mild regurgitation. No stenosis.  •  Tricuspid Valve: Structure is grossly normal. Mild regurgitation. Estimated RVSP = 32 mmHg. No significant stenosis.  •  Pulmonic Valve: Normal structure. Trace regurgitation. No stenosis.  •  Aorta: Aortic root grossly normal. Sinuses of Valsalva grossly normal. Ascending aorta grossly normal. Descending aorta grossly normal. Mild dilatation of the ascending aorta.  •  IVC/SVC: Inferior vena cava is dilated (>2.1cm). Inferior vena cava demonstrates normal respiratory collapse.  •  Pericardium: No evidence of pericardial effusion.                Assessment/Plan     Primary hypertension  The patient's blood pressure is fairly well controlled at this point.   I will make some minor medication adjustments in order to have the patient reach our goal.  She will increase her hydrochlorothiazide to 25 mg a day and she will continue amlodipine at 5 mg a day.  She will return to the office in 4 weeks at which time we can reassess her.  She will monitor her blood pressure at home and will take extra amlodipine tablets if her pressure is elevated.  We can then decide if a higher dose of amlodipine is appropriate for her.  She will contact me with any problems or questions prior to her upcoming visit.    Murmur  The patient has only mild mitral insufficiency but no significant valvular lesions.  We will follow her clinically.      New Medications Ordered This Visit   Medications   • amLODIPine (NORVASC) 5 mg tablet     Sig: Take 1 tablet (5 mg total) by mouth daily.     Dispense:  90 tablet     Refill:  3   • hydrochlorothiazide (HYDRODIURIL) 12.5 mg tablet     Sig: Take 2 tablets (25 mg total) by mouth daily.     Dispense:  90 tablet     Refill:  1       Medications Discontinued During This Encounter   Medication Reason   • amLODIPine (NORVASC) 2.5 mg tablet Reorder   • hydrochlorothiazide (HYDRODIURIL) 12.5 mg tablet Reorder       Orders Placed This Encounter   Procedures   • [facility] MUSE ECG 12 lead (clinic performed)     Scheduling Instructions:      PLEASE USE THIS ORDER FOR ECG'S PERFORMED IN PHYSICIAN OFFICES     Order Specific Question:   Release to patient     Answer:   Immediate       Follow Up Plans:  Return in about 4 weeks (around 7/20/2023) for Recheck.          ICitlali, am scribing for, and in the presence of, Etseban Veronica MD.    IEsteban MD, personally performed the services described in this documentation as scribed by Citlali Castellon in my presence, and it is both accurate and complete.       Esteban Veronica MD  6/22/2023

## 2023-06-22 ENCOUNTER — OFFICE VISIT (OUTPATIENT)
Dept: CARDIOLOGY | Facility: CLINIC | Age: 68
End: 2023-06-22
Payer: MEDICARE

## 2023-06-22 VITALS
SYSTOLIC BLOOD PRESSURE: 132 MMHG | OXYGEN SATURATION: 96 % | HEART RATE: 89 BPM | HEIGHT: 62 IN | DIASTOLIC BLOOD PRESSURE: 76 MMHG | BODY MASS INDEX: 23.78 KG/M2

## 2023-06-22 DIAGNOSIS — I10 PRIMARY HYPERTENSION: Primary | ICD-10-CM

## 2023-06-22 DIAGNOSIS — E03.9 ACQUIRED HYPOTHYROIDISM: ICD-10-CM

## 2023-06-22 DIAGNOSIS — R01.1 MURMUR: ICD-10-CM

## 2023-06-22 LAB
ATRIAL RATE: 59
P AXIS: 53
PR INTERVAL: 198
QRS DURATION: 86
QT INTERVAL: 422
QTC CALCULATION(BAZETT): 417
R AXIS: 34
T WAVE AXIS: 79
VENTRICULAR RATE: 59

## 2023-06-22 PROCEDURE — G8752 SYS BP LESS 140: HCPCS | Performed by: INTERNAL MEDICINE

## 2023-06-22 PROCEDURE — 93000 ELECTROCARDIOGRAM COMPLETE: CPT | Performed by: INTERNAL MEDICINE

## 2023-06-22 PROCEDURE — 99214 OFFICE O/P EST MOD 30 MIN: CPT | Performed by: INTERNAL MEDICINE

## 2023-06-22 PROCEDURE — G8754 DIAS BP LESS 90: HCPCS | Performed by: INTERNAL MEDICINE

## 2023-06-22 RX ORDER — HYDROCHLOROTHIAZIDE 12.5 MG/1
25 TABLET ORAL DAILY
Qty: 90 TABLET | Refills: 1
Start: 2023-06-22 | End: 2023-07-25 | Stop reason: SDUPTHER

## 2023-06-22 RX ORDER — AMLODIPINE BESYLATE 5 MG/1
5 TABLET ORAL DAILY
Qty: 90 TABLET | Refills: 3 | Status: SHIPPED | OUTPATIENT
Start: 2023-06-22 | End: 2024-08-28 | Stop reason: SDUPTHER

## 2023-06-22 ASSESSMENT — ENCOUNTER SYMPTOMS
NEAR-SYNCOPE: 0
SYNCOPE: 0
DIZZINESS: 0
HEADACHES: 1
PALPITATIONS: 0
DYSPNEA ON EXERTION: 0
LIGHT-HEADEDNESS: 0

## 2023-06-22 NOTE — LETTER
June 22, 2023     TERESA Torres  [address] [facility]  [address]  [place] [address]    Patient: Betina Malhotra  YOB: 1955  Date of Visit: 6/22/2023      Dear Dr. Paz:    Thank you for referring Betina Malhotra to me for evaluation. Below are my notes for this consultation.    If you have questions, please do not hesitate to call me. I look forward to following your patient along with you.         Sincerely,        Esteban Veronica MD        CC: No Recipients    Esteban Veronica MD  6/22/2023 11:46 AM  Signed       Cardiology  Office Progress Note         Reason for visit:   Chief Complaint   Patient presents with   • Follow-up       HPI     Betina Malhotra is a 67 y.o. female who presents to the office for cardiovascular follow up and management of hypertension and mitral insufficiency.     She was last seen in the office on 3/29/23. Echo showed only mild mitral insufficieny and a very slight dilatation of the patient's ascending thoracic aorta at 3.8 cm. Her home BP was running between 115/77 - 146/84. She denied any cardiac complaints. I made no changes to her medical regimen and asked her to follow up in 1 year. I planned to repeat Echo in 2-3 years.     Pt was seen at  ED on 6/16/23 for hypertension associated with headache, nausea and brain fog. She was s/p achilles tendon repair 2 days prior. BP ranging 177/76 - 183/109. She was treated with IVF, Toradol, Fiorcet and Hydralazine 10 mg. Pt was prescribed Amlodipine 2.5 mg to take daily as needed with set parameters. She was advised to follow up with PCP or cardiology.     Patient called on 6/19/23. She had called Saturday and was advised to take 10 mg of Amlodipine. Her BP was now 135/90 and she was wondering how much Amlodipine she should take. I advised her to take 5 mg of Amlodipine.    Today, she is accompanied to the visit by her . She reports tolerating her medications without any adverse reactions. She has not  taken any Amlodipine today. Her BP was 136/91 this morning. Over the last week it has ranged 130-140/90s. She continues to have an occasional slight headache.     She completed oxycodone on Saturday. She is wondering if she can take Ketorlac PRN for pain. She has a follow up scheduled with her surgeon next Thursday. She currently has a right leg cast and uses a scooter to get around.     She denies any cardiac complaints.    Past Medical History:   Diagnosis Date   • Anemia    • Arthritis    • Asthma    • Hypertension    • Hypothyroidism    • Murmur    • PONV (postoperative nausea and vomiting)    • Seasonal allergies        Past Surgical History:   Procedure Laterality Date   • BUNIONECTOMY Left    • COLONOSCOPY     • KNEE ARTHROSCOPY Left    • TONSILLECTOMY         Social History     Tobacco Use   • Smoking status: Never   • Smokeless tobacco: Never   Vaping Use   • Vaping Use: Never used   Substance Use Topics   • Alcohol use: Yes     Comment: occasionally   • Drug use: No       Family History   Problem Relation Age of Onset   • Hypertension Biological Mother    • Pancreatic cancer Biological Mother          at age 80   • Pancreatic cancer Biological Father          at age 52   • Heart disease Biological Brother         hx of stents in his 50s   • Hypertension Biological Brother    • No Known Problems Biological Brother    • Hypertension Biological Brother        Allergies:  Dog dander    Current Outpatient Medications   Medication Sig Dispense Refill   • albuterol HFA (VENTOLIN HFA) 90 mcg/actuation inhaler Inhale 2 puffs every 6 (six) hours as needed for wheezing.     • amLODIPine (NORVASC) 5 mg tablet Take 1 tablet (5 mg total) by mouth daily. 90 tablet 3   • ARMOUR THYROID 60 mg tablet TAKE 3/4 (0.75) TABLET ONCE A DAY     • budesonide-formoteroL (SYMBICORT) 160-4.5 mcg/actuation inhaler Inhale 2 puffs 2 (two) times a day. Rinse mouth with water after use to reduce aftertaste and incidence of  candidiasis. Do not swallow. 10.2 g 3   • calcium carbonate/vitamin D3 (CALTRATE 600 + D ORAL) Take by mouth every evening.       • hydrochlorothiazide (HYDRODIURIL) 12.5 mg tablet Take 2 tablets (25 mg total) by mouth daily. 90 tablet 1   • losartan (COZAAR) 100 mg tablet TAKE 1 TABLET BY MOUTH EVERY DAY 90 tablet 1   • MAGNESIUM ORAL Take 400 mg by mouth every evening.       • montelukast (SINGULAIR) 10 mg tablet TAKE 1 TABLET BY MOUTH EVERY DAY 90 tablet 0   • valACYclovir (VALTREX) 1 gram tablet TAKE 1 TABLET BY MOUTH TWICE A DAY FOR 7 DAYS (Patient taking differently: as needed.) 30 tablet 0     No current facility-administered medications for this visit.       Review of Systems   Cardiovascular: Negative for chest pain, dyspnea on exertion, near-syncope, palpitations and syncope.   Neurological: Positive for headaches (occasional - slight). Negative for dizziness and light-headedness.       Objective     Vitals:    06/22/23 1051   BP: 132/76   Pulse: 89   SpO2: 96%       Wt Readings from Last 5 Encounters:   06/13/23 59 kg (130 lb)   03/29/23 61 kg (134 lb 7.7 oz)   03/29/23 60.8 kg (134 lb)   02/22/23 62 kg (136 lb 9.6 oz)   11/02/22 59 kg (130 lb)       Body mass index is 23.78 kg/m².    Physical Exam  Vitals and nursing note reviewed.   Constitutional:       General: She is not in acute distress.     Appearance: Normal appearance.   HENT:      Head: Normocephalic.   Eyes:      Extraocular Movements: Extraocular movements intact.   Cardiovascular:      Rate and Rhythm: Regular rhythm. Bradycardia present.      Pulses: Normal pulses.      Heart sounds: Normal heart sounds.   Pulmonary:      Effort: Pulmonary effort is normal. No respiratory distress.      Breath sounds: Normal breath sounds. No wheezing or rales.   Abdominal:      General: There is no distension.      Palpations: Abdomen is soft.   Musculoskeletal:         General: Normal range of motion.      Cervical back: Normal range of motion.      Right  lower leg: No edema.      Left lower leg: No edema.      Comments: Right leg cast   Skin:     General: Skin is warm and dry.      Findings: No rash.   Neurological:      General: No focal deficit present.      Mental Status: She is alert and oriented to person, place, and time.   Psychiatric:         Mood and Affect: Mood normal.         Behavior: Behavior normal.         Thought Content: Thought content normal.         Judgment: Judgment normal.          ECG: Sinus rhythm, borderline ST segments    Hematology  Lab Results   Component Value Date    WBC 5.53 06/16/2023    HGB 11.5 (L) 06/16/2023    HCT 35.6 06/16/2023     06/16/2023       Chemistries  Lab Results   Component Value Date     06/16/2023    K 3.5 (L) 06/16/2023     06/16/2023    CREATININE 0.9 06/16/2023    BUN 24 (H) 06/16/2023    CO2 27 06/16/2023    GLUCOSE 99 06/16/2023    CALCIUM 8.7 (L) 06/16/2023    ALT 18 06/16/2023    AST 27 06/16/2023       Cholesterol  Lab Results   Component Value Date    CHOL 215 (H) 05/19/2022    TRIG 54 05/19/2022    HDL 95 05/19/2022    LDLCALC 110 (H) 05/19/2022       Endocrine  Lab Results   Component Value Date    TSH 0.700 05/19/2022       Cardiac Imaging    TRANSTHORACIC ECHO (TTE) COMPLETE 03/29/2023    Interpretation Summary  •  Left Ventricle: Normal ventricle size. Normal wall thickness. Normal systolic function. Estimated EF 60-65%. Wall motion appears grossly normal. Normal diastolic filling pattern for age.  •  Right Ventricle: Normal ventricle size. Normal systolic function.  •  Left Atrium: Normal sized atrium.  •  Right Atrium: Normal sized atrium.  •  Aortic Valve: Tricuspid valve.  Sclerotic leaflets. No regurgitation. No stenosis. Calculated dimensionless index = 0.89.  •  Mitral Valve: Normal leaflet structure. Normal leaflet motion. Mild regurgitation. No stenosis.  •  Tricuspid Valve: Structure is grossly normal. Mild regurgitation. Estimated RVSP = 32 mmHg. No significant  stenosis.  •  Pulmonic Valve: Normal structure. Trace regurgitation. No stenosis.  •  Aorta: Aortic root grossly normal. Sinuses of Valsalva grossly normal. Ascending aorta grossly normal. Descending aorta grossly normal. Mild dilatation of the ascending aorta.  •  IVC/SVC: Inferior vena cava is dilated (>2.1cm). Inferior vena cava demonstrates normal respiratory collapse.  •  Pericardium: No evidence of pericardial effusion.                Assessment/Plan     Primary hypertension  The patient's blood pressure is fairly well controlled at this point.  I will make some minor medication adjustments in order to have the patient reach our goal.  She will increase her hydrochlorothiazide to 25 mg a day and she will continue amlodipine at 5 mg a day.  She will return to the office in 4 weeks at which time we can reassess her.  She will monitor her blood pressure at home and will take extra amlodipine tablets if her pressure is elevated.  We can then decide if a higher dose of amlodipine is appropriate for her.  She will contact me with any problems or questions prior to her upcoming visit.    Murmur  The patient has only mild mitral insufficiency but no significant valvular lesions.  We will follow her clinically.      New Medications Ordered This Visit   Medications   • amLODIPine (NORVASC) 5 mg tablet     Sig: Take 1 tablet (5 mg total) by mouth daily.     Dispense:  90 tablet     Refill:  3   • hydrochlorothiazide (HYDRODIURIL) 12.5 mg tablet     Sig: Take 2 tablets (25 mg total) by mouth daily.     Dispense:  90 tablet     Refill:  1       Medications Discontinued During This Encounter   Medication Reason   • amLODIPine (NORVASC) 2.5 mg tablet Reorder   • hydrochlorothiazide (HYDRODIURIL) 12.5 mg tablet Reorder       Orders Placed This Encounter   Procedures   • [facility] MUSE ECG 12 lead (clinic performed)     Scheduling Instructions:      PLEASE USE THIS ORDER FOR ECG'S PERFORMED IN PHYSICIAN OFFICES     Order  Specific Question:   Release to patient     Answer:   Immediate       Follow Up Plans:  Return in about 4 weeks (around 7/20/2023) for Recheck.         I, Citlali Castellon, am scribing for, and in the presence of, Esteban Veronica MD.    IEsteban MD, personally performed the services described in this documentation as scribed by Citlali Castellon in my presence, and it is both accurate and complete.       Esteban Veronica MD  6/22/2023

## 2023-06-22 NOTE — ASSESSMENT & PLAN NOTE
The patient's blood pressure is fairly well controlled at this point.  I will make some minor medication adjustments in order to have the patient reach our goal.  She will increase her hydrochlorothiazide to 25 mg a day and she will continue amlodipine at 5 mg a day.  She will return to the office in 4 weeks at which time we can reassess her.  She will monitor her blood pressure at home and will take extra amlodipine tablets if her pressure is elevated.  We can then decide if a higher dose of amlodipine is appropriate for her.  She will contact me with any problems or questions prior to her upcoming visit.

## 2023-06-22 NOTE — ASSESSMENT & PLAN NOTE
The patient has only mild mitral insufficiency but no significant valvular lesions.  We will follow her clinically.

## 2023-07-10 DIAGNOSIS — J45.20 MILD INTERMITTENT ASTHMA WITHOUT COMPLICATION: ICD-10-CM

## 2023-07-10 RX ORDER — BUDESONIDE AND FORMOTEROL FUMARATE DIHYDRATE 160; 4.5 UG/1; UG/1
2 AEROSOL RESPIRATORY (INHALATION) 2 TIMES DAILY
Qty: 10.2 G | Refills: 3 | Status: SHIPPED | OUTPATIENT
Start: 2023-07-10 | End: 2024-10-02 | Stop reason: SDUPTHER

## 2023-07-14 ENCOUNTER — TELEPHONE (OUTPATIENT)
Dept: PRIMARY CARE | Facility: CLINIC | Age: 68
End: 2023-07-14
Payer: MEDICARE

## 2023-07-14 DIAGNOSIS — Z12.31 ENCOUNTER FOR SCREENING MAMMOGRAM FOR MALIGNANT NEOPLASM OF BREAST: Primary | ICD-10-CM

## 2023-07-19 NOTE — PROGRESS NOTES
Cardiology  Office Progress Note         Reason for visit:   Chief Complaint   Patient presents with   • Follow-up       HPI     Betina Malhotra is a 67 y.o. female who presents to the office for cardiovascular follow up and management of hypertension and mitral insufficiency.      She was last seen in the office on 23. I asked her to increase HCTZ to 25 mg daily. She was to continue Amlodipine 5 mg daily and follow up in the office in 4 weeks. She will monitor her blood pressure at home and will take extra amlodipine tablets if her pressure is elevated.     She reports not being able to tolerate increased dose of Amlodipine due to hypotension. She reports that her home SBP were 90s-100s. She had some 2.5 mg of Amlodpine left at home so she started taking that instead.    She is currently taking HCTZ 25 mg daily, Losartan 100 mg and Amlodipine 2.5 mg daily. Her home BP ranging 110s/70s. She denies any cardiac complaints.     Past Medical History:   Diagnosis Date   • Anemia    • Arthritis    • Asthma    • Hypertension    • Hypothyroidism    • Murmur    • PONV (postoperative nausea and vomiting)    • Seasonal allergies        Past Surgical History:   Procedure Laterality Date   • BUNIONECTOMY Left    • COLONOSCOPY     • KNEE ARTHROSCOPY Left    • TONSILLECTOMY         Social History     Tobacco Use   • Smoking status: Never   • Smokeless tobacco: Never   Vaping Use   • Vaping Use: Never used   Substance Use Topics   • Alcohol use: Yes     Comment: occasionally   • Drug use: No       Family History   Problem Relation Age of Onset   • Hypertension Biological Mother    • Pancreatic cancer Biological Mother          at age 80   • Pancreatic cancer Biological Father          at age 52   • Heart disease Biological Brother         hx of stents in his 50s   • Hypertension Biological Brother    • No Known Problems Biological Brother    • Hypertension Biological Brother        Allergies:  Dog dander    Current  Outpatient Medications   Medication Sig Dispense Refill   • albuterol HFA (VENTOLIN HFA) 90 mcg/actuation inhaler Inhale 2 puffs every 6 (six) hours as needed for wheezing.     • amLODIPine (NORVASC) 5 mg tablet Take 1 tablet (5 mg total) by mouth daily. (Patient taking differently: Take 2.5 mg by mouth daily.) 90 tablet 3   • ARMOUR THYROID 60 mg tablet TAKE 3/4 (0.75) TABLET ONCE A DAY     • budesonide-formoteroL (SYMBICORT) 160-4.5 mcg/actuation inhaler Inhale 2 puffs 2 (two) times a day. Rinse mouth with water after use to reduce aftertaste and incidence of candidiasis. Do not swallow. 10.2 g 3   • calcium carbonate/vitamin D3 (CALTRATE 600 + D ORAL) Take by mouth every evening.       • hydrochlorothiazide (HYDRODIURIL) 12.5 mg tablet Take 2 tablets (25 mg total) by mouth daily. 90 tablet 1   • losartan (COZAAR) 100 mg tablet TAKE 1 TABLET BY MOUTH EVERY DAY 90 tablet 1   • MAGNESIUM ORAL Take 400 mg by mouth every evening.       • montelukast (SINGULAIR) 10 mg tablet TAKE 1 TABLET BY MOUTH EVERY DAY 90 tablet 0   • valACYclovir (VALTREX) 1 gram tablet TAKE 1 TABLET BY MOUTH TWICE A DAY FOR 7 DAYS (Patient taking differently: as needed.) 30 tablet 0     No current facility-administered medications for this visit.       Review of Systems   Cardiovascular: Negative for chest pain, dyspnea on exertion, leg swelling, near-syncope, palpitations and syncope.   Neurological: Negative for dizziness and light-headedness.       Objective     Vitals:    07/20/23 0800   BP: 132/78   Pulse: (!) 51   SpO2: 98%       Wt Readings from Last 5 Encounters:   07/20/23 60.8 kg (134 lb)   06/13/23 59 kg (130 lb)   03/29/23 61 kg (134 lb 7.7 oz)   03/29/23 60.8 kg (134 lb)   02/22/23 62 kg (136 lb 9.6 oz)       Body mass index is 24.51 kg/m².    Physical Exam  Vitals and nursing note reviewed.   Constitutional:       General: She is not in acute distress.     Appearance: Normal appearance.   HENT:      Head: Normocephalic.   Eyes:       Extraocular Movements: Extraocular movements intact.   Cardiovascular:      Rate and Rhythm: Regular rhythm. Bradycardia present.      Pulses: Normal pulses.      Heart sounds: Normal heart sounds.   Pulmonary:      Effort: Pulmonary effort is normal. No respiratory distress.      Breath sounds: Normal breath sounds. No wheezing or rales.   Abdominal:      General: There is no distension.      Palpations: Abdomen is soft.   Musculoskeletal:         General: Normal range of motion.      Cervical back: Normal range of motion.      Right lower leg: No edema.      Left lower leg: No edema.      Comments: Right leg cast   Skin:     General: Skin is warm and dry.      Findings: No rash.   Neurological:      General: No focal deficit present.      Mental Status: She is alert and oriented to person, place, and time.   Psychiatric:         Mood and Affect: Mood normal.         Behavior: Behavior normal.         Thought Content: Thought content normal.         Judgment: Judgment normal.          ECG: Sinus bradycardia with first-degree AV block, otherwise normal    Hematology  Lab Results   Component Value Date    WBC 5.53 06/16/2023    HGB 11.5 (L) 06/16/2023    HCT 35.6 06/16/2023     06/16/2023       Chemistries  Lab Results   Component Value Date     06/16/2023    K 3.5 (L) 06/16/2023     06/16/2023    CREATININE 0.9 06/16/2023    BUN 24 (H) 06/16/2023    CO2 27 06/16/2023    GLUCOSE 99 06/16/2023    CALCIUM 8.7 (L) 06/16/2023    ALT 18 06/16/2023    AST 27 06/16/2023       Cholesterol  Lab Results   Component Value Date    CHOL 215 (H) 05/19/2022    TRIG 54 05/19/2022    HDL 95 05/19/2022    LDLCALC 110 (H) 05/19/2022       Endocrine  Lab Results   Component Value Date    TSH 0.700 05/19/2022       Cardiac Imaging    TRANSTHORACIC ECHO (TTE) COMPLETE 03/29/2023    Interpretation Summary  •  Left Ventricle: Normal ventricle size. Normal wall thickness. Normal systolic function. Estimated EF 60-65%.  Wall motion appears grossly normal. Normal diastolic filling pattern for age.  •  Right Ventricle: Normal ventricle size. Normal systolic function.  •  Left Atrium: Normal sized atrium.  •  Right Atrium: Normal sized atrium.  •  Aortic Valve: Tricuspid valve.  Sclerotic leaflets. No regurgitation. No stenosis. Calculated dimensionless index = 0.89.  •  Mitral Valve: Normal leaflet structure. Normal leaflet motion. Mild regurgitation. No stenosis.  •  Tricuspid Valve: Structure is grossly normal. Mild regurgitation. Estimated RVSP = 32 mmHg. No significant stenosis.  •  Pulmonic Valve: Normal structure. Trace regurgitation. No stenosis.  •  Aorta: Aortic root grossly normal. Sinuses of Valsalva grossly normal. Ascending aorta grossly normal. Descending aorta grossly normal. Mild dilatation of the ascending aorta.  •  IVC/SVC: Inferior vena cava is dilated (>2.1cm). Inferior vena cava demonstrates normal respiratory collapse.  •  Pericardium: No evidence of pericardial effusion.                Assessment/Plan     Primary hypertension  The patient's blood pressure is well controlled on the current medical regimen.  There are no apparent side effects from medications.  We will continue the current therapy without change.    Murmur  The patient has only mild mitral insufficiency by echocardiographic examination.  We will follow her clinically for this issue.    Acquired hypothyroidism  The patient remains on thyroid supplementation which is managed by the patient's primary care provider.      No orders of the defined types were placed in this encounter.      There are no discontinued medications.    Orders Placed This Encounter   Procedures   • [facility] MUSE ECG 12 lead (clinic performed)     Scheduling Instructions:      PLEASE USE THIS ORDER FOR ECG'S PERFORMED IN PHYSICIAN OFFICES     Order Specific Question:   Release to patient     Answer:   Immediate       Follow Up Plans:  Return in about 6 months (around  1/20/2024) for Recheck.       I, Citlali Castellon, am scribing for, and in the presence of, Esteban Veronica MD.    I, Esteban Veronica MD, personally performed the services described in this documentation as scribed by Citlali Castellon in my presence, and it is both accurate and complete.       Esteban Veronica MD  7/20/2023

## 2023-07-20 ENCOUNTER — OFFICE VISIT (OUTPATIENT)
Dept: CARDIOLOGY | Facility: CLINIC | Age: 68
End: 2023-07-20
Payer: MEDICARE

## 2023-07-20 VITALS
DIASTOLIC BLOOD PRESSURE: 78 MMHG | WEIGHT: 134 LBS | HEIGHT: 62 IN | OXYGEN SATURATION: 98 % | BODY MASS INDEX: 24.66 KG/M2 | SYSTOLIC BLOOD PRESSURE: 132 MMHG | HEART RATE: 51 BPM

## 2023-07-20 DIAGNOSIS — I10 PRIMARY HYPERTENSION: Primary | ICD-10-CM

## 2023-07-20 DIAGNOSIS — R01.1 MURMUR: ICD-10-CM

## 2023-07-20 DIAGNOSIS — E03.9 ACQUIRED HYPOTHYROIDISM: ICD-10-CM

## 2023-07-20 DIAGNOSIS — J45.20 MILD INTERMITTENT ASTHMA WITHOUT COMPLICATION: ICD-10-CM

## 2023-07-20 LAB
ATRIAL RATE: 51
P AXIS: 36
PR INTERVAL: 210
QRS DURATION: 88
QT INTERVAL: 442
QTC CALCULATION(BAZETT): 407
R AXIS: 36
T WAVE AXIS: 67
VENTRICULAR RATE: 51

## 2023-07-20 PROCEDURE — G8752 SYS BP LESS 140: HCPCS | Performed by: INTERNAL MEDICINE

## 2023-07-20 PROCEDURE — 99214 OFFICE O/P EST MOD 30 MIN: CPT | Performed by: INTERNAL MEDICINE

## 2023-07-20 PROCEDURE — G8754 DIAS BP LESS 90: HCPCS | Performed by: INTERNAL MEDICINE

## 2023-07-20 PROCEDURE — 93000 ELECTROCARDIOGRAM COMPLETE: CPT | Performed by: INTERNAL MEDICINE

## 2023-07-20 ASSESSMENT — ENCOUNTER SYMPTOMS
LIGHT-HEADEDNESS: 0
PALPITATIONS: 0
NEAR-SYNCOPE: 0
SYNCOPE: 0
DYSPNEA ON EXERTION: 0
DIZZINESS: 0

## 2023-07-20 NOTE — ASSESSMENT & PLAN NOTE
The patient remains on thyroid supplementation which is managed by the patient's primary care provider.

## 2023-07-20 NOTE — ASSESSMENT & PLAN NOTE
The patient has only mild mitral insufficiency by echocardiographic examination.  We will follow her clinically for this issue.

## 2023-07-20 NOTE — LETTER
July 20, 2023     TERESA Torres  [address] [facility] [address]  [place] [address]    Patient: Betina Malhotra  YOB: 1955  Date of Visit: 7/20/2023      Dear Dr. Paz:    Thank you for referring Betina Malhotra to me for evaluation. Below are my notes for this consultation.    If you have questions, please do not hesitate to call me. I look forward to following your patient along with you.         Sincerely,        Esteban Veronica MD        CC: No Recipients    Esteban Veronica MD  7/20/2023  8:40 AM  Signed       Cardiology  Office Progress Note         Reason for visit:   Chief Complaint   Patient presents with   • Follow-up       HPI     Betina Malhotra is a 67 y.o. female who presents to the office for cardiovascular follow up and management of hypertension and mitral insufficiency.      She was last seen in the office on 6/22/23. I asked her to increase HCTZ to 25 mg daily. She was to continue Amlodipine 5 mg daily and follow up in the office in 4 weeks. She will monitor her blood pressure at home and will take extra amlodipine tablets if her pressure is elevated.     She reports not being able to tolerate increased dose of Amlodipine due to hypotension. She reports that her home SBP were 90s-100s. She had some 2.5 mg of Amlodpine left at home so she started taking that instead.    She is currently taking HCTZ 25 mg daily, Losartan 100 mg and Amlodipine 2.5 mg daily. Her home BP ranging 110s/70s. She denies any cardiac complaints.     Past Medical History:   Diagnosis Date   • Anemia    • Arthritis    • Asthma    • Hypertension    • Hypothyroidism    • Murmur    • PONV (postoperative nausea and vomiting)    • Seasonal allergies        Past Surgical History:   Procedure Laterality Date   • BUNIONECTOMY Left    • COLONOSCOPY     • KNEE ARTHROSCOPY Left    • TONSILLECTOMY         Social History     Tobacco Use   • Smoking status: Never   • Smokeless tobacco: Never   Vaping Use    • Vaping Use: Never used   Substance Use Topics   • Alcohol use: Yes     Comment: occasionally   • Drug use: No       Family History   Problem Relation Age of Onset   • Hypertension Biological Mother    • Pancreatic cancer Biological Mother          at age 80   • Pancreatic cancer Biological Father          at age 52   • Heart disease Biological Brother         hx of stents in his 50s   • Hypertension Biological Brother    • No Known Problems Biological Brother    • Hypertension Biological Brother        Allergies:  Dog dander    Current Outpatient Medications   Medication Sig Dispense Refill   • albuterol HFA (VENTOLIN HFA) 90 mcg/actuation inhaler Inhale 2 puffs every 6 (six) hours as needed for wheezing.     • amLODIPine (NORVASC) 5 mg tablet Take 1 tablet (5 mg total) by mouth daily. (Patient taking differently: Take 2.5 mg by mouth daily.) 90 tablet 3   • ARMOUR THYROID 60 mg tablet TAKE 3/4 (0.75) TABLET ONCE A DAY     • budesonide-formoteroL (SYMBICORT) 160-4.5 mcg/actuation inhaler Inhale 2 puffs 2 (two) times a day. Rinse mouth with water after use to reduce aftertaste and incidence of candidiasis. Do not swallow. 10.2 g 3   • calcium carbonate/vitamin D3 (CALTRATE 600 + D ORAL) Take by mouth every evening.       • hydrochlorothiazide (HYDRODIURIL) 12.5 mg tablet Take 2 tablets (25 mg total) by mouth daily. 90 tablet 1   • losartan (COZAAR) 100 mg tablet TAKE 1 TABLET BY MOUTH EVERY DAY 90 tablet 1   • MAGNESIUM ORAL Take 400 mg by mouth every evening.       • montelukast (SINGULAIR) 10 mg tablet TAKE 1 TABLET BY MOUTH EVERY DAY 90 tablet 0   • valACYclovir (VALTREX) 1 gram tablet TAKE 1 TABLET BY MOUTH TWICE A DAY FOR 7 DAYS (Patient taking differently: as needed.) 30 tablet 0     No current facility-administered medications for this visit.       Review of Systems   Cardiovascular: Negative for chest pain, dyspnea on exertion, leg swelling, near-syncope, palpitations and syncope.   Neurological:  Negative for dizziness and light-headedness.       Objective     Vitals:    07/20/23 0800   BP: 132/78   Pulse: (!) 51   SpO2: 98%       Wt Readings from Last 5 Encounters:   07/20/23 60.8 kg (134 lb)   06/13/23 59 kg (130 lb)   03/29/23 61 kg (134 lb 7.7 oz)   03/29/23 60.8 kg (134 lb)   02/22/23 62 kg (136 lb 9.6 oz)       Body mass index is 24.51 kg/m².    Physical Exam  Vitals and nursing note reviewed.   Constitutional:       General: She is not in acute distress.     Appearance: Normal appearance.   HENT:      Head: Normocephalic.   Eyes:      Extraocular Movements: Extraocular movements intact.   Cardiovascular:      Rate and Rhythm: Regular rhythm. Bradycardia present.      Pulses: Normal pulses.      Heart sounds: Normal heart sounds.   Pulmonary:      Effort: Pulmonary effort is normal. No respiratory distress.      Breath sounds: Normal breath sounds. No wheezing or rales.   Abdominal:      General: There is no distension.      Palpations: Abdomen is soft.   Musculoskeletal:         General: Normal range of motion.      Cervical back: Normal range of motion.      Right lower leg: No edema.      Left lower leg: No edema.      Comments: Right leg cast   Skin:     General: Skin is warm and dry.      Findings: No rash.   Neurological:      General: No focal deficit present.      Mental Status: She is alert and oriented to person, place, and time.   Psychiatric:         Mood and Affect: Mood normal.         Behavior: Behavior normal.         Thought Content: Thought content normal.         Judgment: Judgment normal.          ECG: Sinus bradycardia with first-degree AV block, otherwise normal    Hematology  Lab Results   Component Value Date    WBC 5.53 06/16/2023    HGB 11.5 (L) 06/16/2023    HCT 35.6 06/16/2023     06/16/2023       Chemistries  Lab Results   Component Value Date     06/16/2023    K 3.5 (L) 06/16/2023     06/16/2023    CREATININE 0.9 06/16/2023    BUN 24 (H) 06/16/2023     CO2 27 06/16/2023    GLUCOSE 99 06/16/2023    CALCIUM 8.7 (L) 06/16/2023    ALT 18 06/16/2023    AST 27 06/16/2023       Cholesterol  Lab Results   Component Value Date    CHOL 215 (H) 05/19/2022    TRIG 54 05/19/2022    HDL 95 05/19/2022    LDLCALC 110 (H) 05/19/2022       Endocrine  Lab Results   Component Value Date    TSH 0.700 05/19/2022       Cardiac Imaging    TRANSTHORACIC ECHO (TTE) COMPLETE 03/29/2023    Interpretation Summary  •  Left Ventricle: Normal ventricle size. Normal wall thickness. Normal systolic function. Estimated EF 60-65%. Wall motion appears grossly normal. Normal diastolic filling pattern for age.  •  Right Ventricle: Normal ventricle size. Normal systolic function.  •  Left Atrium: Normal sized atrium.  •  Right Atrium: Normal sized atrium.  •  Aortic Valve: Tricuspid valve.  Sclerotic leaflets. No regurgitation. No stenosis. Calculated dimensionless index = 0.89.  •  Mitral Valve: Normal leaflet structure. Normal leaflet motion. Mild regurgitation. No stenosis.  •  Tricuspid Valve: Structure is grossly normal. Mild regurgitation. Estimated RVSP = 32 mmHg. No significant stenosis.  •  Pulmonic Valve: Normal structure. Trace regurgitation. No stenosis.  •  Aorta: Aortic root grossly normal. Sinuses of Valsalva grossly normal. Ascending aorta grossly normal. Descending aorta grossly normal. Mild dilatation of the ascending aorta.  •  IVC/SVC: Inferior vena cava is dilated (>2.1cm). Inferior vena cava demonstrates normal respiratory collapse.  •  Pericardium: No evidence of pericardial effusion.                Assessment/Plan     Primary hypertension  The patient's blood pressure is well controlled on the current medical regimen.  There are no apparent side effects from medications.  We will continue the current therapy without change.    Murmur  The patient has only mild mitral insufficiency by echocardiographic examination.  We will follow her clinically for this issue.    Acquired  hypothyroidism  The patient remains on thyroid supplementation which is managed by the patient's primary care provider.      No orders of the defined types were placed in this encounter.      There are no discontinued medications.    Orders Placed This Encounter   Procedures   • [facility] MUSE ECG 12 lead (clinic performed)     Scheduling Instructions:      PLEASE USE THIS ORDER FOR ECG'S PERFORMED IN PHYSICIAN OFFICES     Order Specific Question:   Release to patient     Answer:   Immediate       Follow Up Plans:  Return in about 6 months (around 1/20/2024) for Recheck.      Citlali NUNO, am scribing for, and in the presence of, Esteban Veronica MD.    Esteban NUNO MD, personally performed the services described in this documentation as scribed by Citlali Castellon in my presence, and it is both accurate and complete.       Esteban Veronica MD  7/20/2023

## 2023-07-24 ENCOUNTER — HOSPITAL ENCOUNTER (OUTPATIENT)
Dept: RADIOLOGY | Age: 68
Discharge: HOME | End: 2023-07-24
Attending: NURSE PRACTITIONER
Payer: MEDICARE

## 2023-07-24 DIAGNOSIS — Z12.31 ENCOUNTER FOR SCREENING MAMMOGRAM FOR MALIGNANT NEOPLASM OF BREAST: ICD-10-CM

## 2023-07-24 PROCEDURE — 77067 SCR MAMMO BI INCL CAD: CPT

## 2023-07-25 RX ORDER — HYDROCHLOROTHIAZIDE 12.5 MG/1
25 TABLET ORAL DAILY
Qty: 90 TABLET | Refills: 1 | Status: SHIPPED | OUTPATIENT
Start: 2023-07-25 | End: 2023-10-17

## 2023-07-25 NOTE — TELEPHONE ENCOUNTER
[facility] [place]  Medicine Refill Request      MA Notes:      Nurse Notes:      Last Office Visit: 7/20/2023  Last Telemedicine Visit: Visit date not found    Next Office Visit: 1/30/2024  Next Telemedicine Visit: Visit date not found     Most Recent BP Readings:  BP Readings from Last 3 Encounters:   07/20/23 132/78   06/22/23 132/76   06/16/23 137/73       Most recent Lab results:  Lab Results   Component Value Date    CHOL 215 (H) 05/19/2022    HDL 95 05/19/2022    TRIG 54 05/19/2022       Lab Results   Component Value Date    AST 27 06/16/2023    ALT 18 06/16/2023       Lab Results   Component Value Date     06/16/2023    K 3.5 (L) 06/16/2023    BUN 24 (H) 06/16/2023    CREATININE 0.9 06/16/2023       Current Medications:    Current Outpatient Medications:   •  albuterol HFA (VENTOLIN HFA) 90 mcg/actuation inhaler, Inhale 2 puffs every 6 (six) hours as needed for wheezing., Disp: , Rfl:   •  amLODIPine (NORVASC) 5 mg tablet, Take 1 tablet (5 mg total) by mouth daily. (Patient taking differently: Take 2.5 mg by mouth daily.), Disp: 90 tablet, Rfl: 3  •  ARMOUR THYROID 60 mg tablet, TAKE 3/4 (0.75) TABLET ONCE A DAY, Disp: , Rfl:   •  budesonide-formoteroL (SYMBICORT) 160-4.5 mcg/actuation inhaler, Inhale 2 puffs 2 (two) times a day. Rinse mouth with water after use to reduce aftertaste and incidence of candidiasis. Do not swallow., Disp: 10.2 g, Rfl: 3  •  calcium carbonate/vitamin D3 (CALTRATE 600 + D ORAL), Take by mouth every evening.  , Disp: , Rfl:   •  hydrochlorothiazide (HYDRODIURIL) 12.5 mg tablet, Take 2 tablets (25 mg total) by mouth daily., Disp: 90 tablet, Rfl: 1  •  losartan (COZAAR) 100 mg tablet, TAKE 1 TABLET BY MOUTH EVERY DAY, Disp: 90 tablet, Rfl: 1  •  MAGNESIUM ORAL, Take 400 mg by mouth every evening.  , Disp: , Rfl:   •  montelukast (SINGULAIR) 10 mg tablet, TAKE 1 TABLET BY MOUTH EVERY DAY, Disp: 90 tablet, Rfl: 0  •  valACYclovir (VALTREX) 1 gram tablet, TAKE 1 TABLET  BY MOUTH TWICE A DAY FOR 7 DAYS (Patient taking differently: as needed.), Disp: 30 tablet, Rfl: 0

## 2023-08-11 ENCOUNTER — OFFICE VISIT (OUTPATIENT)
Dept: PRIMARY CARE | Facility: CLINIC | Age: 68
End: 2023-08-11
Payer: MEDICARE

## 2023-08-11 VITALS
BODY MASS INDEX: 24.25 KG/M2 | TEMPERATURE: 97.9 F | OXYGEN SATURATION: 98 % | SYSTOLIC BLOOD PRESSURE: 124 MMHG | DIASTOLIC BLOOD PRESSURE: 78 MMHG | HEART RATE: 65 BPM | WEIGHT: 132.6 LBS

## 2023-08-11 DIAGNOSIS — R59.0 ENLARGED LYMPH NODES IN ARMPIT: Primary | ICD-10-CM

## 2023-08-11 PROCEDURE — G8754 DIAS BP LESS 90: HCPCS | Performed by: NURSE PRACTITIONER

## 2023-08-11 PROCEDURE — G8752 SYS BP LESS 140: HCPCS | Performed by: NURSE PRACTITIONER

## 2023-08-11 PROCEDURE — 99213 OFFICE O/P EST LOW 20 MIN: CPT | Performed by: NURSE PRACTITIONER

## 2023-08-11 RX ORDER — DOXYCYCLINE HYCLATE 100 MG
100 TABLET ORAL 2 TIMES DAILY
Qty: 14 TABLET | Refills: 0 | Status: SHIPPED | OUTPATIENT
Start: 2023-08-11 | End: 2023-08-18

## 2023-08-11 NOTE — PROGRESS NOTES
Subjective     Patient ID: Betina Malhotra is a 67 y.o. female.    HPI  Right axilla lymph node enlarged and painful. Noticed 2 nights ago.     Review of Systems    Objective     Vitals:    08/11/23 1149   BP: 124/78   Pulse: 65   Temp: 36.6 °C (97.9 °F)   SpO2: 98%   Weight: 60.1 kg (132 lb 9.6 oz)     Body mass index is 24.25 kg/m².    Physical Exam  Vitals reviewed.   Constitutional:       Appearance: Normal appearance.   Musculoskeletal:         General: Normal range of motion.   Lymphadenopathy:      Upper Body:      Right upper body: Axillary adenopathy present.      Comments: No redness, palpable lymph node   Skin:     General: Skin is warm and dry.   Neurological:      Mental Status: She is alert and oriented to person, place, and time.         Assessment/Plan   Diagnoses and all orders for this visit:    Enlarged lymph nodes in armpit (Primary)    Other orders  -     doxycycline hyclate (VIBRA-TABS) 100 mg tablet; Take 1 tablet (100 mg total) by mouth 2 (two) times a day for 7 days.    Discussed and reviewed plan with patient will do doxycycline BID x 1 week. All questions and concerns have been addressed. Patient will contact the office if symptoms fail to improve or worsen.  If not resolving will check US.     TERESA Arevalo

## 2023-09-19 RX ORDER — ALBUTEROL SULFATE 90 UG/1
2 INHALANT RESPIRATORY (INHALATION) EVERY 6 HOURS PRN
Qty: 18 G | Refills: 1 | Status: SHIPPED | OUTPATIENT
Start: 2023-09-19 | End: 2023-10-25

## 2023-10-17 RX ORDER — HYDROCHLOROTHIAZIDE 12.5 MG/1
25 TABLET ORAL DAILY
Qty: 180 TABLET | Refills: 1 | Status: SHIPPED | OUTPATIENT
Start: 2023-10-17 | End: 2024-01-10

## 2023-10-17 NOTE — TELEPHONE ENCOUNTER
[facility] Refill Request      MA Notes:      Nurse Notes:      Last Office Visit: 7/20/2023  Last Telemedicine Visit: Visit date not found    Next Office Visit: Visit date not found  Next Telemedicine Visit: Visit date not found     Most Recent BP Readings:  BP Readings from Last 3 Encounters:   08/11/23 124/78   07/20/23 132/78   06/22/23 132/76       Most recent Lab results:  Lab Results   Component Value Date    CHOL 215 (H) 05/19/2022    HDL 95 05/19/2022    TRIG 54 05/19/2022       Lab Results   Component Value Date    AST 27 06/16/2023    ALT 18 06/16/2023       Lab Results   Component Value Date     06/16/2023    K 3.5 (L) 06/16/2023    BUN 24 (H) 06/16/2023    CREATININE 0.9 06/16/2023       Current Medications:    Current Outpatient Medications:     albuterol HFA 90 mcg/actuation inhaler, Inhale 2 puffs every 6 (six) hours as needed for wheezing., Disp: 18 g, Rfl: 1    amLODIPine (NORVASC) 5 mg tablet, Take 1 tablet (5 mg total) by mouth daily. (Patient taking differently: Take 2.5 mg by mouth daily.), Disp: 90 tablet, Rfl: 3    ARMOUR THYROID 60 mg tablet, TAKE 3/4 (0.75) TABLET ONCE A DAY, Disp: , Rfl:     budesonide-formoteroL (SYMBICORT) 160-4.5 mcg/actuation inhaler, Inhale 2 puffs 2 (two) times a day. Rinse mouth with water after use to reduce aftertaste and incidence of candidiasis. Do not swallow., Disp: 10.2 g, Rfl: 3    calcium carbonate/vitamin D3 (CALTRATE 600 + D ORAL), Take by mouth every evening.  , Disp: , Rfl:     hydrochlorothiazide (HYDRODIURIL) 12.5 mg tablet, Take 2 tablets (25 mg total) by mouth daily., Disp: 90 tablet, Rfl: 1    losartan (COZAAR) 100 mg tablet, TAKE 1 TABLET BY MOUTH EVERY DAY, Disp: 90 tablet, Rfl: 1    MAGNESIUM ORAL, Take 400 mg by mouth every evening.  , Disp: , Rfl:     montelukast (SINGULAIR) 10 mg tablet, TAKE 1 TABLET BY MOUTH EVERY DAY, Disp: 90 tablet, Rfl: 0    valACYclovir (VALTREX) 1 gram tablet, TAKE 1  TABLET BY MOUTH TWICE A DAY FOR 7 DAYS (Patient taking differently: as needed.), Disp: 30 tablet, Rfl: 0

## 2023-10-25 RX ORDER — ALBUTEROL SULFATE 90 UG/1
INHALANT RESPIRATORY (INHALATION)
Qty: 18 G | Refills: 1 | Status: SHIPPED | OUTPATIENT
Start: 2023-10-25 | End: 2024-09-05 | Stop reason: SDUPTHER

## 2023-11-29 RX ORDER — LOSARTAN POTASSIUM 100 MG/1
100 TABLET ORAL DAILY
Qty: 90 TABLET | Refills: 1 | Status: SHIPPED | OUTPATIENT
Start: 2023-11-29 | End: 2024-05-25

## 2024-01-10 RX ORDER — HYDROCHLOROTHIAZIDE 12.5 MG/1
12.5 TABLET ORAL DAILY
Qty: 90 TABLET | Refills: 1 | Status: SHIPPED | OUTPATIENT
Start: 2024-01-10 | End: 2024-06-10

## 2024-01-24 NOTE — PROGRESS NOTES
Cardiology  Office Progress Note         Reason for visit:   Chief Complaint   Patient presents with   • Follow-up       HPI     Betina Malhotra is a 68 y.o. female who presents to the office for cardiovascular follow up and management of hypertension and mitral insufficiency.      She was last seen in the office on 23.  At that visit I made no changes to her medical regimen and asked her to follow up in 6 months.    She denies any chest pain, shortness of breath, edema, palpitations, near syncope or syncope.    She works out 4 days a week either doing step classes or weight training.    FLP 22:  , TG 54, HDL 95,       Past Medical History:   Diagnosis Date   • Anemia    • Arthritis    • Asthma    • Hypertension    • Hypothyroidism    • Murmur    • PONV (postoperative nausea and vomiting)    • Seasonal allergies        Past Surgical History:   Procedure Laterality Date   • BUNIONECTOMY Left    • COLONOSCOPY     • KNEE ARTHROSCOPY Left    • TONSILLECTOMY         Social History     Tobacco Use   • Smoking status: Never   • Smokeless tobacco: Never   Vaping Use   • Vaping Use: Never used   Substance Use Topics   • Alcohol use: Yes     Comment: occasionally   • Drug use: No       Family History   Problem Relation Age of Onset   • Hypertension Biological Mother    • Pancreatic cancer Biological Mother          at age 80   • Pancreatic cancer Biological Father          at age 52   • Heart disease Biological Brother         hx of stents in his 50s   • Hypertension Biological Brother    • No Known Problems Biological Brother    • Hypertension Biological Brother        Allergies:  Dog dander    Current Outpatient Medications   Medication Sig Dispense Refill   • albuterol HFA 90 mcg/actuation inhaler INHALE 2 PUFFS BY MOUTH EVERY 6 HOURS AS NEEDED FORWHEEZING 18 g 1   • amLODIPine (NORVASC) 5 mg tablet Take 1 tablet (5 mg total) by mouth daily. (Patient taking differently: Take 2.5 mg by  mouth daily.) 90 tablet 3   • ARMOUR THYROID 60 mg tablet TAKE 3/4 (0.75) TABLET ONCE A DAY     • budesonide-formoteroL (SYMBICORT) 160-4.5 mcg/actuation inhaler Inhale 2 puffs 2 (two) times a day. Rinse mouth with water after use to reduce aftertaste and incidence of candidiasis. Do not swallow. 10.2 g 3   • calcium carbonate/vitamin D3 (CALTRATE 600 + D ORAL) Take by mouth every evening.       • hydrochlorothiazide 12.5 mg tablet TAKE 1 TABLET BY MOUTH EVERY DAY 90 tablet 1   • losartan (COZAAR) 100 mg tablet TAKE 1 TABLET BY MOUTH EVERY DAY 90 tablet 1   • MAGNESIUM ORAL Take 400 mg by mouth every evening.       • montelukast (SINGULAIR) 10 mg tablet TAKE 1 TABLET BY MOUTH EVERY DAY 90 tablet 0   • valACYclovir (VALTREX) 1 gram tablet TAKE 1 TABLET BY MOUTH TWICE A DAY FOR 7 DAYS (Patient taking differently: as needed.) 30 tablet 0     No current facility-administered medications for this visit.       Review of Systems   Constitutional: Negative for malaise/fatigue.   Cardiovascular: Negative for chest pain, dyspnea on exertion, irregular heartbeat, leg swelling, near-syncope, orthopnea, palpitations and syncope.   Hematologic/Lymphatic: Does not bruise/bleed easily.   Neurological: Negative for dizziness and light-headedness.   All other systems reviewed and are negative.      Objective     Vitals:    01/30/24 0834   BP: 128/78   Pulse: (!) 49   SpO2: 99%       Wt Readings from Last 5 Encounters:   01/30/24 59.9 kg (132 lb)   08/11/23 60.1 kg (132 lb 9.6 oz)   07/20/23 60.8 kg (134 lb)   06/13/23 59 kg (130 lb)   03/29/23 61 kg (134 lb 7.7 oz)       Body mass index is 24.14 kg/m².    Physical Exam  Vitals and nursing note reviewed.   Constitutional:       General: She is not in acute distress.     Appearance: Normal appearance.   HENT:      Head: Normocephalic.   Eyes:      Extraocular Movements: Extraocular movements intact.   Cardiovascular:      Rate and Rhythm: Regular rhythm. Bradycardia present.       Pulses: Normal pulses.      Heart sounds: Normal heart sounds.   Pulmonary:      Effort: Pulmonary effort is normal. No respiratory distress.      Breath sounds: Normal breath sounds. No wheezing or rales.   Abdominal:      General: There is no distension.      Palpations: Abdomen is soft.   Musculoskeletal:         General: Normal range of motion.      Cervical back: Normal range of motion.      Right lower leg: No edema.      Left lower leg: No edema.   Skin:     General: Skin is warm and dry.      Findings: No rash.   Neurological:      General: No focal deficit present.      Mental Status: She is alert and oriented to person, place, and time.   Psychiatric:         Mood and Affect: Mood normal.         Behavior: Behavior normal.         Thought Content: Thought content normal.         Judgment: Judgment normal.          ECG: Sinus bradycardia, cannot rule out old anterior MI, otherwise unremarkable.  Stable pattern    Hematology  Lab Results   Component Value Date    WBC 5.53 06/16/2023    HGB 11.5 (L) 06/16/2023    HCT 35.6 06/16/2023     06/16/2023       Chemistries  Lab Results   Component Value Date     06/16/2023    K 3.5 (L) 06/16/2023     06/16/2023    CREATININE 0.9 06/16/2023    BUN 24 (H) 06/16/2023    CO2 27 06/16/2023    GLUCOSE 99 06/16/2023    CALCIUM 8.7 (L) 06/16/2023    ALT 18 06/16/2023    AST 27 06/16/2023       Cholesterol  Lab Results   Component Value Date    CHOL 215 (H) 05/19/2022    TRIG 54 05/19/2022    HDL 95 05/19/2022    LDLCALC 110 (H) 05/19/2022       Endocrine  Lab Results   Component Value Date    TSH 0.700 05/19/2022       Cardiac Imaging    TRANSTHORACIC ECHO (TTE) COMPLETE 03/29/2023    Interpretation Summary  •  Left Ventricle: Normal ventricle size. Normal wall thickness. Normal systolic function. Estimated EF 60-65%. Wall motion appears grossly normal. Normal diastolic filling pattern for age.  •  Right Ventricle: Normal ventricle size. Normal systolic  function.  •  Left Atrium: Normal sized atrium.  •  Right Atrium: Normal sized atrium.  •  Aortic Valve: Tricuspid valve.  Sclerotic leaflets. No regurgitation. No stenosis. Calculated dimensionless index = 0.89.  •  Mitral Valve: Normal leaflet structure. Normal leaflet motion. Mild regurgitation. No stenosis.  •  Tricuspid Valve: Structure is grossly normal. Mild regurgitation. Estimated RVSP = 32 mmHg. No significant stenosis.  •  Pulmonic Valve: Normal structure. Trace regurgitation. No stenosis.  •  Aorta: Aortic root grossly normal. Sinuses of Valsalva grossly normal. Ascending aorta grossly normal. Descending aorta grossly normal. Mild dilatation of the ascending aorta.  •  IVC/SVC: Inferior vena cava is dilated (>2.1cm). Inferior vena cava demonstrates normal respiratory collapse.  •  Pericardium: No evidence of pericardial effusion.                Assessment/Plan     Primary hypertension  The patient's blood pressure is well controlled on the current medical regimen.  There are no apparent side effects from medications.  We will continue the current therapy without change.    Murmur  The patient has a very faint systolic murmur.  She has no symptoms to suggest valvular heart disease.  We are following her clinically    Acquired hypothyroidism  The patient is on thyroid supplementation.  She will be following with her primary care provider in the near future and will have laboratory work performed    Mild intermittent asthma without complication  This problem is well-controlled.  The patient will continue following with her usual providers.    Pure hypercholesterolemia  The patient's most recent lipid profile is mildly abnormal.  I have asked her to have a coronary calcium score performed to better assess her risk.  This will allow us to determine whether or not medical therapy is appropriate for her.  She will be having repeat lipid profile performed in the near future at the direction of her primary care  provider      No orders of the defined types were placed in this encounter.      There are no discontinued medications.    Orders Placed This Encounter   Procedures   • CT HEART CORONARY ARTERY CALCIUM SCORE WITHOUT IV CONTRAST     Standing Status:   Future     Standing Expiration Date:   1/30/2025     Order Specific Question:   Release to patient     Answer:   Immediate [1]   • [facility] MUSE ECG 12 lead (clinic performed)     Scheduling Instructions:      PLEASE USE THIS ORDER FOR ECG'S PERFORMED IN PHYSICIAN OFFICES     Order Specific Question:   Release to patient     Answer:   Immediate [1]       Follow Up Plans:  Return in about 1 year (around 1/30/2025).          I, Leslie Avila, am scribing for, and in the presence of, Esteban Veronica MD.    IEsteban MD, personally performed the services described in this documentation as scribed by Leslie Avila in my presence, and it is both accurate and complete.       Esteban Veronica MD  1/30/2024

## 2024-01-30 ENCOUNTER — OFFICE VISIT (OUTPATIENT)
Dept: CARDIOLOGY | Facility: CLINIC | Age: 69
End: 2024-01-30
Payer: MEDICARE

## 2024-01-30 VITALS
SYSTOLIC BLOOD PRESSURE: 128 MMHG | OXYGEN SATURATION: 99 % | HEART RATE: 49 BPM | HEIGHT: 62 IN | WEIGHT: 132 LBS | BODY MASS INDEX: 24.29 KG/M2 | DIASTOLIC BLOOD PRESSURE: 78 MMHG

## 2024-01-30 DIAGNOSIS — I10 PRIMARY HYPERTENSION: ICD-10-CM

## 2024-01-30 DIAGNOSIS — J45.20 MILD INTERMITTENT ASTHMA WITHOUT COMPLICATION: ICD-10-CM

## 2024-01-30 DIAGNOSIS — R01.1 MURMUR: ICD-10-CM

## 2024-01-30 DIAGNOSIS — E78.00 PURE HYPERCHOLESTEROLEMIA: ICD-10-CM

## 2024-01-30 DIAGNOSIS — E03.9 ACQUIRED HYPOTHYROIDISM: ICD-10-CM

## 2024-01-30 DIAGNOSIS — E78.2 MIXED HYPERLIPIDEMIA: Primary | ICD-10-CM

## 2024-01-30 LAB
QRS DURATION: 80
QT INTERVAL: 432
QTC CALCULATION(BAZETT): 401
R AXIS: 39
T WAVE AXIS: 76
VENTRICULAR RATE: 52

## 2024-01-30 PROCEDURE — G8754 DIAS BP LESS 90: HCPCS | Performed by: INTERNAL MEDICINE

## 2024-01-30 PROCEDURE — G8752 SYS BP LESS 140: HCPCS | Performed by: INTERNAL MEDICINE

## 2024-01-30 PROCEDURE — 99214 OFFICE O/P EST MOD 30 MIN: CPT | Performed by: INTERNAL MEDICINE

## 2024-01-30 PROCEDURE — 93000 ELECTROCARDIOGRAM COMPLETE: CPT | Performed by: INTERNAL MEDICINE

## 2024-01-30 ASSESSMENT — ENCOUNTER SYMPTOMS
DYSPNEA ON EXERTION: 0
ORTHOPNEA: 0
BRUISES/BLEEDS EASILY: 0
IRREGULAR HEARTBEAT: 0
DIZZINESS: 0
PALPITATIONS: 0
LIGHT-HEADEDNESS: 0
NEAR-SYNCOPE: 0
SYNCOPE: 0

## 2024-01-30 NOTE — LETTER
2024     TERESA Torres  [address] [facility] [address]  [place] [address]    Patient: Betina Malhotra  YOB: 1955  Date of Visit: 2024      Dear Dr. Paz:    Thank you for referring Betina Malhotra to me for evaluation. Below are my notes for this consultation.    If you have questions, please do not hesitate to call me. I look forward to following your patient along with you.         Sincerely,        Esteban Veronica MD        CC: No Recipients    Esteban Veronica MD  2024  9:24 AM  Signed       Cardiology  Office Progress Note         Reason for visit:   Chief Complaint   Patient presents with   • Follow-up       HPI     Betina Malhotra is a 68 y.o. female who presents to the office for cardiovascular follow up and management of hypertension and mitral insufficiency.      She was last seen in the office on 23.  At that visit I made no changes to her medical regimen and asked her to follow up in 6 months.    She denies any chest pain, shortness of breath, edema, palpitations, near syncope or syncope.    She works out 4 days a week either doing step classes or weight training.    FLP 22:  , TG 54, HDL 95,       Past Medical History:   Diagnosis Date   • Anemia    • Arthritis    • Asthma    • Hypertension    • Hypothyroidism    • Murmur    • PONV (postoperative nausea and vomiting)    • Seasonal allergies        Past Surgical History:   Procedure Laterality Date   • BUNIONECTOMY Left    • COLONOSCOPY     • KNEE ARTHROSCOPY Left    • TONSILLECTOMY         Social History     Tobacco Use   • Smoking status: Never   • Smokeless tobacco: Never   Vaping Use   • Vaping Use: Never used   Substance Use Topics   • Alcohol use: Yes     Comment: occasionally   • Drug use: No       Family History   Problem Relation Age of Onset   • Hypertension Biological Mother    • Pancreatic cancer Biological Mother          at age 80   • Pancreatic cancer  Biological Father          at age 52   • Heart disease Biological Brother         hx of stents in his 50s   • Hypertension Biological Brother    • No Known Problems Biological Brother    • Hypertension Biological Brother        Allergies:  Dog dander    Current Outpatient Medications   Medication Sig Dispense Refill   • albuterol HFA 90 mcg/actuation inhaler INHALE 2 PUFFS BY MOUTH EVERY 6 HOURS AS NEEDED FORWHEEZING 18 g 1   • amLODIPine (NORVASC) 5 mg tablet Take 1 tablet (5 mg total) by mouth daily. (Patient taking differently: Take 2.5 mg by mouth daily.) 90 tablet 3   • ARMOUR THYROID 60 mg tablet TAKE 3/4 (0.75) TABLET ONCE A DAY     • budesonide-formoteroL (SYMBICORT) 160-4.5 mcg/actuation inhaler Inhale 2 puffs 2 (two) times a day. Rinse mouth with water after use to reduce aftertaste and incidence of candidiasis. Do not swallow. 10.2 g 3   • calcium carbonate/vitamin D3 (CALTRATE 600 + D ORAL) Take by mouth every evening.       • hydrochlorothiazide 12.5 mg tablet TAKE 1 TABLET BY MOUTH EVERY DAY 90 tablet 1   • losartan (COZAAR) 100 mg tablet TAKE 1 TABLET BY MOUTH EVERY DAY 90 tablet 1   • MAGNESIUM ORAL Take 400 mg by mouth every evening.       • montelukast (SINGULAIR) 10 mg tablet TAKE 1 TABLET BY MOUTH EVERY DAY 90 tablet 0   • valACYclovir (VALTREX) 1 gram tablet TAKE 1 TABLET BY MOUTH TWICE A DAY FOR 7 DAYS (Patient taking differently: as needed.) 30 tablet 0     No current facility-administered medications for this visit.       Review of Systems   Constitutional: Negative for malaise/fatigue.   Cardiovascular: Negative for chest pain, dyspnea on exertion, irregular heartbeat, leg swelling, near-syncope, orthopnea, palpitations and syncope.   Hematologic/Lymphatic: Does not bruise/bleed easily.   Neurological: Negative for dizziness and light-headedness.   All other systems reviewed and are negative.      Objective     Vitals:    24 0834   BP: 128/78   Pulse: (!) 49   SpO2: 99%       Wt  Readings from Last 5 Encounters:   01/30/24 59.9 kg (132 lb)   08/11/23 60.1 kg (132 lb 9.6 oz)   07/20/23 60.8 kg (134 lb)   06/13/23 59 kg (130 lb)   03/29/23 61 kg (134 lb 7.7 oz)       Body mass index is 24.14 kg/m².    Physical Exam  Vitals and nursing note reviewed.   Constitutional:       General: She is not in acute distress.     Appearance: Normal appearance.   HENT:      Head: Normocephalic.   Eyes:      Extraocular Movements: Extraocular movements intact.   Cardiovascular:      Rate and Rhythm: Regular rhythm. Bradycardia present.      Pulses: Normal pulses.      Heart sounds: Normal heart sounds.   Pulmonary:      Effort: Pulmonary effort is normal. No respiratory distress.      Breath sounds: Normal breath sounds. No wheezing or rales.   Abdominal:      General: There is no distension.      Palpations: Abdomen is soft.   Musculoskeletal:         General: Normal range of motion.      Cervical back: Normal range of motion.      Right lower leg: No edema.      Left lower leg: No edema.   Skin:     General: Skin is warm and dry.      Findings: No rash.   Neurological:      General: No focal deficit present.      Mental Status: She is alert and oriented to person, place, and time.   Psychiatric:         Mood and Affect: Mood normal.         Behavior: Behavior normal.         Thought Content: Thought content normal.         Judgment: Judgment normal.          ECG: Sinus bradycardia, cannot rule out old anterior MI, otherwise unremarkable.  Stable pattern    Hematology  Lab Results   Component Value Date    WBC 5.53 06/16/2023    HGB 11.5 (L) 06/16/2023    HCT 35.6 06/16/2023     06/16/2023       Chemistries  Lab Results   Component Value Date     06/16/2023    K 3.5 (L) 06/16/2023     06/16/2023    CREATININE 0.9 06/16/2023    BUN 24 (H) 06/16/2023    CO2 27 06/16/2023    GLUCOSE 99 06/16/2023    CALCIUM 8.7 (L) 06/16/2023    ALT 18 06/16/2023    AST 27 06/16/2023       Cholesterol  Lab  Results   Component Value Date    CHOL 215 (H) 05/19/2022    TRIG 54 05/19/2022    HDL 95 05/19/2022    LDLCALC 110 (H) 05/19/2022       Endocrine  Lab Results   Component Value Date    TSH 0.700 05/19/2022       Cardiac Imaging    TRANSTHORACIC ECHO (TTE) COMPLETE 03/29/2023    Interpretation Summary  •  Left Ventricle: Normal ventricle size. Normal wall thickness. Normal systolic function. Estimated EF 60-65%. Wall motion appears grossly normal. Normal diastolic filling pattern for age.  •  Right Ventricle: Normal ventricle size. Normal systolic function.  •  Left Atrium: Normal sized atrium.  •  Right Atrium: Normal sized atrium.  •  Aortic Valve: Tricuspid valve.  Sclerotic leaflets. No regurgitation. No stenosis. Calculated dimensionless index = 0.89.  •  Mitral Valve: Normal leaflet structure. Normal leaflet motion. Mild regurgitation. No stenosis.  •  Tricuspid Valve: Structure is grossly normal. Mild regurgitation. Estimated RVSP = 32 mmHg. No significant stenosis.  •  Pulmonic Valve: Normal structure. Trace regurgitation. No stenosis.  •  Aorta: Aortic root grossly normal. Sinuses of Valsalva grossly normal. Ascending aorta grossly normal. Descending aorta grossly normal. Mild dilatation of the ascending aorta.  •  IVC/SVC: Inferior vena cava is dilated (>2.1cm). Inferior vena cava demonstrates normal respiratory collapse.  •  Pericardium: No evidence of pericardial effusion.                Assessment/Plan     Primary hypertension  The patient's blood pressure is well controlled on the current medical regimen.  There are no apparent side effects from medications.  We will continue the current therapy without change.    Murmur  The patient has a very faint systolic murmur.  She has no symptoms to suggest valvular heart disease.  We are following her clinically    Acquired hypothyroidism  The patient is on thyroid supplementation.  She will be following with her primary care provider in the near future and  will have laboratory work performed    Mild intermittent asthma without complication  This problem is well-controlled.  The patient will continue following with her usual providers.    Pure hypercholesterolemia  The patient's most recent lipid profile is mildly abnormal.  I have asked her to have a coronary calcium score performed to better assess her risk.  This will allow us to determine whether or not medical therapy is appropriate for her.  She will be having repeat lipid profile performed in the near future at the direction of her primary care provider      No orders of the defined types were placed in this encounter.      There are no discontinued medications.    Orders Placed This Encounter   Procedures   • CT HEART CORONARY ARTERY CALCIUM SCORE WITHOUT IV CONTRAST     Standing Status:   Future     Standing Expiration Date:   1/30/2025     Order Specific Question:   Release to patient     Answer:   Immediate [1]   • [facility] MUSE ECG 12 lead (clinic performed)     Scheduling Instructions:      PLEASE USE THIS ORDER FOR ECG'S PERFORMED IN PHYSICIAN OFFICES     Order Specific Question:   Release to patient     Answer:   Immediate [1]       Follow Up Plans:  Return in about 1 year (around 1/30/2025).         I, Leslie Avila, am scribing for, and in the presence of, Esteban Veronica MD.    I, Esteban Veronica MD, personally performed the services described in this documentation as scribed by Leslie Avila in my presence, and it is both accurate and complete.       Esteban Veronica MD  1/30/2024

## 2024-01-30 NOTE — ASSESSMENT & PLAN NOTE
The patient has a very faint systolic murmur.  She has no symptoms to suggest valvular heart disease.  We are following her clinically

## 2024-01-30 NOTE — ASSESSMENT & PLAN NOTE
The patient's most recent lipid profile is mildly abnormal.  I have asked her to have a coronary calcium score performed to better assess her risk.  This will allow us to determine whether or not medical therapy is appropriate for her.  She will be having repeat lipid profile performed in the near future at the direction of her primary care provider

## 2024-01-30 NOTE — ASSESSMENT & PLAN NOTE
The patient is on thyroid supplementation.  She will be following with her primary care provider in the near future and will have laboratory work performed

## 2024-02-01 ENCOUNTER — HOSPITAL ENCOUNTER (OUTPATIENT)
Dept: RADIOLOGY | Age: 69
Discharge: HOME | End: 2024-02-01
Attending: INTERNAL MEDICINE
Payer: MEDICARE

## 2024-02-01 DIAGNOSIS — E78.2 MIXED HYPERLIPIDEMIA: ICD-10-CM

## 2024-02-01 DIAGNOSIS — I10 PRIMARY HYPERTENSION: ICD-10-CM

## 2024-02-01 PROCEDURE — 75571 CT HRT W/O DYE W/CA TEST: CPT | Mod: MG

## 2024-02-05 ENCOUNTER — OFFICE VISIT (OUTPATIENT)
Dept: PRIMARY CARE | Facility: CLINIC | Age: 69
End: 2024-02-05
Payer: MEDICARE

## 2024-02-05 VITALS
SYSTOLIC BLOOD PRESSURE: 120 MMHG | OXYGEN SATURATION: 96 % | HEART RATE: 72 BPM | TEMPERATURE: 96.6 F | WEIGHT: 132.6 LBS | HEIGHT: 62 IN | BODY MASS INDEX: 24.4 KG/M2 | DIASTOLIC BLOOD PRESSURE: 70 MMHG

## 2024-02-05 DIAGNOSIS — R93.1 AGATSTON CORONARY ARTERY CALCIUM SCORE GREATER THAN 400: Primary | ICD-10-CM

## 2024-02-05 PROCEDURE — G8754 DIAS BP LESS 90: HCPCS | Performed by: NURSE PRACTITIONER

## 2024-02-05 PROCEDURE — G8752 SYS BP LESS 140: HCPCS | Performed by: NURSE PRACTITIONER

## 2024-02-05 PROCEDURE — 99213 OFFICE O/P EST LOW 20 MIN: CPT | Performed by: NURSE PRACTITIONER

## 2024-02-05 NOTE — PROGRESS NOTES
"  Subjective     Patient ID: Betina Malhotra is a 68 y.o. female.    HPI  Presents to discuss recent CT calcium score.   Exercises, eats well, non smoker, social ETOH.     Review of Systems   Constitutional: Negative for activity change, appetite change, fatigue and fever.   HENT: Negative for congestion, ear pain, rhinorrhea, sinus pressure, sinus pain, sore throat and trouble swallowing.    Eyes: Negative for discharge, itching and visual disturbance.   Respiratory: Negative for cough and shortness of breath.    Cardiovascular: Negative for chest pain and leg swelling.   Gastrointestinal: Negative for abdominal distention, abdominal pain, constipation, diarrhea and nausea.   Endocrine: Negative for cold intolerance and heat intolerance.   Genitourinary: Negative for difficulty urinating, frequency and urgency.   Musculoskeletal: Negative for arthralgias, back pain, myalgias, neck pain and neck stiffness.   Skin: Negative for rash.   Allergic/Immunologic: Negative for environmental allergies.   Neurological: Negative for weakness, numbness and headaches.   Hematological: Does not bruise/bleed easily.   Psychiatric/Behavioral: Negative for decreased concentration and sleep disturbance. The patient is not nervous/anxious.        Objective     Vitals:    02/05/24 1321   BP: 120/70   Pulse: 72   Temp: (!) 35.9 °C (96.6 °F)   SpO2: 96%   Weight: 60.1 kg (132 lb 9.6 oz)   Height: 1.575 m (5' 2\")     Body mass index is 24.25 kg/m².    Physical Exam  Vitals reviewed.   Constitutional:       Appearance: Normal appearance.   Cardiovascular:      Rate and Rhythm: Normal rate and regular rhythm.      Heart sounds: Normal heart sounds.   Pulmonary:      Effort: Pulmonary effort is normal.      Breath sounds: Normal breath sounds.   Neurological:      Mental Status: She is alert and oriented to person, place, and time.         Assessment/Plan   Diagnoses and all orders for this visit:    Agatston coronary artery calcium score " greater than 400 (Primary)      Discussed and reviewed plan with patient , reviewed calcium score, prior LDL. Will check repeat labs. Will follow up with Dr Veronica. All questions and concerns addressed.    \TERESA Arevalo

## 2024-02-06 ENCOUNTER — APPOINTMENT (OUTPATIENT)
Dept: LAB | Age: 69
End: 2024-02-06
Attending: NURSE PRACTITIONER
Payer: MEDICARE

## 2024-02-06 DIAGNOSIS — E03.9 ACQUIRED HYPOTHYROIDISM: ICD-10-CM

## 2024-02-06 DIAGNOSIS — I10 PRIMARY HYPERTENSION: ICD-10-CM

## 2024-02-06 DIAGNOSIS — E78.00 PURE HYPERCHOLESTEROLEMIA: ICD-10-CM

## 2024-02-06 LAB
ALBUMIN SERPL-MCNC: 4.6 G/DL (ref 3.5–5.7)
ALP SERPL-CCNC: 31 IU/L (ref 34–125)
ALT SERPL-CCNC: 14 IU/L (ref 7–52)
ANION GAP SERPL CALC-SCNC: 6 MEQ/L (ref 3–15)
AST SERPL-CCNC: 23 IU/L (ref 13–39)
BASOPHILS # BLD: 0.05 K/UL (ref 0.01–0.1)
BASOPHILS NFR BLD: 1.1 %
BILIRUB SERPL-MCNC: 1.1 MG/DL (ref 0.3–1.2)
BUN SERPL-MCNC: 25 MG/DL (ref 7–25)
CALCIUM SERPL-MCNC: 9.4 MG/DL (ref 8.6–10.3)
CHLORIDE SERPL-SCNC: 105 MEQ/L (ref 98–107)
CHOLEST SERPL-MCNC: 214 MG/DL
CO2 SERPL-SCNC: 27 MEQ/L (ref 21–31)
CREAT SERPL-MCNC: 1 MG/DL (ref 0.6–1.2)
DIFFERENTIAL METHOD BLD: ABNORMAL
EGFRCR SERPLBLD CKD-EPI 2021: >60 ML/MIN/1.73M*2
EOSINOPHIL # BLD: 0.17 K/UL (ref 0.04–0.36)
EOSINOPHIL NFR BLD: 3.7 %
ERYTHROCYTE [DISTWIDTH] IN BLOOD BY AUTOMATED COUNT: 13.4 % (ref 11.7–14.4)
GLUCOSE SERPL-MCNC: 106 MG/DL (ref 70–99)
HCT VFR BLD AUTO: 38.4 % (ref 35–45)
HDLC SERPL-MCNC: 90 MG/DL
HDLC SERPL: 2.4 {RATIO}
HGB BLD-MCNC: 12.1 G/DL (ref 11.8–15.7)
IMM GRANULOCYTES # BLD AUTO: 0 K/UL (ref 0–0.08)
IMM GRANULOCYTES NFR BLD AUTO: 0 %
LDLC SERPL CALC-MCNC: 109 MG/DL
LYMPHOCYTES # BLD: 2.07 K/UL (ref 1.2–3.5)
LYMPHOCYTES NFR BLD: 45.1 %
MCH RBC QN AUTO: 29.7 PG (ref 28–33.2)
MCHC RBC AUTO-ENTMCNC: 31.5 G/DL (ref 32.2–35.5)
MCV RBC AUTO: 94.3 FL (ref 83–98)
MONOCYTES # BLD: 0.39 K/UL (ref 0.28–0.8)
MONOCYTES NFR BLD: 8.5 %
NEUTROPHILS # BLD: 1.91 K/UL (ref 1.7–7)
NEUTS SEG NFR BLD: 41.6 %
NONHDLC SERPL-MCNC: 124 MG/DL
NRBC BLD-RTO: 0 %
PDW BLD AUTO: 14.2 FL (ref 9.4–12.3)
PLATELET # BLD AUTO: 266 K/UL (ref 150–369)
POTASSIUM SERPL-SCNC: 3.9 MEQ/L (ref 3.5–5.1)
PROT SERPL-MCNC: 6.4 G/DL (ref 6–8.2)
RBC # BLD AUTO: 4.07 M/UL (ref 3.93–5.22)
SODIUM SERPL-SCNC: 138 MEQ/L (ref 136–145)
T4 FREE SERPL-MCNC: 0.84 NG/DL (ref 0.58–1.64)
TRIGL SERPL-MCNC: 74 MG/DL
TSH SERPL DL<=0.05 MIU/L-ACNC: 0.66 MIU/L (ref 0.34–5.6)
WBC # BLD AUTO: 4.59 K/UL (ref 3.8–10.5)

## 2024-02-06 PROCEDURE — 36415 COLL VENOUS BLD VENIPUNCTURE: CPT

## 2024-02-06 PROCEDURE — 84439 ASSAY OF FREE THYROXINE: CPT

## 2024-02-06 PROCEDURE — 85025 COMPLETE CBC W/AUTO DIFF WBC: CPT

## 2024-02-06 PROCEDURE — 80061 LIPID PANEL: CPT

## 2024-02-06 PROCEDURE — 84443 ASSAY THYROID STIM HORMONE: CPT

## 2024-02-06 PROCEDURE — 80053 COMPREHEN METABOLIC PANEL: CPT

## 2024-02-06 ASSESSMENT — ENCOUNTER SYMPTOMS
EYE DISCHARGE: 0
FATIGUE: 0
NECK PAIN: 0
BACK PAIN: 0
NECK STIFFNESS: 0
DIFFICULTY URINATING: 0
BRUISES/BLEEDS EASILY: 0
ARTHRALGIAS: 0
NAUSEA: 0
SORE THROAT: 0
FREQUENCY: 0
DECREASED CONCENTRATION: 0
ABDOMINAL PAIN: 0
RHINORRHEA: 0
EYE ITCHING: 0
DIARRHEA: 0
NUMBNESS: 0
FEVER: 0
ACTIVITY CHANGE: 0
HEADACHES: 0
SINUS PAIN: 0
APPETITE CHANGE: 0
ABDOMINAL DISTENTION: 0
SINUS PRESSURE: 0
NERVOUS/ANXIOUS: 0
CONSTIPATION: 0
WEAKNESS: 0
SLEEP DISTURBANCE: 0
TROUBLE SWALLOWING: 0
COUGH: 0
MYALGIAS: 0
SHORTNESS OF BREATH: 0

## 2024-02-06 NOTE — PROGRESS NOTES
Cardiology  Office Progress Note         Reason for visit:   Chief Complaint   Patient presents with   • Follow-up       HPI     Betina Malhotra is a 68 y.o. female who presents to the office for cardiovascular follow up and management of hypertension and mitral insufficiency.     She was last seen in the office on 24. She had a very faint heart murmur. Her most recent lipid profile was mildly elevated. I asked her to have a coronary calcium score preformed. I made no changes to her medica regimen and follow up in 1 year.     24 CT heart coronary:  -CCS: 751 (LM: 0, LAD: 249, LCX: 29, RCA: 473).  -This placed her in the 95th to 100th percentile for women her age range.     24 FLP: , TG 74, HDL 90, .    Today, she reports tolerating her medications and denies any adverse reactions. She denies any CV complaints.     Past Medical History:   Diagnosis Date   • Anemia    • Arthritis    • Asthma    • Hypertension    • Hypothyroidism    • Murmur    • PONV (postoperative nausea and vomiting)    • Seasonal allergies        Past Surgical History:   Procedure Laterality Date   • BUNIONECTOMY Left    • COLONOSCOPY     • KNEE ARTHROSCOPY Left    • TONSILLECTOMY         Social History     Tobacco Use   • Smoking status: Never   • Smokeless tobacco: Never   Vaping Use   • Vaping Use: Never used   Substance Use Topics   • Alcohol use: Yes     Comment: occasionally   • Drug use: No       Family History   Problem Relation Age of Onset   • Hypertension Biological Mother    • Pancreatic cancer Biological Mother          at age 80   • Pancreatic cancer Biological Father          at age 52   • Heart disease Biological Brother         hx of stents in his 50s   • Hypertension Biological Brother    • No Known Problems Biological Brother    • Hypertension Biological Brother        Allergies:  Dog dander    Current Outpatient Medications   Medication Sig Dispense Refill   • albuterol HFA 90 mcg/actuation  inhaler INHALE 2 PUFFS BY MOUTH EVERY 6 HOURS AS NEEDED FORWHEEZING 18 g 1   • amLODIPine (NORVASC) 5 mg tablet Take 1 tablet (5 mg total) by mouth daily. (Patient taking differently: Take 2.5 mg by mouth daily.) 90 tablet 3   • ARMOUR THYROID 60 mg tablet TAKE 3/4 (0.75) TABLET ONCE A DAY     • budesonide-formoteroL (SYMBICORT) 160-4.5 mcg/actuation inhaler Inhale 2 puffs 2 (two) times a day. Rinse mouth with water after use to reduce aftertaste and incidence of candidiasis. Do not swallow. 10.2 g 3   • calcium carbonate/vitamin D3 (CALTRATE 600 + D ORAL) Take by mouth every evening.       • hydrochlorothiazide 12.5 mg tablet TAKE 1 TABLET BY MOUTH EVERY DAY 90 tablet 1   • losartan (COZAAR) 100 mg tablet TAKE 1 TABLET BY MOUTH EVERY DAY 90 tablet 1   • MAGNESIUM ORAL Take 400 mg by mouth every evening.       • montelukast (SINGULAIR) 10 mg tablet TAKE 1 TABLET BY MOUTH EVERY DAY 90 tablet 0   • rosuvastatin (CRESTOR) 20 mg tablet Take 1 tablet (20 mg total) by mouth daily. 90 tablet 3   • valACYclovir (VALTREX) 1 gram tablet TAKE 1 TABLET BY MOUTH TWICE A DAY FOR 7 DAYS (Patient taking differently: as needed.) 30 tablet 0     No current facility-administered medications for this visit.       Review of Systems   Cardiovascular: Negative for chest pain, dyspnea on exertion, irregular heartbeat, leg swelling, near-syncope, palpitations and syncope.   Neurological: Negative for dizziness and light-headedness.       Objective     Vitals:    02/08/24 1145   BP: 128/78   Pulse: (!) 52   SpO2: 92%       Wt Readings from Last 5 Encounters:   02/08/24 59.4 kg (131 lb)   02/05/24 60.1 kg (132 lb 9.6 oz)   01/30/24 59.9 kg (132 lb)   08/11/23 60.1 kg (132 lb 9.6 oz)   07/20/23 60.8 kg (134 lb)       Body mass index is 23.96 kg/m².    Physical Exam  Vitals and nursing note reviewed.   Constitutional:       General: She is not in acute distress.     Appearance: Normal appearance.   HENT:      Head: Normocephalic.   Eyes:       Extraocular Movements: Extraocular movements intact.   Cardiovascular:      Rate and Rhythm: Regular rhythm. Bradycardia present.      Pulses: Normal pulses.      Heart sounds: Normal heart sounds.   Pulmonary:      Effort: Pulmonary effort is normal. No respiratory distress.      Breath sounds: Normal breath sounds. No wheezing or rales.   Abdominal:      General: There is no distension.      Palpations: Abdomen is soft.   Musculoskeletal:         General: Normal range of motion.      Cervical back: Normal range of motion.      Right lower leg: No edema.      Left lower leg: No edema.   Skin:     General: Skin is warm and dry.      Findings: No rash.   Neurological:      General: No focal deficit present.      Mental Status: She is alert and oriented to person, place, and time.   Psychiatric:         Mood and Affect: Mood normal.         Behavior: Behavior normal.         Thought Content: Thought content normal.         Judgment: Judgment normal.          ECG: Sinus bradycardia, delayed R wave transition-cannot rule out old anterior MI    Hematology  Lab Results   Component Value Date    WBC 4.59 02/06/2024    HGB 12.1 02/06/2024    HCT 38.4 02/06/2024     02/06/2024       Chemistries  Lab Results   Component Value Date     02/06/2024    K 3.9 02/06/2024     02/06/2024    CREATININE 1.0 02/06/2024    BUN 25 02/06/2024    CO2 27 02/06/2024    GLUCOSE 106 (H) 02/06/2024    CALCIUM 9.4 02/06/2024    ALT 14 02/06/2024    AST 23 02/06/2024       Cholesterol  Lab Results   Component Value Date    CHOL 214 (H) 02/06/2024    TRIG 74 02/06/2024    HDL 90 02/06/2024    LDLCALC 109 (H) 02/06/2024    NONHDLCALC 124 02/06/2024       Endocrine  Lab Results   Component Value Date    TSH 0.66 02/06/2024    FREET4 0.84 02/06/2024       Cardiac Imaging    TRANSTHORACIC ECHO (TTE) COMPLETE 03/29/2023    Interpretation Summary  •  Left Ventricle: Normal ventricle size. Normal wall thickness. Normal systolic  function. Estimated EF 60-65%. Wall motion appears grossly normal. Normal diastolic filling pattern for age.  •  Right Ventricle: Normal ventricle size. Normal systolic function.  •  Left Atrium: Normal sized atrium.  •  Right Atrium: Normal sized atrium.  •  Aortic Valve: Tricuspid valve.  Sclerotic leaflets. No regurgitation. No stenosis. Calculated dimensionless index = 0.89.  •  Mitral Valve: Normal leaflet structure. Normal leaflet motion. Mild regurgitation. No stenosis.  •  Tricuspid Valve: Structure is grossly normal. Mild regurgitation. Estimated RVSP = 32 mmHg. No significant stenosis.  •  Pulmonic Valve: Normal structure. Trace regurgitation. No stenosis.  •  Aorta: Aortic root grossly normal. Sinuses of Valsalva grossly normal. Ascending aorta grossly normal. Descending aorta grossly normal. Mild dilatation of the ascending aorta.  •  IVC/SVC: Inferior vena cava is dilated (>2.1cm). Inferior vena cava demonstrates normal respiratory collapse.  •  Pericardium: No evidence of pericardial effusion.                Assessment/Plan     Coronary artery calcification  The patient had significant coronary artery calcification on her coronary calcium score performed on the first day of this month.  I believe that statin therapy is indicated in her case and she has agreed.  She will begin rosuvastatin at 20 mg a day and will have repeat labs performed in about 2 months or so.  She will then follow-up in the office in about 3 months.  We discussed potential side effects of the medication and she understands.  She will contact me with any issues in that regard prior to her upcoming visit.  I made no other changes to her medical regimen today.  Fortunately, she has no symptoms to suggest ischemic heart disease, and her ECG is stable and unremarkable.    Primary hypertension  The patient's blood pressure is well controlled on the current medical regimen.  There are no apparent side effects from medications.  We will  continue the current therapy without change.    Murmur  The patient continues to display a faint systolic murmur.  We are following her clinically.  Echocardiography from February 2023 showed no significant valvular lesions.      New Medications Ordered This Visit   Medications   • rosuvastatin (CRESTOR) 20 mg tablet     Sig: Take 1 tablet (20 mg total) by mouth daily.     Dispense:  90 tablet     Refill:  3       There are no discontinued medications.    Orders Placed This Encounter   Procedures   • Lipid panel     Standing Status:   Future     Standing Expiration Date:   2/8/2025     Order Specific Question:   Release to patient     Answer:   Immediate [1]   • Comprehensive metabolic panel     Standing Status:   Future     Standing Expiration Date:   2/8/2025     Order Specific Question:   Release to patient     Answer:   Immediate [1]   • [facility] MUSE ECG 12 lead (clinic performed)     Scheduling Instructions:      PLEASE USE THIS ORDER FOR ECG'S PERFORMED IN PHYSICIAN OFFICES     Order Specific Question:   Release to patient     Answer:   Immediate [1]       Follow Up Plans:  Return in about 3 months (around 5/8/2024) for Recheck.          ICitlali, am scribing for, and in the presence of, Esteban Veronica MD.    IEsteban MD, personally performed the services described in this documentation as scribed by Citlali Castellon in my presence, and it is both accurate and complete.       Esteban Veronica MD  2/8/2024

## 2024-02-08 ENCOUNTER — OFFICE VISIT (OUTPATIENT)
Dept: CARDIOLOGY | Facility: CLINIC | Age: 69
End: 2024-02-08
Payer: MEDICARE

## 2024-02-08 VITALS
HEIGHT: 62 IN | OXYGEN SATURATION: 92 % | BODY MASS INDEX: 24.11 KG/M2 | DIASTOLIC BLOOD PRESSURE: 78 MMHG | WEIGHT: 131 LBS | HEART RATE: 52 BPM | SYSTOLIC BLOOD PRESSURE: 128 MMHG

## 2024-02-08 DIAGNOSIS — R01.1 MURMUR: ICD-10-CM

## 2024-02-08 DIAGNOSIS — I10 PRIMARY HYPERTENSION: ICD-10-CM

## 2024-02-08 DIAGNOSIS — I25.10 CORONARY ARTERY CALCIFICATION: ICD-10-CM

## 2024-02-08 DIAGNOSIS — E78.2 MIXED HYPERLIPIDEMIA: Primary | ICD-10-CM

## 2024-02-08 LAB
ATRIAL RATE: 59
P AXIS: 73
PR INTERVAL: 208
QRS DURATION: 76
QT INTERVAL: 448
QTC CALCULATION(BAZETT): 443
R AXIS: 72
T WAVE AXIS: 73
VENTRICULAR RATE: 59

## 2024-02-08 PROCEDURE — G8752 SYS BP LESS 140: HCPCS | Performed by: INTERNAL MEDICINE

## 2024-02-08 PROCEDURE — 99214 OFFICE O/P EST MOD 30 MIN: CPT | Performed by: INTERNAL MEDICINE

## 2024-02-08 PROCEDURE — 93000 ELECTROCARDIOGRAM COMPLETE: CPT | Performed by: INTERNAL MEDICINE

## 2024-02-08 PROCEDURE — G8754 DIAS BP LESS 90: HCPCS | Performed by: INTERNAL MEDICINE

## 2024-02-08 RX ORDER — ROSUVASTATIN CALCIUM 20 MG/1
20 TABLET, COATED ORAL DAILY
Qty: 90 TABLET | Refills: 3 | Status: SHIPPED | OUTPATIENT
Start: 2024-02-08 | End: 2025-02-10

## 2024-02-08 ASSESSMENT — ENCOUNTER SYMPTOMS
DYSPNEA ON EXERTION: 0
SYNCOPE: 0
NEAR-SYNCOPE: 0
DIZZINESS: 0
LIGHT-HEADEDNESS: 0
IRREGULAR HEARTBEAT: 0
PALPITATIONS: 0

## 2024-02-08 NOTE — ASSESSMENT & PLAN NOTE
The patient had significant coronary artery calcification on her coronary calcium score performed on the first day of this month.  I believe that statin therapy is indicated in her case and she has agreed.  She will begin rosuvastatin at 20 mg a day and will have repeat labs performed in about 2 months or so.  She will then follow-up in the office in about 3 months.  We discussed potential side effects of the medication and she understands.  She will contact me with any issues in that regard prior to her upcoming visit.  I made no other changes to her medical regimen today.  Fortunately, she has no symptoms to suggest ischemic heart disease, and her ECG is stable and unremarkable.

## 2024-02-08 NOTE — LETTER
February 8, 2024     TERESA Torres  [address] [facility]  [address]  [place] [address]    Patient: Betina Malhotra  YOB: 1955  Date of Visit: 2/8/2024      Dear Dr. Paz:    Thank you for referring Betina Malhotra to me for evaluation. Below are my notes for this consultation.    If you have questions, please do not hesitate to call me. I look forward to following your patient along with you.         Sincerely,        Esteban Veronica MD        CC: No Recipients    Esteban Veronica MD  2/8/2024  1:00 PM  Signed       Cardiology  Office Progress Note         Reason for visit:   Chief Complaint   Patient presents with   • Follow-up       HPI     Betina Malhotra is a 68 y.o. female who presents to the office for cardiovascular follow up and management of hypertension and mitral insufficiency.     She was last seen in the office on 1/30/24. She had a very faint heart murmur. Her most recent lipid profile was mildly elevated. I asked her to have a coronary calcium score preformed. I made no changes to her medica regimen and follow up in 1 year.     2/1/24 CT heart coronary:  -CCS: 751 (LM: 0, LAD: 249, LCX: 29, RCA: 473).  -This placed her in the 95th to 100th percentile for women her age range.     2/6/24 FLP: , TG 74, HDL 90, .    Today, she reports tolerating her medications and denies any adverse reactions. She denies any CV complaints.     Past Medical History:   Diagnosis Date   • Anemia    • Arthritis    • Asthma    • Hypertension    • Hypothyroidism    • Murmur    • PONV (postoperative nausea and vomiting)    • Seasonal allergies        Past Surgical History:   Procedure Laterality Date   • BUNIONECTOMY Left    • COLONOSCOPY     • KNEE ARTHROSCOPY Left    • TONSILLECTOMY         Social History     Tobacco Use   • Smoking status: Never   • Smokeless tobacco: Never   Vaping Use   • Vaping Use: Never used   Substance Use Topics   • Alcohol use: Yes     Comment:  occasionally   • Drug use: No       Family History   Problem Relation Age of Onset   • Hypertension Biological Mother    • Pancreatic cancer Biological Mother          at age 80   • Pancreatic cancer Biological Father          at age 52   • Heart disease Biological Brother         hx of stents in his 50s   • Hypertension Biological Brother    • No Known Problems Biological Brother    • Hypertension Biological Brother        Allergies:  Dog dander    Current Outpatient Medications   Medication Sig Dispense Refill   • albuterol HFA 90 mcg/actuation inhaler INHALE 2 PUFFS BY MOUTH EVERY 6 HOURS AS NEEDED FORWHEEZING 18 g 1   • amLODIPine (NORVASC) 5 mg tablet Take 1 tablet (5 mg total) by mouth daily. (Patient taking differently: Take 2.5 mg by mouth daily.) 90 tablet 3   • ARMOUR THYROID 60 mg tablet TAKE 3/4 (0.75) TABLET ONCE A DAY     • budesonide-formoteroL (SYMBICORT) 160-4.5 mcg/actuation inhaler Inhale 2 puffs 2 (two) times a day. Rinse mouth with water after use to reduce aftertaste and incidence of candidiasis. Do not swallow. 10.2 g 3   • calcium carbonate/vitamin D3 (CALTRATE 600 + D ORAL) Take by mouth every evening.       • hydrochlorothiazide 12.5 mg tablet TAKE 1 TABLET BY MOUTH EVERY DAY 90 tablet 1   • losartan (COZAAR) 100 mg tablet TAKE 1 TABLET BY MOUTH EVERY DAY 90 tablet 1   • MAGNESIUM ORAL Take 400 mg by mouth every evening.       • montelukast (SINGULAIR) 10 mg tablet TAKE 1 TABLET BY MOUTH EVERY DAY 90 tablet 0   • rosuvastatin (CRESTOR) 20 mg tablet Take 1 tablet (20 mg total) by mouth daily. 90 tablet 3   • valACYclovir (VALTREX) 1 gram tablet TAKE 1 TABLET BY MOUTH TWICE A DAY FOR 7 DAYS (Patient taking differently: as needed.) 30 tablet 0     No current facility-administered medications for this visit.       Review of Systems   Cardiovascular: Negative for chest pain, dyspnea on exertion, irregular heartbeat, leg swelling, near-syncope, palpitations and syncope.   Neurological:  Negative for dizziness and light-headedness.       Objective     Vitals:    02/08/24 1145   BP: 128/78   Pulse: (!) 52   SpO2: 92%       Wt Readings from Last 5 Encounters:   02/08/24 59.4 kg (131 lb)   02/05/24 60.1 kg (132 lb 9.6 oz)   01/30/24 59.9 kg (132 lb)   08/11/23 60.1 kg (132 lb 9.6 oz)   07/20/23 60.8 kg (134 lb)       Body mass index is 23.96 kg/m².    Physical Exam  Vitals and nursing note reviewed.   Constitutional:       General: She is not in acute distress.     Appearance: Normal appearance.   HENT:      Head: Normocephalic.   Eyes:      Extraocular Movements: Extraocular movements intact.   Cardiovascular:      Rate and Rhythm: Regular rhythm. Bradycardia present.      Pulses: Normal pulses.      Heart sounds: Normal heart sounds.   Pulmonary:      Effort: Pulmonary effort is normal. No respiratory distress.      Breath sounds: Normal breath sounds. No wheezing or rales.   Abdominal:      General: There is no distension.      Palpations: Abdomen is soft.   Musculoskeletal:         General: Normal range of motion.      Cervical back: Normal range of motion.      Right lower leg: No edema.      Left lower leg: No edema.   Skin:     General: Skin is warm and dry.      Findings: No rash.   Neurological:      General: No focal deficit present.      Mental Status: She is alert and oriented to person, place, and time.   Psychiatric:         Mood and Affect: Mood normal.         Behavior: Behavior normal.         Thought Content: Thought content normal.         Judgment: Judgment normal.          ECG: Sinus bradycardia, delayed R wave transition-cannot rule out old anterior MI    Hematology  Lab Results   Component Value Date    WBC 4.59 02/06/2024    HGB 12.1 02/06/2024    HCT 38.4 02/06/2024     02/06/2024       Chemistries  Lab Results   Component Value Date     02/06/2024    K 3.9 02/06/2024     02/06/2024    CREATININE 1.0 02/06/2024    BUN 25 02/06/2024    CO2 27 02/06/2024     GLUCOSE 106 (H) 02/06/2024    CALCIUM 9.4 02/06/2024    ALT 14 02/06/2024    AST 23 02/06/2024       Cholesterol  Lab Results   Component Value Date    CHOL 214 (H) 02/06/2024    TRIG 74 02/06/2024    HDL 90 02/06/2024    LDLCALC 109 (H) 02/06/2024    NONHDLCALC 124 02/06/2024       Endocrine  Lab Results   Component Value Date    TSH 0.66 02/06/2024    FREET4 0.84 02/06/2024       Cardiac Imaging    TRANSTHORACIC ECHO (TTE) COMPLETE 03/29/2023    Interpretation Summary  •  Left Ventricle: Normal ventricle size. Normal wall thickness. Normal systolic function. Estimated EF 60-65%. Wall motion appears grossly normal. Normal diastolic filling pattern for age.  •  Right Ventricle: Normal ventricle size. Normal systolic function.  •  Left Atrium: Normal sized atrium.  •  Right Atrium: Normal sized atrium.  •  Aortic Valve: Tricuspid valve.  Sclerotic leaflets. No regurgitation. No stenosis. Calculated dimensionless index = 0.89.  •  Mitral Valve: Normal leaflet structure. Normal leaflet motion. Mild regurgitation. No stenosis.  •  Tricuspid Valve: Structure is grossly normal. Mild regurgitation. Estimated RVSP = 32 mmHg. No significant stenosis.  •  Pulmonic Valve: Normal structure. Trace regurgitation. No stenosis.  •  Aorta: Aortic root grossly normal. Sinuses of Valsalva grossly normal. Ascending aorta grossly normal. Descending aorta grossly normal. Mild dilatation of the ascending aorta.  •  IVC/SVC: Inferior vena cava is dilated (>2.1cm). Inferior vena cava demonstrates normal respiratory collapse.  •  Pericardium: No evidence of pericardial effusion.                Assessment/Plan     Coronary artery calcification  The patient had significant coronary artery calcification on her coronary calcium score performed on the first day of this month.  I believe that statin therapy is indicated in her case and she has agreed.  She will begin rosuvastatin at 20 mg a day and will have repeat labs performed in about 2 months  or so.  She will then follow-up in the office in about 3 months.  We discussed potential side effects of the medication and she understands.  She will contact me with any issues in that regard prior to her upcoming visit.  I made no other changes to her medical regimen today.  Fortunately, she has no symptoms to suggest ischemic heart disease, and her ECG is stable and unremarkable.    Primary hypertension  The patient's blood pressure is well controlled on the current medical regimen.  There are no apparent side effects from medications.  We will continue the current therapy without change.    Murmur  The patient continues to display a faint systolic murmur.  We are following her clinically.  Echocardiography from February 2023 showed no significant valvular lesions.      New Medications Ordered This Visit   Medications   • rosuvastatin (CRESTOR) 20 mg tablet     Sig: Take 1 tablet (20 mg total) by mouth daily.     Dispense:  90 tablet     Refill:  3       There are no discontinued medications.    Orders Placed This Encounter   Procedures   • Lipid panel     Standing Status:   Future     Standing Expiration Date:   2/8/2025     Order Specific Question:   Release to patient     Answer:   Immediate [1]   • Comprehensive metabolic panel     Standing Status:   Future     Standing Expiration Date:   2/8/2025     Order Specific Question:   Release to patient     Answer:   Immediate [1]   • [facility] MUSE ECG 12 lead (clinic performed)     Scheduling Instructions:      PLEASE USE THIS ORDER FOR ECG'S PERFORMED IN PHYSICIAN OFFICES     Order Specific Question:   Release to patient     Answer:   Immediate [1]       Follow Up Plans:  Return in about 3 months (around 5/8/2024) for Recheck.         ICitlali, am scribing for, and in the presence of, Esteban Veronica MD.    Esteban NUNO MD, personally performed the services described in this documentation as scribed by Citlali Castellon in my presence, and it  is both accurate and complete.       Esteban Veronica MD  2/8/2024

## 2024-02-08 NOTE — ASSESSMENT & PLAN NOTE
HPI:  Kimberly Stevenson is a 35 y.o. female presenting for well woman exam.     Concerns today:  · No acute complains today  · She was seen in the [facility] ER 6 months ago for back pain and was diagnosed with nephrolithiasis. She was given \"some pain medications\" and her symptoms improved. She did not recall passing the stone. No hematuria. [id number]. He youngest child is 12 months old. She is still breastfeeding. Has not have periods yet since the delivery    Sexually active?: Yes  Number of sexual partners: 1  Type of sexual partners: male  Method of family planning: Micronor     Diet: Well balanced, she is trying to loose weight, she eats small frequent  portions     Exercise: Doing yoga every day       Allergies- reviewed:   No Known Allergies      Medications- reviewed:   Current Outpatient Prescriptions   Medication Sig    norethindrone (MICRONOR) 0.35 mg tab Take 1 Tab by mouth daily.  ibuprofen (MOTRIN) 600 mg tablet Take 1 Tab by mouth every six (6) hours as needed for Pain. Indications: Pain     No current facility-administered medications for this visit. Past Medical History- reviewed:  Past Medical History:   Diagnosis Date    Irregular menses     PCOS (polycystic ovarian syndrome)          Past Surgical History- reviewed:   History reviewed. No pertinent surgical history. Family History - reviewed:  Family History   Problem Relation Age of Onset    Hypertension Mother     Stroke Mother     Other Mother      Sturge Radford Syndrome    Asthma Father     Elevated Lipids Father     Other Sister      scolosis         Social History - reviewed:  Social History     Social History    Marital status:      Spouse name: N/A    Number of children: N/A    Years of education: N/A     Occupational History    Not on file.      Social History Main Topics    Smoking status: Never Smoker    Smokeless tobacco: Never Used    Alcohol use No    Drug use: No    Sexual activity: The patient continues to display a faint systolic murmur.  We are following her clinically.  Echocardiography from February 2023 showed no significant valvular lesions.   Yes     Partners: Male     Other Topics Concern    Not on file     Social History Narrative         Immunizations - reviewed:   Immunization History   Administered Date(s) Administered    Influenza Vaccine (Quad) PF 09/05/2017, 10/16/2018    Tdap 07/11/2017     Flu: Due today         Health Maintenance reviewed -  Pap smear Last one 2/10/2017 _negative for IEL  Mammogram Not indicated  Colonoscopy Not indicated        Review of Systems   CONSTITUTIONAL: Denies: fever, chills  EYES: Denies: blurry vision, decreased vision  ENT: Denies: sore throat, nasal congestion  CARDIOVASCULAR: Denies: chest pain, dyspnea on exertion  RESPIRATORY: Denies: cough, hemoptysis, shortness of breath  ENDOCRINE: Denies: polydipsia/polyuria, skin changes  GI: Denies: abdominal pain, nausea, vomiting  : Denies: dysuria, frequency/urgency, genital discharge, vaginal bleeding  NEURO: Denies: dizzy/vertigo, headache  MUSCULOSKELETAL: Denies: back pain, joint pain  SKIN: Denies: rash, itching  PSYCH: Denies: anxiety, depression  BREASTS: No masses or nipple discharge      Physical Exam  Visit Vitals    BP 99/63    Pulse 65    Temp 98.3 °F (36.8 °C) (Oral)    Resp 16    Ht 5' 4\" (1.626 m)    Wt 162 lb (73.5 kg)    BMI 27.81 kg/m2       General appearance - alert, well appearing, and in no distress  Eyes - pupils equal and reactive, extraocular eye movements intact  Ears - bilateral TM's and external ear canals normal  Nose - normal and patent, no erythema, discharge or polyps  Mouth - mucous membranes moist, pharynx normal without lesions  Neck - supple, no significant adenopathy  Chest - clear to auscultation, no wheezes, rales or rhonchi, symmetric air entry  Heart - normal rate, regular rhythm, normal S1, S2, no murmurs, rubs, clicks or gallops  Abdomen - soft, nontender, nondistended, no masses or organomegaly  Neurological - alert, oriented, normal speech, no focal findings or movement disorder noted  Musculoskeletal - no joint tenderness, deformity or swelling  Extremities - peripheral pulses normal, no pedal edema, no clubbing or cyanosis  Skin - normal coloration and turgor, no rashes, no suspicious skin lesions noted    Assessment/Plan:  34 yo female who is here for:     ICD-10-CM ICD-9-CM    1. Well woman exam Z01.419 V72.31 CBC W/O DIFF      METABOLIC PANEL, COMPREHENSIVE      AMB POC URINE PREGNANCY TEST, VISUAL COLOR COMPARISON   2. Encounter for immunization Z23 V03.89 INFLUENZA VIRUS VAC QUAD,SPLIT,PRESV FREE SYRINGE IM      MO IMMUNIZ ADMIN,1 SINGLE/COMB VAC/TOXOID         · Counseled re: diet, exercise, healthy lifestyle    · Appropriate labs, vaccines, imaging studies, and referrals ordered as listed above      · The patient was counseled on the dangers of tobacco use, and was advised to quit. Reviewed strategies to maximize success, including written materials. Follow-up Disposition:  Return if symptoms worsen or fail to improve. I have discussed the diagnosis with the patient and the intended plan as seen in the above orders. Patient verbalized understanding of the plan and agrees with the plan. The patient has received an after-visit summary and questions were answered concerning future plans. I have discussed medication side effects and warnings with the patient as well. Informed patient to return to the office if new symptoms arise.         Giovanni Piper MD  Family Medicine Resident, PGY-3

## 2024-03-14 ENCOUNTER — APPOINTMENT (OUTPATIENT)
Dept: URBAN - METROPOLITAN AREA CLINIC 203 | Age: 69
Setting detail: DERMATOLOGY
End: 2024-03-18

## 2024-03-14 DIAGNOSIS — L90.8 OTHER ATROPHIC DISORDERS OF SKIN: ICD-10-CM

## 2024-03-14 DIAGNOSIS — L30.9 DERMATITIS, UNSPECIFIED: ICD-10-CM

## 2024-03-14 PROCEDURE — 99203 OFFICE O/P NEW LOW 30 MIN: CPT

## 2024-03-14 PROCEDURE — OTHER PRESCRIPTION: OTHER

## 2024-03-14 PROCEDURE — OTHER PRESCRIPTION MEDICATION MANAGEMENT: OTHER

## 2024-03-14 RX ORDER — METRONIDAZOLE 7.5 MG/ML
LOTION TOPICAL
Qty: 59 | Refills: 2 | Status: ERX | COMMUNITY
Start: 2024-03-14

## 2024-03-14 ASSESSMENT — PAIN INTENSITY VAS: HOW INTENSE IS YOUR PAIN 0 BEING NO PAIN, 10 BEING THE MOST SEVERE PAIN POSSIBLE?: NO PAIN

## 2024-03-14 ASSESSMENT — SEVERITY ASSESSMENT: SEVERITY: MILD

## 2024-03-14 ASSESSMENT — LOCATION DETAILED DESCRIPTION DERM
LOCATION DETAILED: LEFT MEDIAL MALAR CHEEK
LOCATION DETAILED: RIGHT MEDIAL MALAR CHEEK
LOCATION DETAILED: LEFT INFERIOR MEDIAL MALAR CHEEK

## 2024-03-14 ASSESSMENT — LOCATION ZONE DERM: LOCATION ZONE: FACE

## 2024-03-14 ASSESSMENT — LOCATION SIMPLE DESCRIPTION DERM
LOCATION SIMPLE: LEFT CHEEK
LOCATION SIMPLE: RIGHT CHEEK

## 2024-03-14 ASSESSMENT — BSA RASH: BSA RASH: 1

## 2024-03-14 ASSESSMENT — ITCH NUMERIC RATING SCALE: HOW SEVERE IS YOUR ITCHING?: 3

## 2024-03-15 RX ORDER — METRONIDAZOLE 7.5 MG/ML
LOTION TOPICAL
Qty: 59 | Refills: 0 | Status: ERX

## 2024-04-25 ENCOUNTER — APPOINTMENT (OUTPATIENT)
Dept: LAB | Age: 69
End: 2024-04-25
Attending: NURSE PRACTITIONER
Payer: MEDICARE

## 2024-04-25 DIAGNOSIS — I10 PRIMARY HYPERTENSION: ICD-10-CM

## 2024-04-25 DIAGNOSIS — E78.2 MIXED HYPERLIPIDEMIA: ICD-10-CM

## 2024-04-25 LAB
ALBUMIN SERPL-MCNC: 4.7 G/DL (ref 3.5–5.7)
ALP SERPL-CCNC: 38 IU/L (ref 34–125)
ALT SERPL-CCNC: 29 IU/L (ref 7–52)
ANION GAP SERPL CALC-SCNC: 8 MEQ/L (ref 3–15)
AST SERPL-CCNC: 39 IU/L (ref 13–39)
BILIRUB SERPL-MCNC: 1.1 MG/DL (ref 0.3–1.2)
BUN SERPL-MCNC: 32 MG/DL (ref 7–25)
CALCIUM SERPL-MCNC: 9.2 MG/DL (ref 8.6–10.3)
CHLORIDE SERPL-SCNC: 103 MEQ/L (ref 98–107)
CHOLEST SERPL-MCNC: 177 MG/DL
CO2 SERPL-SCNC: 29 MEQ/L (ref 21–31)
CREAT SERPL-MCNC: 1.1 MG/DL (ref 0.6–1.2)
EGFRCR SERPLBLD CKD-EPI 2021: 54.8 ML/MIN/1.73M*2
GLUCOSE SERPL-MCNC: 92 MG/DL (ref 70–99)
HDLC SERPL-MCNC: 89 MG/DL
HDLC SERPL: 2 {RATIO}
LDLC SERPL CALC-MCNC: 71 MG/DL
NONHDLC SERPL-MCNC: 88 MG/DL
POTASSIUM SERPL-SCNC: 4.3 MEQ/L (ref 3.5–5.1)
PROT SERPL-MCNC: 6.5 G/DL (ref 6–8.2)
SODIUM SERPL-SCNC: 140 MEQ/L (ref 136–145)
TRIGL SERPL-MCNC: 87 MG/DL

## 2024-04-25 PROCEDURE — 36415 COLL VENOUS BLD VENIPUNCTURE: CPT

## 2024-04-25 PROCEDURE — 80061 LIPID PANEL: CPT

## 2024-04-25 PROCEDURE — 80053 COMPREHEN METABOLIC PANEL: CPT

## 2024-04-26 PROBLEM — R93.1 AGATSTON CORONARY ARTERY CALCIUM SCORE GREATER THAN 400: Status: ACTIVE | Noted: 2024-02-08

## 2024-04-26 NOTE — PROGRESS NOTES
Cardiology  Office Progress Note         Reason for visit:   Chief Complaint   Patient presents with    Follow-up       HPI     Betina Malhotra is a 68 y.o. female who presents to the office for cardiovascular follow up and management of CAD (CACS 24 - 751:  LM 0, , LCX 29, ), hypertension and mitral insufficiency.      She was last seen in the office on 24.  At that visit I started her on Rosuvastatin 20 mg daily and asked her to have labs in 2 months and to follow up in 3 months.    She notes that she started having aches and pain in her shoulders and upper back after starting the Crestor 20mg daily but have been gradually improving.      She continues to report mild shortness of breath climbing stairs, especially in the evening.    She denies any chest pain, edema, palpitations, near syncope or syncope.      FLP 24:  , TG 87, HDL 89, LDL 71  FLP 24:  , TG 74, HDL 90,       Past Medical History:   Diagnosis Date    Anemia     Arthritis     Asthma     Coronary artery disease     CACS 24 - 751:  LM 0, , LCX 29,     Hypertension     Hypothyroidism     Murmur     PONV (postoperative nausea and vomiting)     Seasonal allergies        Past Surgical History:   Procedure Laterality Date    BUNIONECTOMY Left     COLONOSCOPY      KNEE ARTHROSCOPY Left     TONSILLECTOMY         Social History     Tobacco Use    Smoking status: Never    Smokeless tobacco: Never   Vaping Use    Vaping Use: Never used   Substance Use Topics    Alcohol use: Yes     Comment: occasionally    Drug use: No       Family History   Problem Relation Age of Onset    Hypertension Biological Mother     Pancreatic cancer Biological Mother          at age 80    Pancreatic cancer Biological Father          at age 52    Heart disease Biological Brother         hx of stents in his 50s    Hypertension Biological Brother     No Known Problems Biological Brother     Hypertension  Biological Brother        Allergies:  Dog dander    Current Outpatient Medications   Medication Sig Dispense Refill    albuterol HFA 90 mcg/actuation inhaler INHALE 2 PUFFS BY MOUTH EVERY 6 HOURS AS NEEDED FORWHEEZING 18 g 1    amLODIPine (NORVASC) 5 mg tablet Take 1 tablet (5 mg total) by mouth daily. (Patient taking differently: Take 2.5 mg by mouth daily.) 90 tablet 3    ARMOUR THYROID 60 mg tablet TAKE 3/4 (0.75) TABLET ONCE A DAY      budesonide-formoteroL (SYMBICORT) 160-4.5 mcg/actuation inhaler Inhale 2 puffs 2 (two) times a day. Rinse mouth with water after use to reduce aftertaste and incidence of candidiasis. Do not swallow. 10.2 g 3    hydrochlorothiazide 12.5 mg tablet TAKE 1 TABLET BY MOUTH EVERY DAY 90 tablet 1    losartan (COZAAR) 100 mg tablet TAKE 1 TABLET BY MOUTH EVERY DAY 90 tablet 1    MAGNESIUM ORAL Take 400 mg by mouth every evening.        montelukast (SINGULAIR) 10 mg tablet TAKE 1 TABLET BY MOUTH EVERY DAY 90 tablet 0    rosuvastatin (CRESTOR) 20 mg tablet Take 1 tablet (20 mg total) by mouth daily. 90 tablet 3    valACYclovir (VALTREX) 1 gram tablet TAKE 1 TABLET BY MOUTH TWICE A DAY FOR 7 DAYS (Patient taking differently: as needed.) 30 tablet 0    calcium carbonate/vitamin D3 (CALTRATE 600 + D ORAL) Take by mouth every evening.         No current facility-administered medications for this visit.       Review of Systems   Constitutional: Positive for malaise/fatigue.   Cardiovascular:  Positive for dyspnea on exertion. Negative for chest pain, irregular heartbeat, leg swelling, near-syncope, orthopnea, palpitations and syncope.   Hematologic/Lymphatic: Does not bruise/bleed easily.   Musculoskeletal:  Positive for myalgias.   Neurological:  Positive for light-headedness (w/change in position). Negative for dizziness.   All other systems reviewed and are negative.      Objective     Vitals:    04/30/24 1229   BP: 120/74   Pulse: (!) 54   SpO2: 94%       Wt Readings from Last 5 Encounters:    04/30/24 59.9 kg (132 lb)   02/08/24 59.4 kg (131 lb)   02/05/24 60.1 kg (132 lb 9.6 oz)   01/30/24 59.9 kg (132 lb)   08/11/23 60.1 kg (132 lb 9.6 oz)       Body mass index is 24.14 kg/m².    Physical Exam  Vitals and nursing note reviewed.   Constitutional:       General: She is not in acute distress.     Appearance: Normal appearance.   HENT:      Head: Normocephalic.   Eyes:      Extraocular Movements: Extraocular movements intact.   Cardiovascular:      Rate and Rhythm: Regular rhythm. Bradycardia present.      Pulses: Normal pulses.      Heart sounds: Normal heart sounds.   Pulmonary:      Effort: Pulmonary effort is normal. No respiratory distress.      Breath sounds: Normal breath sounds. No wheezing or rales.   Abdominal:      General: There is no distension.      Palpations: Abdomen is soft.   Musculoskeletal:         General: Normal range of motion.      Cervical back: Normal range of motion.      Right lower leg: No edema.      Left lower leg: No edema.   Skin:     General: Skin is warm and dry.      Findings: No rash.   Neurological:      General: No focal deficit present.      Mental Status: She is alert and oriented to person, place, and time.   Psychiatric:         Mood and Affect: Mood normal.         Behavior: Behavior normal.         Thought Content: Thought content normal.         Judgment: Judgment normal.          ECG: Normal sinus rhythm, possible old anterior MI versus lead placement artifact    Hematology  Lab Results   Component Value Date    WBC 4.59 02/06/2024    HGB 12.1 02/06/2024    HCT 38.4 02/06/2024     02/06/2024       Chemistries  Lab Results   Component Value Date     04/25/2024    K 4.3 04/25/2024     04/25/2024    CREATININE 1.1 04/25/2024    BUN 32 (H) 04/25/2024    CO2 29 04/25/2024    GLUCOSE 92 04/25/2024    CALCIUM 9.2 04/25/2024    ALT 29 04/25/2024    AST 39 04/25/2024       Cholesterol  Lab Results   Component Value Date    CHOL 177 04/25/2024     TRIG 87 04/25/2024    HDL 89 04/25/2024    LDLCALC 71 04/25/2024    NONHDLCALC 88 04/25/2024       Endocrine  Lab Results   Component Value Date    TSH 0.66 02/06/2024    FREET4 0.84 02/06/2024       Cardiac Imaging    TRANSTHORACIC ECHO (TTE) COMPLETE 03/29/2023    Interpretation Summary    Left Ventricle: Normal ventricle size. Normal wall thickness. Normal systolic function. Estimated EF 60-65%. Wall motion appears grossly normal. Normal diastolic filling pattern for age.    Right Ventricle: Normal ventricle size. Normal systolic function.    Left Atrium: Normal sized atrium.    Right Atrium: Normal sized atrium.    Aortic Valve: Tricuspid valve.  Sclerotic leaflets. No regurgitation. No stenosis. Calculated dimensionless index = 0.89.    Mitral Valve: Normal leaflet structure. Normal leaflet motion. Mild regurgitation. No stenosis.    Tricuspid Valve: Structure is grossly normal. Mild regurgitation. Estimated RVSP = 32 mmHg. No significant stenosis.    Pulmonic Valve: Normal structure. Trace regurgitation. No stenosis.    Aorta: Aortic root grossly normal. Sinuses of Valsalva grossly normal. Ascending aorta grossly normal. Descending aorta grossly normal. Mild dilatation of the ascending aorta.    IVC/SVC: Inferior vena cava is dilated (>2.1cm). Inferior vena cava demonstrates normal respiratory collapse.    Pericardium: No evidence of pericardial effusion.                Assessment/Plan     Primary hypertension  The patient's blood pressure is well-controlled at that point.  She does have some transient orthostatic lightheadedness which is likely related to her hypertension medications.  She will make sure that she is careful in this regard.  I made no changes to her regimen today in this regard.    Pure hypercholesterolemia  The patient's lipid profile is responded well to statin therapy.  She has developed some upper back discomfort which is persistent and may be a manifestation of her statin therapy.  She  will try coenzyme every 10 at 100 to 200 mg a day for a few weeks to see if it helps.  If so, she will continue that supplement.  If not, she will try stopping her statin to see if this symptom improves.  If so, we will give her an alternative statin to try.  I do believe it is important that we treat her lipids because of her coronary calcium score.    Murmur  We are following her clinically.  She has no significant murmur on examination to suggest significant valvular heart disease.      No orders of the defined types were placed in this encounter.      There are no discontinued medications.    Orders Placed This Encounter   Procedures    [facility] MUSE ECG 12 lead (clinic performed)     Scheduling Instructions:      PLEASE USE THIS ORDER FOR ECG'S PERFORMED IN PHYSICIAN OFFICES     Order Specific Question:   Release to patient     Answer:   Immediate [1]       Follow Up Plans:  Return in about 6 months (around 10/30/2024).          I, Leslie Avila, am scribing for, and in the presence of, Esteban Veronica MD.    IEstebna MD, personally performed the services described in this documentation as scribed by Leslie Avila in my presence, and it is both accurate and complete.       Esteban Veronica MD  4/30/2024

## 2024-04-30 ENCOUNTER — OFFICE VISIT (OUTPATIENT)
Dept: CARDIOLOGY | Facility: CLINIC | Age: 69
End: 2024-04-30
Payer: MEDICARE

## 2024-04-30 VITALS
WEIGHT: 132 LBS | SYSTOLIC BLOOD PRESSURE: 120 MMHG | HEIGHT: 62 IN | BODY MASS INDEX: 24.29 KG/M2 | HEART RATE: 54 BPM | OXYGEN SATURATION: 94 % | DIASTOLIC BLOOD PRESSURE: 74 MMHG

## 2024-04-30 DIAGNOSIS — R93.1 AGATSTON CORONARY ARTERY CALCIUM SCORE GREATER THAN 400: Primary | ICD-10-CM

## 2024-04-30 DIAGNOSIS — I10 PRIMARY HYPERTENSION: ICD-10-CM

## 2024-04-30 DIAGNOSIS — R01.1 MURMUR: ICD-10-CM

## 2024-04-30 DIAGNOSIS — E78.00 PURE HYPERCHOLESTEROLEMIA: ICD-10-CM

## 2024-04-30 LAB
ATRIAL RATE: 71
P AXIS: 55
PR INTERVAL: 196
QRS DURATION: 80
QT INTERVAL: 388
QTC CALCULATION(BAZETT): 421
R AXIS: 20
T WAVE AXIS: 69
VENTRICULAR RATE: 71

## 2024-04-30 PROCEDURE — G8754 DIAS BP LESS 90: HCPCS | Performed by: INTERNAL MEDICINE

## 2024-04-30 PROCEDURE — G8752 SYS BP LESS 140: HCPCS | Performed by: INTERNAL MEDICINE

## 2024-04-30 PROCEDURE — 99214 OFFICE O/P EST MOD 30 MIN: CPT | Performed by: INTERNAL MEDICINE

## 2024-04-30 PROCEDURE — 93000 ELECTROCARDIOGRAM COMPLETE: CPT | Performed by: INTERNAL MEDICINE

## 2024-04-30 ASSESSMENT — ENCOUNTER SYMPTOMS
DYSPNEA ON EXERTION: 1
MYALGIAS: 1
NEAR-SYNCOPE: 0
PALPITATIONS: 0
IRREGULAR HEARTBEAT: 0
SYNCOPE: 0
LIGHT-HEADEDNESS: 1
BRUISES/BLEEDS EASILY: 0
DIZZINESS: 0
ORTHOPNEA: 0

## 2024-04-30 NOTE — ASSESSMENT & PLAN NOTE
The patient's blood pressure is well-controlled at that point.  She does have some transient orthostatic lightheadedness which is likely related to her hypertension medications.  She will make sure that she is careful in this regard.  I made no changes to her regimen today in this regard.

## 2024-04-30 NOTE — ASSESSMENT & PLAN NOTE
The patient's lipid profile is responded well to statin therapy.  She has developed some upper back discomfort which is persistent and may be a manifestation of her statin therapy.  She will try coenzyme every 10 at 100 to 200 mg a day for a few weeks to see if it helps.  If so, she will continue that supplement.  If not, she will try stopping her statin to see if this symptom improves.  If so, we will give her an alternative statin to try.  I do believe it is important that we treat her lipids because of her coronary calcium score.

## 2024-04-30 NOTE — LETTER
April 30, 2024     TERESA Torres  [address]  [address]  [place] [id number]    Patient: Betina Malhotra  YOB: 1955  Date of Visit: 4/30/2024      Dear Dr. Paz:    Thank you for referring Betina Malhotra to me for evaluation. Below are my notes for this consultation.    If you have questions, please do not hesitate to call me. I look forward to following your patient along with you.         Sincerely,        Esteban Veronica MD        CC: No Recipients    Esteban Veronica MD  4/30/2024  1:08 PM  Sign when Signing Visit       Cardiology  Office Progress Note         Reason for visit:   Chief Complaint   Patient presents with   • Follow-up       HPI    Betina Malhotra is a 68 y.o. female who presents to the office for cardiovascular follow up and management of CAD (CACS 2/1/24 - 751:  LM 0, , LCX 29, ), hypertension and mitral insufficiency.      She was last seen in the office on 2/8/24.  At that visit I started her on Rosuvastatin 20 mg daily and asked her to have labs in 2 months and to follow up in 3 months.    She notes that she started having aches and pain in her shoulders and upper back after starting the Crestor 20mg daily but have been gradually improving.      She continues to report mild shortness of breath climbing stairs, especially in the evening.    She denies any chest pain, edema, palpitations, near syncope or syncope.      FLP 4/25/24:  , TG 87, HDL 89, LDL 71  FLP 2/6/24:  , TG 74, HDL 90,       Past Medical History:   Diagnosis Date   • Anemia    • Arthritis    • Asthma    • Coronary artery disease     CACS 2/1/24 - 751:  LM 0, , LCX 29,    • Hypertension    • Hypothyroidism    • Murmur    • PONV (postoperative nausea and vomiting)    • Seasonal allergies        Past Surgical History:   Procedure Laterality Date   • BUNIONECTOMY Left    • COLONOSCOPY     • KNEE ARTHROSCOPY Left    • TONSILLECTOMY          Social History     Tobacco Use   • Smoking status: Never   • Smokeless tobacco: Never   Vaping Use   • Vaping Use: Never used   Substance Use Topics   • Alcohol use: Yes     Comment: occasionally   • Drug use: No       Family History   Problem Relation Age of Onset   • Hypertension Biological Mother    • Pancreatic cancer Biological Mother          at age 80   • Pancreatic cancer Biological Father          at age 52   • Heart disease Biological Brother         hx of stents in his 50s   • Hypertension Biological Brother    • No Known Problems Biological Brother    • Hypertension Biological Brother        Allergies:  Dog dander    Current Outpatient Medications   Medication Sig Dispense Refill   • albuterol HFA 90 mcg/actuation inhaler INHALE 2 PUFFS BY MOUTH EVERY 6 HOURS AS NEEDED FORWHEEZING 18 g 1   • amLODIPine (NORVASC) 5 mg tablet Take 1 tablet (5 mg total) by mouth daily. (Patient taking differently: Take 2.5 mg by mouth daily.) 90 tablet 3   • ARMOUR THYROID 60 mg tablet TAKE 3/4 (0.75) TABLET ONCE A DAY     • budesonide-formoteroL (SYMBICORT) 160-4.5 mcg/actuation inhaler Inhale 2 puffs 2 (two) times a day. Rinse mouth with water after use to reduce aftertaste and incidence of candidiasis. Do not swallow. 10.2 g 3   • hydrochlorothiazide 12.5 mg tablet TAKE 1 TABLET BY MOUTH EVERY DAY 90 tablet 1   • losartan (COZAAR) 100 mg tablet TAKE 1 TABLET BY MOUTH EVERY DAY 90 tablet 1   • MAGNESIUM ORAL Take 400 mg by mouth every evening.       • montelukast (SINGULAIR) 10 mg tablet TAKE 1 TABLET BY MOUTH EVERY DAY 90 tablet 0   • rosuvastatin (CRESTOR) 20 mg tablet Take 1 tablet (20 mg total) by mouth daily. 90 tablet 3   • valACYclovir (VALTREX) 1 gram tablet TAKE 1 TABLET BY MOUTH TWICE A DAY FOR 7 DAYS (Patient taking differently: as needed.) 30 tablet 0   • calcium carbonate/vitamin D3 (CALTRATE 600 + D ORAL) Take by mouth every evening.         No current facility-administered medications for this  visit.       Review of Systems   Constitutional: Positive for malaise/fatigue.   Cardiovascular:  Positive for dyspnea on exertion. Negative for chest pain, irregular heartbeat, leg swelling, near-syncope, orthopnea, palpitations and syncope.   Hematologic/Lymphatic: Does not bruise/bleed easily.   Musculoskeletal:  Positive for myalgias.   Neurological:  Positive for light-headedness (w/change in position). Negative for dizziness.   All other systems reviewed and are negative.      Objective    Vitals:    04/30/24 1229   BP: 120/74   Pulse: (!) 54   SpO2: 94%       Wt Readings from Last 5 Encounters:   04/30/24 59.9 kg (132 lb)   02/08/24 59.4 kg (131 lb)   02/05/24 60.1 kg (132 lb 9.6 oz)   01/30/24 59.9 kg (132 lb)   08/11/23 60.1 kg (132 lb 9.6 oz)       Body mass index is 24.14 kg/m².    Physical Exam  Vitals and nursing note reviewed.   Constitutional:       General: She is not in acute distress.     Appearance: Normal appearance.   HENT:      Head: Normocephalic.   Eyes:      Extraocular Movements: Extraocular movements intact.   Cardiovascular:      Rate and Rhythm: Regular rhythm. Bradycardia present.      Pulses: Normal pulses.      Heart sounds: Normal heart sounds.   Pulmonary:      Effort: Pulmonary effort is normal. No respiratory distress.      Breath sounds: Normal breath sounds. No wheezing or rales.   Abdominal:      General: There is no distension.      Palpations: Abdomen is soft.   Musculoskeletal:         General: Normal range of motion.      Cervical back: Normal range of motion.      Right lower leg: No edema.      Left lower leg: No edema.   Skin:     General: Skin is warm and dry.      Findings: No rash.   Neurological:      General: No focal deficit present.      Mental Status: She is alert and oriented to person, place, and time.   Psychiatric:         Mood and Affect: Mood normal.         Behavior: Behavior normal.         Thought Content: Thought content normal.         Judgment:  Judgment normal.          ECG: Normal sinus rhythm, possible old anterior MI versus lead placement artifact    Hematology  Lab Results   Component Value Date    WBC 4.59 02/06/2024    HGB 12.1 02/06/2024    HCT 38.4 02/06/2024     02/06/2024       Chemistries  Lab Results   Component Value Date     04/25/2024    K 4.3 04/25/2024     04/25/2024    CREATININE 1.1 04/25/2024    BUN 32 (H) 04/25/2024    CO2 29 04/25/2024    GLUCOSE 92 04/25/2024    CALCIUM 9.2 04/25/2024    ALT 29 04/25/2024    AST 39 04/25/2024       Cholesterol  Lab Results   Component Value Date    CHOL 177 04/25/2024    TRIG 87 04/25/2024    HDL 89 04/25/2024    LDLCALC 71 04/25/2024    NONHDLCALC 88 04/25/2024       Endocrine  Lab Results   Component Value Date    TSH 0.66 02/06/2024    FREET4 0.84 02/06/2024       Cardiac Imaging    TRANSTHORACIC ECHO (TTE) COMPLETE 03/29/2023    Interpretation Summary  •  Left Ventricle: Normal ventricle size. Normal wall thickness. Normal systolic function. Estimated EF 60-65%. Wall motion appears grossly normal. Normal diastolic filling pattern for age.  •  Right Ventricle: Normal ventricle size. Normal systolic function.  •  Left Atrium: Normal sized atrium.  •  Right Atrium: Normal sized atrium.  •  Aortic Valve: Tricuspid valve.  Sclerotic leaflets. No regurgitation. No stenosis. Calculated dimensionless index = 0.89.  •  Mitral Valve: Normal leaflet structure. Normal leaflet motion. Mild regurgitation. No stenosis.  •  Tricuspid Valve: Structure is grossly normal. Mild regurgitation. Estimated RVSP = 32 mmHg. No significant stenosis.  •  Pulmonic Valve: Normal structure. Trace regurgitation. No stenosis.  •  Aorta: Aortic root grossly normal. Sinuses of Valsalva grossly normal. Ascending aorta grossly normal. Descending aorta grossly normal. Mild dilatation of the ascending aorta.  •  IVC/SVC: Inferior vena cava is dilated (>2.1cm). Inferior vena cava demonstrates normal respiratory  collapse.  •  Pericardium: No evidence of pericardial effusion.                Assessment/Plan    Primary hypertension  The patient's blood pressure is well-controlled at that point.  She does have some transient orthostatic lightheadedness which is likely related to her hypertension medications.  She will make sure that she is careful in this regard.  I made no changes to her regimen today in this regard.    Pure hypercholesterolemia  The patient's lipid profile is responded well to statin therapy.  She has developed some upper back discomfort which is persistent and may be a manifestation of her statin therapy.  She will try coenzyme every 10 at 100 to 200 mg a day for a few weeks to see if it helps.  If so, she will continue that supplement.  If not, she will try stopping her statin to see if this symptom improves.  If so, we will give her an alternative statin to try.  I do believe it is important that we treat her lipids because of her coronary calcium score.    Murmur  We are following her clinically.  She has no significant murmur on examination to suggest significant valvular heart disease.      No orders of the defined types were placed in this encounter.      There are no discontinued medications.    Orders Placed This Encounter   Procedures   • [facility] MUSE ECG 12 lead (clinic performed)     Scheduling Instructions:      PLEASE USE THIS ORDER FOR ECG'S PERFORMED IN PHYSICIAN OFFICES     Order Specific Question:   Release to patient     Answer:   Immediate [1]       Follow Up Plans:  Return in about 6 months (around 10/30/2024).          I, Leslie Avila, am scribing for, and in the presence of, Esteban Veronica MD.    IEsteban MD, personally performed the services described in this documentation as scribed by Leslie Avila in my presence, and it is both accurate and complete.       Esteban Veronica MD  4/30/2024

## 2024-04-30 NOTE — ASSESSMENT & PLAN NOTE
We are following her clinically.  She has no significant murmur on examination to suggest significant valvular heart disease.

## 2024-05-25 RX ORDER — LOSARTAN POTASSIUM 100 MG/1
100 TABLET ORAL DAILY
Qty: 90 TABLET | Refills: 1 | Status: SHIPPED | OUTPATIENT
Start: 2024-05-25 | End: 2024-11-14

## 2024-06-10 RX ORDER — HYDROCHLOROTHIAZIDE 12.5 MG/1
12.5 TABLET ORAL DAILY
Qty: 180 TABLET | Refills: 1 | Status: SHIPPED | OUTPATIENT
Start: 2024-06-10 | End: 2024-11-20

## 2024-06-10 NOTE — TELEPHONE ENCOUNTER
[facility] Refill Request      MA Notes:      Nurse Notes:      Last Office Visit: 2/8/2024  Last Telemedicine Visit: Visit date not found    Next Office Visit: Visit date not found  Next Telemedicine Visit: Visit date not found     Most Recent BP Readings:  BP Readings from Last 3 Encounters:   04/30/24 120/74   02/08/24 128/78   02/05/24 120/70       Most recent Lab results:  Lab Results   Component Value Date    CHOL 177 04/25/2024    HDL 89 04/25/2024    TRIG 87 04/25/2024    NONHDLCALC 88 04/25/2024       Lab Results   Component Value Date    AST 39 04/25/2024    ALT 29 04/25/2024       Lab Results   Component Value Date     04/25/2024    K 4.3 04/25/2024    BUN 32 (H) 04/25/2024    CREATININE 1.1 04/25/2024       Current Medications:    Current Outpatient Medications:     albuterol HFA 90 mcg/actuation inhaler, INHALE 2 PUFFS BY MOUTH EVERY 6 HOURS AS NEEDED FORWHEEZING, Disp: 18 g, Rfl: 1    amLODIPine (NORVASC) 5 mg tablet, Take 1 tablet (5 mg total) by mouth daily. (Patient taking differently: Take 2.5 mg by mouth daily.), Disp: 90 tablet, Rfl: 3    ARMOUR THYROID 60 mg tablet, TAKE 3/4 (0.75) TABLET ONCE A DAY, Disp: , Rfl:     budesonide-formoteroL (SYMBICORT) 160-4.5 mcg/actuation inhaler, Inhale 2 puffs 2 (two) times a day. Rinse mouth with water after use to reduce aftertaste and incidence of candidiasis. Do not swallow., Disp: 10.2 g, Rfl: 3    calcium carbonate/vitamin D3 (CALTRATE 600 + D ORAL), Take by mouth every evening.  , Disp: , Rfl:     hydrochlorothiazide 12.5 mg tablet, TAKE 1 TABLET BY MOUTH EVERY DAY, Disp: 90 tablet, Rfl: 1    losartan (COZAAR) 100 mg tablet, TAKE 1 TABLET BY MOUTH EVERY DAY, Disp: 90 tablet, Rfl: 1    MAGNESIUM ORAL, Take 400 mg by mouth every evening.  , Disp: , Rfl:     montelukast (SINGULAIR) 10 mg tablet, TAKE 1 TABLET BY MOUTH EVERY DAY, Disp: 90 tablet, Rfl: 0    rosuvastatin (CRESTOR) 20 mg tablet, Take 1 tablet (20 mg total)  by mouth daily., Disp: 90 tablet, Rfl: 3    valACYclovir (VALTREX) 1 gram tablet, TAKE 1 TABLET BY MOUTH TWICE A DAY FOR 7 DAYS (Patient taking differently: as needed.), Disp: 30 tablet, Rfl: 0

## 2024-08-14 ENCOUNTER — HOSPITAL ENCOUNTER (OUTPATIENT)
Dept: RADIOLOGY | Age: 69
Discharge: HOME | End: 2024-08-14
Attending: NURSE PRACTITIONER
Payer: MEDICARE

## 2024-08-14 DIAGNOSIS — Z78.0 POST-MENOPAUSAL: ICD-10-CM

## 2024-08-14 DIAGNOSIS — Z12.31 BREAST CANCER SCREENING BY MAMMOGRAM: ICD-10-CM

## 2024-08-14 PROCEDURE — 77080 DXA BONE DENSITY AXIAL: CPT

## 2024-08-14 PROCEDURE — 77063 BREAST TOMOSYNTHESIS BI: CPT

## 2024-08-28 ENCOUNTER — TELEPHONE (OUTPATIENT)
Dept: CARDIOLOGY | Facility: CLINIC | Age: 69
End: 2024-08-28
Payer: MEDICARE

## 2024-08-28 RX ORDER — AMLODIPINE BESYLATE 5 MG/1
5 TABLET ORAL DAILY
Qty: 90 TABLET | Refills: 3 | Status: SHIPPED | OUTPATIENT
Start: 2024-08-28 | End: 2025-08-28

## 2024-08-28 NOTE — TELEPHONE ENCOUNTER
Patient returning Citlaly Pollack's call for clarification of Amlopidine.  She takes Amlopidine 5mg 1 daily.  Any further questions she can be reached at [phone]

## 2024-08-28 NOTE — TELEPHONE ENCOUNTER
Left non-urgent msg on pt cell requesting she give me a call back to clarify with how she is currently taking her amlodipine (2.5 or 5 mg daily)?  - she had requested a refill.

## 2024-09-05 RX ORDER — VALACYCLOVIR HYDROCHLORIDE 1 G/1
1000 TABLET, FILM COATED ORAL 2 TIMES DAILY
Qty: 30 TABLET | Refills: 0 | Status: SHIPPED | OUTPATIENT
Start: 2024-09-05 | End: 2024-10-02 | Stop reason: SDUPTHER

## 2024-09-05 RX ORDER — ALBUTEROL SULFATE 90 UG/1
INHALANT RESPIRATORY (INHALATION)
Qty: 18 G | Refills: 1 | Status: SHIPPED | OUTPATIENT
Start: 2024-09-05

## 2024-10-02 DIAGNOSIS — J45.20 MILD INTERMITTENT ASTHMA WITHOUT COMPLICATION: ICD-10-CM

## 2024-10-02 RX ORDER — VALACYCLOVIR HYDROCHLORIDE 1 G/1
1000 TABLET, FILM COATED ORAL 2 TIMES DAILY
Qty: 30 TABLET | Refills: 0 | Status: SHIPPED | OUTPATIENT
Start: 2024-10-02

## 2024-10-02 RX ORDER — BUDESONIDE AND FORMOTEROL FUMARATE DIHYDRATE 160; 4.5 UG/1; UG/1
2 AEROSOL RESPIRATORY (INHALATION) 2 TIMES DAILY
Qty: 10.2 G | Refills: 3 | Status: SHIPPED | OUTPATIENT
Start: 2024-10-02 | End: 2024-10-24 | Stop reason: SDUPTHER

## 2024-10-08 ENCOUNTER — TELEPHONE (OUTPATIENT)
Dept: PRIMARY CARE | Facility: CLINIC | Age: 69
End: 2024-10-08
Payer: MEDICARE

## 2024-10-24 DIAGNOSIS — J45.20 MILD INTERMITTENT ASTHMA WITHOUT COMPLICATION: ICD-10-CM

## 2024-10-24 RX ORDER — BUDESONIDE AND FORMOTEROL FUMARATE DIHYDRATE 160; 4.5 UG/1; UG/1
2 AEROSOL RESPIRATORY (INHALATION) 2 TIMES DAILY
Qty: 10.2 G | Refills: 3 | Status: SHIPPED | OUTPATIENT
Start: 2024-10-24

## 2024-10-30 ENCOUNTER — TELEPHONE (OUTPATIENT)
Dept: CARDIOLOGY | Facility: CLINIC | Age: 69
End: 2024-10-30
Payer: MEDICARE

## 2024-10-30 ENCOUNTER — OFFICE VISIT (OUTPATIENT)
Dept: CARDIOLOGY | Facility: CLINIC | Age: 69
End: 2024-10-30
Payer: MEDICARE

## 2024-10-30 VITALS
WEIGHT: 134 LBS | HEART RATE: 68 BPM | SYSTOLIC BLOOD PRESSURE: 126 MMHG | DIASTOLIC BLOOD PRESSURE: 74 MMHG | HEIGHT: 62 IN | BODY MASS INDEX: 24.66 KG/M2 | OXYGEN SATURATION: 92 %

## 2024-10-30 DIAGNOSIS — R93.1 AGATSTON CORONARY ARTERY CALCIUM SCORE GREATER THAN 400: ICD-10-CM

## 2024-10-30 DIAGNOSIS — E78.00 PURE HYPERCHOLESTEROLEMIA: ICD-10-CM

## 2024-10-30 DIAGNOSIS — I10 PRIMARY HYPERTENSION: ICD-10-CM

## 2024-10-30 DIAGNOSIS — I34.0 MITRAL VALVE INSUFFICIENCY, UNSPECIFIED ETIOLOGY: ICD-10-CM

## 2024-10-30 DIAGNOSIS — R07.89 CHEST DISCOMFORT: Primary | ICD-10-CM

## 2024-10-30 LAB
ATRIAL RATE: 60
P AXIS: 33
PR INTERVAL: 194
QRS DURATION: 76
QT INTERVAL: 412
QTC CALCULATION(BAZETT): 412
R AXIS: 27
T WAVE AXIS: 66
VENTRICULAR RATE: 60

## 2024-10-30 PROCEDURE — G8752 SYS BP LESS 140: HCPCS | Performed by: INTERNAL MEDICINE

## 2024-10-30 PROCEDURE — 99214 OFFICE O/P EST MOD 30 MIN: CPT | Performed by: INTERNAL MEDICINE

## 2024-10-30 PROCEDURE — 93000 ELECTROCARDIOGRAM COMPLETE: CPT | Performed by: INTERNAL MEDICINE

## 2024-10-30 PROCEDURE — G8754 DIAS BP LESS 90: HCPCS | Performed by: INTERNAL MEDICINE

## 2024-10-30 ASSESSMENT — ENCOUNTER SYMPTOMS
DYSPNEA ON EXERTION: 1
DIZZINESS: 0
LIGHT-HEADEDNESS: 1
HEADACHES: 0
NEAR-SYNCOPE: 0
SYNCOPE: 0
PALPITATIONS: 1

## 2024-10-30 NOTE — TELEPHONE ENCOUNTER
Pt ordered:     Nuclear stress test - exercise ([facility])    Please reach out to pt and schedule.    Pt #:[phone]      TY!

## 2024-10-30 NOTE — LETTER
October 30, 2024     TERESA Torres  [address] [facility]  [address]  [place] [address]    Patient: Betina Malhotra  YOB: 1955  Date of Visit: 10/30/2024      Dear Dr. Paz:    Thank you for referring Betina Malhotra to me for evaluation. Below are my notes for this consultation.    If you have questions, please do not hesitate to call me. I look forward to following your patient along with you.         Sincerely,        Esteban Veronica MD        CC: No Recipients    Esteban Veronica MD  10/30/2024  3:55 PM  Sign when Signing Visit       Cardiology  Office Progress Note         Reason for visit:   Chief Complaint   Patient presents with   • Follow-up       HPI    Betina Malhotra is a 68 y.o. female who presents to the office for cardiovascular follow up and management of CAD (CACS 2/1/24 - 751: LM 0, , LCX 29, ), hypertension and mitral insufficiency.      She was last seen in the office on 4/30/24. She had developed some upper back discomfort and wondered if this was related to statin therapy. I advised to increase coenzyme from 100 to 200 mg daily for a few weeks to see if this improved her symptoms. No other changes made and advised to follow up in 6 months.     Today she reports occasional shortness of breath with stairs or inclines. She also endorses some positional lightheadedness. She denies any dizziness, syncope or chest pain. She reports that her heart has raced only 2 times over the last 6 months and did not last for a long time.     Past Medical History:   Diagnosis Date   • Anemia    • Arthritis    • Asthma    • Coronary artery disease     CACS 2/1/24 - 751:  LM 0, , LCX 29,    • Hypertension    • Hypothyroidism    • Murmur    • PONV (postoperative nausea and vomiting)    • Seasonal allergies        Past Surgical History   Procedure Laterality Date   • Bunionectomy Left    • Colonoscopy     • Knee arthroscopy Left    • Tonsillectomy     •  Total Knee Replacement, Knee Unicompartmental Arthroplasty Left 2018    Performed by Yunier Penn MD at [facility] DO NOT SELECT [facility] OR       Social History     Tobacco Use   • Smoking status: Never   • Smokeless tobacco: Never   Vaping Use   • Vaping status: Never Used   Substance Use Topics   • Alcohol use: Yes     Comment: occasionally   • Drug use: No       Family History   Problem Relation Name Age of Onset   • Hypertension Biological Mother     • Pancreatic cancer Biological Mother           at age 80   • Pancreatic cancer Biological Father           at age 52   • Heart disease Biological Brother Kelsy         hx of stents in his 50s   • Hypertension Biological Brother Kelsy    • No Known Problems Biological Brother Ranjit    • Hypertension Biological Brother Raphael        Allergies:  Dog dander    Current Outpatient Medications   Medication Sig Dispense Refill   • albuterol HFA 90 mcg/actuation inhaler INHALE 2 PUFFS BY MOUTH EVERY 6 HOURS AS NEEDED FORWHEEZING 18 g 1   • amLODIPine (NORVASC) 5 mg tablet Take 1 tablet (5 mg total) by mouth daily. 90 tablet 3   • ARMOUR THYROID 60 mg tablet TAKE 3/4 (0.75) TABLET ONCE A DAY     • budesonide-formoteroL (SYMBICORT) 160-4.5 mcg/actuation inhaler Inhale 2 puffs 2 (two) times a day. Rinse mouth with water after use to reduce aftertaste and incidence of candidiasis. Do not swallow. 10.2 g 3   • hydrochlorothiazide 12.5 mg tablet Take 1 tablet (12.5 mg total) by mouth daily. 180 tablet 1   • losartan (COZAAR) 100 mg tablet TAKE 1 TABLET BY MOUTH EVERY DAY 90 tablet 1   • MAGNESIUM ORAL Take 400 mg by mouth every evening.       • rosuvastatin (CRESTOR) 20 mg tablet Take 1 tablet (20 mg total) by mouth daily. 90 tablet 3   • valACYclovir (VALTREX) 1 gram tablet Take 1 tablet (1,000 mg total) by mouth 2 (two) times a day. TAKE 1 TABLET BY MOUTH TWICE A DAY FOR 7 DAYS  Strength: 1 g 30 tablet 0     No current facility-administered medications for this visit.        Review of Systems   Cardiovascular:  Positive for dyspnea on exertion and palpitations (x2 episodes). Negative for chest pain, leg swelling, near-syncope and syncope.   Neurological:  Positive for light-headedness. Negative for dizziness and headaches.       Objective    Vitals:    10/30/24 1505   BP: 126/74   Pulse: 68   SpO2: 92%       Wt Readings from Last 5 Encounters:   10/30/24 60.8 kg (134 lb)   04/30/24 59.9 kg (132 lb)   02/08/24 59.4 kg (131 lb)   02/05/24 60.1 kg (132 lb 9.6 oz)   01/30/24 59.9 kg (132 lb)       Body mass index is 24.51 kg/m².    Physical Exam  Vitals and nursing note reviewed.   Constitutional:       General: She is not in acute distress.     Appearance: Normal appearance.   HENT:      Head: Normocephalic.   Eyes:      Extraocular Movements: Extraocular movements intact.   Cardiovascular:      Rate and Rhythm: Regular rhythm. Bradycardia present.      Pulses: Normal pulses.      Heart sounds: Normal heart sounds. No murmur heard.  Pulmonary:      Effort: Pulmonary effort is normal. No respiratory distress.      Breath sounds: Normal breath sounds. No wheezing or rales.   Abdominal:      General: There is no distension.      Palpations: Abdomen is soft.   Musculoskeletal:         General: Normal range of motion.      Cervical back: Normal range of motion.      Right lower leg: No edema.      Left lower leg: No edema.   Skin:     General: Skin is warm and dry.      Findings: No rash.   Neurological:      General: No focal deficit present.      Mental Status: She is alert and oriented to person, place, and time.   Psychiatric:         Mood and Affect: Mood normal.         Behavior: Behavior normal.         Thought Content: Thought content normal.         Judgment: Judgment normal.          ECG: Normal sinus rhythm, delayed R wave transition-cannot rule out old septal infarct    Hematology  Lab Results   Component Value Date    WBC 4.59 02/06/2024    HGB 12.1 02/06/2024    HCT 38.4  02/06/2024     02/06/2024       Chemistries  Lab Results   Component Value Date     04/25/2024    K 4.3 04/25/2024     04/25/2024    CREATININE 1.1 04/25/2024    BUN 32 (H) 04/25/2024    CO2 29 04/25/2024    GLUCOSE 92 04/25/2024    CALCIUM 9.2 04/25/2024    ALT 29 04/25/2024    AST 39 04/25/2024       Cholesterol  Lab Results   Component Value Date    CHOL 177 04/25/2024    TRIG 87 04/25/2024    HDL 89 04/25/2024    LDLCALC 71 04/25/2024    NONHDLCALC 88 04/25/2024       Endocrine  Lab Results   Component Value Date    TSH 0.66 02/06/2024    FREET4 0.84 02/06/2024       Cardiac Imaging    TRANSTHORACIC ECHO (TTE) COMPLETE 03/29/2023    Interpretation Summary  •  Left Ventricle: Normal ventricle size. Normal wall thickness. Normal systolic function. Estimated EF 60-65%. Wall motion appears grossly normal. Normal diastolic filling pattern for age.  •  Right Ventricle: Normal ventricle size. Normal systolic function.  •  Left Atrium: Normal sized atrium.  •  Right Atrium: Normal sized atrium.  •  Aortic Valve: Tricuspid valve.  Sclerotic leaflets. No regurgitation. No stenosis. Calculated dimensionless index = 0.89.  •  Mitral Valve: Normal leaflet structure. Normal leaflet motion. Mild regurgitation. No stenosis.  •  Tricuspid Valve: Structure is grossly normal. Mild regurgitation. Estimated RVSP = 32 mmHg. No significant stenosis.  •  Pulmonic Valve: Normal structure. Trace regurgitation. No stenosis.  •  Aorta: Aortic root grossly normal. Sinuses of Valsalva grossly normal. Ascending aorta grossly normal. Descending aorta grossly normal. Mild dilatation of the ascending aorta.  •  IVC/SVC: Inferior vena cava is dilated (>2.1cm). Inferior vena cava demonstrates normal respiratory collapse.  •  Pericardium: No evidence of pericardial effusion.                Assessment/Plan    Agatston coronary artery calcium score greater than 400  The patient notes some increasing exertional dyspnea.  While it  may be from her asthma, I am concerned that she could possibly be harboring coronary disease given her coronary calcium score.  For that reason, I have asked her to have an exercise nuclear stress study and she is agreed.  She will schedule the test at her earliest convenience and will return to the office so that we can review the results.  She will have an echocardiogram when she returns to complete her cardiac evaluation.    Primary hypertension  The patient's blood pressure is well controlled on the current medical regimen.  There are no apparent side effects from medications.  We will continue the current therapy without change.    Pure hypercholesterolemia  The patient's lipids are adequately controlled on current medication.  The patient is tolerating medication without side effects.  I will continue the current treatment.      No orders of the defined types were placed in this encounter.      Medications Discontinued During This Encounter   Medication Reason   • calcium carbonate/vitamin D3 (CALTRATE 600 + D ORAL) Patient Discontinued Medication   • montelukast (SINGULAIR) 10 mg tablet Patient Discontinued Medication       Orders Placed This Encounter   Procedures   • [place] MUSE ECG 12 lead (clinic performed)     Scheduling Instructions:      PLEASE USE THIS ORDER FOR ECG'S PERFORMED IN PHYSICIAN OFFICES     Order Specific Question:   Release to patient     Answer:   Immediate [1]   • Transthoracic echo (TTE) complete     Standing Status:   Future     Standing Expiration Date:   10/30/2025     Scheduling Instructions:      To schedule cardiology appointments with [facility], call Central Scheduling at [phone]. Thank you.     Order Specific Question:   Is contrast and/or agitated saline (bubbles) indicated for the study? (Contrast = Definity OR Lumason)     Answer:   No     Order Specific Question:   Release to patient     Answer:   Immediate     Order Specific Question:   Release to patient      Answer:   Immediate [1]       Follow Up Plans:  Return in about 6 months (around 4/30/2025) for Recheck.          I, Citlali Castellon, am scribing for, and in the presence of, Esteban Veronica MD.    I, Esteban Veronica MD, personally performed the services described in this documentation as scribed by Citlali Castellon in my presence, and it is both accurate and complete.       Esteban Veronica MD  10/30/2024

## 2024-10-30 NOTE — PROGRESS NOTES
Cardiology  Office Progress Note         Reason for visit:   Chief Complaint   Patient presents with    Follow-up       HPI     Betina Malhotra is a 68 y.o. female who presents to the office for cardiovascular follow up and management of CAD (CACS 24 - 751: LM 0, , LCX 29, ), hypertension and mitral insufficiency.      She was last seen in the office on 24. She had developed some upper back discomfort and wondered if this was related to statin therapy. I advised to increase coenzyme from 100 to 200 mg daily for a few weeks to see if this improved her symptoms. No other changes made and advised to follow up in 6 months.     Today she reports occasional shortness of breath with stairs or inclines. She also endorses some positional lightheadedness. She denies any dizziness, syncope or chest pain. She reports that her heart has raced only 2 times over the last 6 months and did not last for a long time.     Past Medical History:   Diagnosis Date    Anemia     Arthritis     Asthma     Coronary artery disease     CACS 24 - 751:  LM 0, , LCX 29,     Hypertension     Hypothyroidism     Murmur     PONV (postoperative nausea and vomiting)     Seasonal allergies        Past Surgical History   Procedure Laterality Date    Bunionectomy Left     Colonoscopy      Knee arthroscopy Left     Tonsillectomy      Total Knee Replacement, Knee Unicompartmental Arthroplasty Left 2018    Performed by Yunier Penn MD at [facility] [place] DO NOT SELECT [facility] OR       Social History     Tobacco Use    Smoking status: Never    Smokeless tobacco: Never   Vaping Use    Vaping status: Never Used   Substance Use Topics    Alcohol use: Yes     Comment: occasionally    Drug use: No       Family History   Problem Relation Name Age of Onset    Hypertension Biological Mother      Pancreatic cancer Biological Mother           at age 80    Pancreatic cancer Biological Father           at age 52    Heart  disease Biological Brother Kelsy         hx of stents in his 50s    Hypertension Biological Brother Kelsy     No Known Problems Biological Brother Ranjit     Hypertension Biological Brother Raphael        Allergies:  Dog dander    Current Outpatient Medications   Medication Sig Dispense Refill    albuterol HFA 90 mcg/actuation inhaler INHALE 2 PUFFS BY MOUTH EVERY 6 HOURS AS NEEDED FORWHEEZING 18 g 1    amLODIPine (NORVASC) 5 mg tablet Take 1 tablet (5 mg total) by mouth daily. 90 tablet 3    ARMOUR THYROID 60 mg tablet TAKE 3/4 (0.75) TABLET ONCE A DAY      budesonide-formoteroL (SYMBICORT) 160-4.5 mcg/actuation inhaler Inhale 2 puffs 2 (two) times a day. Rinse mouth with water after use to reduce aftertaste and incidence of candidiasis. Do not swallow. 10.2 g 3    hydrochlorothiazide 12.5 mg tablet Take 1 tablet (12.5 mg total) by mouth daily. 180 tablet 1    losartan (COZAAR) 100 mg tablet TAKE 1 TABLET BY MOUTH EVERY DAY 90 tablet 1    MAGNESIUM ORAL Take 400 mg by mouth every evening.        rosuvastatin (CRESTOR) 20 mg tablet Take 1 tablet (20 mg total) by mouth daily. 90 tablet 3    valACYclovir (VALTREX) 1 gram tablet Take 1 tablet (1,000 mg total) by mouth 2 (two) times a day. TAKE 1 TABLET BY MOUTH TWICE A DAY FOR 7 DAYS  Strength: 1 g 30 tablet 0     No current facility-administered medications for this visit.       Review of Systems   Cardiovascular:  Positive for dyspnea on exertion and palpitations (x2 episodes). Negative for chest pain, leg swelling, near-syncope and syncope.   Neurological:  Positive for light-headedness. Negative for dizziness and headaches.       Objective     Vitals:    10/30/24 1505   BP: 126/74   Pulse: 68   SpO2: 92%       Wt Readings from Last 5 Encounters:   10/30/24 60.8 kg (134 lb)   04/30/24 59.9 kg (132 lb)   02/08/24 59.4 kg (131 lb)   02/05/24 60.1 kg (132 lb 9.6 oz)   01/30/24 59.9 kg (132 lb)       Body mass index is 24.51 kg/m².    Physical Exam  Vitals and nursing note  reviewed.   Constitutional:       General: She is not in acute distress.     Appearance: Normal appearance.   HENT:      Head: Normocephalic.   Eyes:      Extraocular Movements: Extraocular movements intact.   Cardiovascular:      Rate and Rhythm: Regular rhythm. Bradycardia present.      Pulses: Normal pulses.      Heart sounds: Normal heart sounds. No murmur heard.  Pulmonary:      Effort: Pulmonary effort is normal. No respiratory distress.      Breath sounds: Normal breath sounds. No wheezing or rales.   Abdominal:      General: There is no distension.      Palpations: Abdomen is soft.   Musculoskeletal:         General: Normal range of motion.      Cervical back: Normal range of motion.      Right lower leg: No edema.      Left lower leg: No edema.   Skin:     General: Skin is warm and dry.      Findings: No rash.   Neurological:      General: No focal deficit present.      Mental Status: She is alert and oriented to person, place, and time.   Psychiatric:         Mood and Affect: Mood normal.         Behavior: Behavior normal.         Thought Content: Thought content normal.         Judgment: Judgment normal.          ECG: Normal sinus rhythm, delayed R wave transition-cannot rule out old septal infarct    Hematology  Lab Results   Component Value Date    WBC 4.59 02/06/2024    HGB 12.1 02/06/2024    HCT 38.4 02/06/2024     02/06/2024       Chemistries  Lab Results   Component Value Date     04/25/2024    K 4.3 04/25/2024     04/25/2024    CREATININE 1.1 04/25/2024    BUN 32 (H) 04/25/2024    CO2 29 04/25/2024    GLUCOSE 92 04/25/2024    CALCIUM 9.2 04/25/2024    ALT 29 04/25/2024    AST 39 04/25/2024       Cholesterol  Lab Results   Component Value Date    CHOL 177 04/25/2024    TRIG 87 04/25/2024    HDL 89 04/25/2024    LDLCALC 71 04/25/2024    NONHDLCALC 88 04/25/2024       Endocrine  Lab Results   Component Value Date    TSH 0.66 02/06/2024    FREET4 0.84 02/06/2024       Cardiac  Imaging    TRANSTHORACIC ECHO (TTE) COMPLETE 03/29/2023    Interpretation Summary    Left Ventricle: Normal ventricle size. Normal wall thickness. Normal systolic function. Estimated EF 60-65%. Wall motion appears grossly normal. Normal diastolic filling pattern for age.    Right Ventricle: Normal ventricle size. Normal systolic function.    Left Atrium: Normal sized atrium.    Right Atrium: Normal sized atrium.    Aortic Valve: Tricuspid valve.  Sclerotic leaflets. No regurgitation. No stenosis. Calculated dimensionless index = 0.89.    Mitral Valve: Normal leaflet structure. Normal leaflet motion. Mild regurgitation. No stenosis.    Tricuspid Valve: Structure is grossly normal. Mild regurgitation. Estimated RVSP = 32 mmHg. No significant stenosis.    Pulmonic Valve: Normal structure. Trace regurgitation. No stenosis.    Aorta: Aortic root grossly normal. Sinuses of Valsalva grossly normal. Ascending aorta grossly normal. Descending aorta grossly normal. Mild dilatation of the ascending aorta.    IVC/SVC: Inferior vena cava is dilated (>2.1cm). Inferior vena cava demonstrates normal respiratory collapse.    Pericardium: No evidence of pericardial effusion.                Assessment/Plan     Agatston coronary artery calcium score greater than 400  The patient notes some increasing exertional dyspnea.  While it may be from her asthma, I am concerned that she could possibly be harboring coronary disease given her coronary calcium score.  For that reason, I have asked her to have an exercise nuclear stress study and she is agreed.  She will schedule the test at her earliest convenience and will return to the office so that we can review the results.  She will have an echocardiogram when she returns to complete her cardiac evaluation.    Primary hypertension  The patient's blood pressure is well controlled on the current medical regimen.  There are no apparent side effects from medications.  We will continue the current  therapy without change.    Pure hypercholesterolemia  The patient's lipids are adequately controlled on current medication.  The patient is tolerating medication without side effects.  I will continue the current treatment.      No orders of the defined types were placed in this encounter.      Medications Discontinued During This Encounter   Medication Reason    calcium carbonate/vitamin D3 (CALTRATE 600 + D ORAL) Patient Discontinued Medication    montelukast (SINGULAIR) 10 mg tablet Patient Discontinued Medication       Orders Placed This Encounter   Procedures    [facility] MUSE ECG 12 lead (clinic performed)     Scheduling Instructions:      PLEASE USE THIS ORDER FOR ECG'S PERFORMED IN PHYSICIAN OFFICES     Order Specific Question:   Release to patient     Answer:   Immediate [1]    Transthoracic echo (TTE) complete     Standing Status:   Future     Standing Expiration Date:   10/30/2025     Scheduling Instructions:      To schedule cardiology appointments with [facility], call Central Scheduling at [phone]. Thank you.     Order Specific Question:   Is contrast and/or agitated saline (bubbles) indicated for the study? (Contrast = Definity OR Lumason)     Answer:   No     Order Specific Question:   Release to patient     Answer:   Immediate     Order Specific Question:   Release to patient     Answer:   Immediate [1]       Follow Up Plans:  Return in about 6 months (around 4/30/2025) for Recheck.          ICitlali, am scribing for, and in the presence of, Esteban Veronica MD.    IEsteban MD, personally performed the services described in this documentation as scribed by Citlali Castellon in my presence, and it is both accurate and complete.       Esteban Veronica MD  10/30/2024

## 2024-10-30 NOTE — ASSESSMENT & PLAN NOTE
The patient notes some increasing exertional dyspnea.  While it may be from her asthma, I am concerned that she could possibly be harboring coronary disease given her coronary calcium score.  For that reason, I have asked her to have an exercise nuclear stress study and she is agreed.  She will schedule the test at her earliest convenience and will return to the office so that we can review the results.  She will have an echocardiogram when she returns to complete her cardiac evaluation.

## 2024-11-06 ENCOUNTER — HOSPITAL ENCOUNTER (OUTPATIENT)
Dept: CARDIOLOGY | Facility: CLINIC | Age: 69
Discharge: HOME | End: 2024-11-06
Attending: INTERNAL MEDICINE
Payer: MEDICARE

## 2024-11-06 VITALS — WEIGHT: 134 LBS | HEIGHT: 62 IN | BODY MASS INDEX: 24.66 KG/M2

## 2024-11-06 VITALS — HEIGHT: 62 IN | WEIGHT: 134 LBS | BODY MASS INDEX: 24.66 KG/M2

## 2024-11-06 DIAGNOSIS — I34.0 MITRAL VALVE INSUFFICIENCY, UNSPECIFIED ETIOLOGY: ICD-10-CM

## 2024-11-06 DIAGNOSIS — R93.1 AGATSTON CORONARY ARTERY CALCIUM SCORE GREATER THAN 400: ICD-10-CM

## 2024-11-06 DIAGNOSIS — E78.00 PURE HYPERCHOLESTEROLEMIA: ICD-10-CM

## 2024-11-06 DIAGNOSIS — R07.89 CHEST DISCOMFORT: ICD-10-CM

## 2024-11-06 DIAGNOSIS — I10 PRIMARY HYPERTENSION: ICD-10-CM

## 2024-11-06 LAB
CV NM TETROFOSMIN REST DOSE: 7.9 MCI
CV NM TETROFOSMIN STRESS DOSE: 25.8 MCI
MLH CV NM REST ADMIN DATE: NORMAL MM/DD/YYYY
MLH CV NM REST ADMIN TIME: NORMAL HR:MIN
MLH CV NM STRESS ADMIN DATE: NORMAL MM/DD/YYYY
MLH CV NM STRESS ADMIN TIME: NORMAL HR:MIN
NUC STRESS EJECTION FRACTION: 79 %
STRESS BASELINE BP: NORMAL MMHG
STRESS BASELINE HR: 60 BPM
STRESS ECHO POST RECOVERY HR: 112 BPM
STRESS PERCENT HR: 88 %
STRESS POST ESTIMATED WORKLOAD: 11.9 METS
STRESS POST EXERCISE DUR MIN: 9 MIN
STRESS POST EXERCISE DUR SEC: 33 SEC
STRESS POST PEAK BP: NORMAL MMHG
STRESS POST PEAK HR: 134 BPM
STRESS ST DEPRESSION: 1 MM
STRESS TARGET HR: 129 BPM

## 2024-11-06 PROCEDURE — 78452 HT MUSCLE IMAGE SPECT MULT: CPT | Performed by: INTERNAL MEDICINE

## 2024-11-06 PROCEDURE — 200200 PR NO CHARGE: Performed by: INTERNAL MEDICINE

## 2024-11-06 PROCEDURE — 93015 CV STRESS TEST SUPVJ I&R: CPT | Performed by: INTERNAL MEDICINE

## 2024-11-06 PROCEDURE — A9502 TC99M TETROFOSMIN: HCPCS | Performed by: INTERNAL MEDICINE

## 2024-11-14 RX ORDER — LOSARTAN POTASSIUM 100 MG/1
100 TABLET ORAL DAILY
Qty: 90 TABLET | Refills: 1 | Status: SHIPPED | OUTPATIENT
Start: 2024-11-14

## 2024-11-20 ENCOUNTER — OFFICE VISIT (OUTPATIENT)
Dept: CARDIOLOGY | Facility: CLINIC | Age: 69
End: 2024-11-20
Payer: MEDICARE

## 2024-11-20 ENCOUNTER — HOSPITAL ENCOUNTER (OUTPATIENT)
Dept: CARDIOLOGY | Facility: CLINIC | Age: 69
Discharge: HOME | End: 2024-11-20
Attending: INTERNAL MEDICINE
Payer: MEDICARE

## 2024-11-20 VITALS
DIASTOLIC BLOOD PRESSURE: 70 MMHG | HEART RATE: 52 BPM | WEIGHT: 134 LBS | SYSTOLIC BLOOD PRESSURE: 112 MMHG | BODY MASS INDEX: 24.66 KG/M2 | HEIGHT: 62 IN | OXYGEN SATURATION: 99 %

## 2024-11-20 VITALS
SYSTOLIC BLOOD PRESSURE: 112 MMHG | DIASTOLIC BLOOD PRESSURE: 64 MMHG | HEIGHT: 62 IN | BODY MASS INDEX: 24.66 KG/M2 | WEIGHT: 134 LBS

## 2024-11-20 DIAGNOSIS — I10 PRIMARY HYPERTENSION: ICD-10-CM

## 2024-11-20 DIAGNOSIS — E78.00 PURE HYPERCHOLESTEROLEMIA: ICD-10-CM

## 2024-11-20 DIAGNOSIS — R93.1 AGATSTON CORONARY ARTERY CALCIUM SCORE GREATER THAN 400: ICD-10-CM

## 2024-11-20 DIAGNOSIS — I34.0 MITRAL VALVE INSUFFICIENCY, UNSPECIFIED ETIOLOGY: Primary | ICD-10-CM

## 2024-11-20 DIAGNOSIS — I34.0 MITRAL VALVE INSUFFICIENCY, UNSPECIFIED ETIOLOGY: ICD-10-CM

## 2024-11-20 LAB
AORTIC ROOT ANNULUS: 3.1 CM
AORTIC VALVE MEAN VELOCITY: 0.93 M/S
AORTIC VALVE VELOCITY TIME INTEGRAL: 33.9 CM
ASCENDING AORTA: 3.8 CM
ATRIAL RATE: 52
AV MEAN GRADIENT: 4 MMHG
AV PEAK GRADIENT: 10 MMHG
AV PEAK VELOCITY-S: 1.59 M/S
AV VALVE AREA INDEX: 1.33
AV VALVE AREA: 1.75 CM2
AV VELOCITY RATIO: 0.76
AVA (VTI): 2.17 CM2
BSA FOR ECHO PROCEDURE: 1.63 M2
CUSP SEPARATION: 1.8 CM
DOP CALC LVOT STROKE VOLUME: 73.4 CM3
E WAVE DECELERATION TIME: 265 MS
E/A RATIO: 0.7
E/E' RATIO: 7.4
E/LAT E' RATIO: 5.8
EDV (BP): 86.8 CM3
EF (A4C): 65.5 %
EF A2C: 69.7 %
EJECTION FRACTION: 66.5 %
EST RIGHT VENT SYSTOLIC PRESSURE BY TRICUSPID REGURGITATION JET: 27 MMHG
ESV (BP): 29.1 CM3
FRACTIONAL SHORTENING: 34.4 %
HEART RATE: 53 BPM
INTERVENTRICULAR SEPTUM: 0.81 CM
LA ESV (BP): 37.4 CM3
LA ESV INDEX (A2C): 32.82 CM3/M2
LA ESV INDEX (BP): 22.94 CM3/M2
LA/AORTA RATIO: 1
LAAS-AP2: 19.7 CM2
LAAS-AP4: 13.3 CM2
LAD 2D: 3.1 CM
LALD A4C: 5.66 CM
LALD A4C: 5.8 CM
LAV-S: 53.5 CM3
LEFT ATRIUM VOLUME INDEX: 15.77 CM3/M2
LEFT ATRIUM VOLUME: 25.7 CM3
LEFT INTERNAL DIMENSION IN SYSTOLE: 2.88 CM (ref 2.3–3.49)
LEFT VENTRICLE DIASTOLIC VOLUME INDEX: 51.23 CM3/M2
LEFT VENTRICLE DIASTOLIC VOLUME: 83.5 CM3
LEFT VENTRICLE SYSTOLIC VOLUME INDEX: 17.73 CM3/M2
LEFT VENTRICLE SYSTOLIC VOLUME: 28.9 CM3
LEFT VENTRICULAR INTERNAL DIMENSION IN DIASTOLE: 4.39 CM (ref 3.88–5.38)
LEFT VENTRICULAR POSTERIOR WALL IN END DIASTOLE: 0.8 CM (ref 0.5–0.93)
LV DIASTOLIC VOLUME: 85.6 CM3
LV ESV (APICAL 2 CHAMBER): 25.9 CM3
LVAD-AP2: 27.2 CM2
LVAD-AP4: 28.2 CM2
LVAS-AP2: 13 CM2
LVAS-AP4: 15 CM2
LVEDVI(A2C): 52.52 CM3/M2
LVEDVI(BP): 53.25 CM3/M2
LVESVI(A2C): 15.89 CM3/M2
LVESVI(BP): 17.85 CM3/M2
LVLD-AP2: 7.27 CM
LVLD-AP4: 7.81 CM
LVLS-AP2: 5.66 CM
LVLS-AP4: 6.48 CM
LVOT 2D: 1.9 CM
LVOT A: 2.84 CM2
LVOT MG: 3 MMHG
LVOT MV: 0.75 M/S
LVOT PEAK VELOCITY: 1.25 M/S
LVOT PG: 6 MMHG
LVOT STROKE VOLUME INDEX: 45.03 ML/M2
LVOT VTI: 25.9 CM
MLH CV ECHO AVA INDEX VELOCITY RATIO: 1.1
MV E'TISSUE VEL-LAT: 0.11 M/S
MV E'TISSUE VEL-MED: 0.08 M/S
MV MEAN GRADIENT: 1 MMHG
MV PEAK A VEL: 0.87 M/S
MV PEAK E VEL: 0.62 M/S
MV PEAK GRADIENT: 4 MMHG
MV STENOSIS PRESSURE HALF TIME: 78 MS
MV VALVE AREA BY CONTINUITY EQUATION: 1.86 CM2
MV VALVE AREA P 1/2 METHOD: 2.82 CM2
MV VTI: 39.4 CM
P AXIS: 77
POSTERIOR WALL: 0.8 CM
PR INTERVAL: 218
PULMONARY REGURGITATION LATE DIASTOLIC VELOCITY: 0.82 M/S
PV PEAK GRADIENT: 3 MMHG
PV PV: 0.84 M/S
QRS DURATION: 88
QT INTERVAL: 456
QTC CALCULATION(BAZETT): 424
R AXIS: 81
RAP: 3 MMHG
RVOT VMAX: 0.79 M/S
SEPTAL TISSUE DOPPLER FREE WALL LATE DIA VELOCITY (APICAL 4 CHAMBER VIEW): 0.12 M/S
T WAVE AXIS: 80
TR MAX PG: 23.62 MMHG
TRICUSPID VALVE PEAK REGURGITATION VELOCITY: 2.43 M/S
VENTRICULAR RATE: 52
Z-SCORE OF LEFT VENTRICULAR DIMENSION IN END DIASTOLE: -0.4
Z-SCORE OF LEFT VENTRICULAR DIMENSION IN END SYSTOLE: 0.13
Z-SCORE OF LEFT VENTRICULAR POSTERIOR WALL IN END DIASTOLE: 0.83

## 2024-11-20 PROCEDURE — 99214 OFFICE O/P EST MOD 30 MIN: CPT | Performed by: NURSE PRACTITIONER

## 2024-11-20 PROCEDURE — 93000 ELECTROCARDIOGRAM COMPLETE: CPT | Performed by: NURSE PRACTITIONER

## 2024-11-20 PROCEDURE — 93306 TTE W/DOPPLER COMPLETE: CPT | Performed by: INTERNAL MEDICINE

## 2024-11-20 PROCEDURE — G8754 DIAS BP LESS 90: HCPCS | Performed by: NURSE PRACTITIONER

## 2024-11-20 PROCEDURE — G8752 SYS BP LESS 140: HCPCS | Performed by: NURSE PRACTITIONER

## 2024-11-20 RX ORDER — HYDROCHLOROTHIAZIDE 25 MG/1
25 TABLET ORAL DAILY
Qty: 90 TABLET | Refills: 0 | Status: SHIPPED | OUTPATIENT
Start: 2024-11-20 | End: 2025-02-20

## 2024-11-20 RX ORDER — HYDROCHLOROTHIAZIDE 12.5 MG/1
25 TABLET ORAL DAILY
COMMUNITY
Start: 2024-11-20 | End: 2024-11-20

## 2024-11-20 ASSESSMENT — ENCOUNTER SYMPTOMS
BRUISES/BLEEDS EASILY: 0
SHORTNESS OF BREATH: 0
PALPITATIONS: 0
IRREGULAR HEARTBEAT: 0
DYSPNEA ON EXERTION: 1
LIGHT-HEADEDNESS: 1
DIZZINESS: 0
SYNCOPE: 0

## 2024-11-20 NOTE — ASSESSMENT & PLAN NOTE
FLP 4/25/2024: , TG 87, HDL 89, LDL 71  - Continue same dose of rosuvastatin.  - Check FLP and LP(a).

## 2024-11-20 NOTE — ASSESSMENT & PLAN NOTE
- Exercise nuclear stress 10/30/24: Stress discordancy with positive stress EKG and normal perfusion study.   - TTE 11/20/2024: EF 60 to 65%, NWMA, LV normal, RV normal, trace AI  -Her resting EKG has no ischemic changes and she denies chest discomfort that is suggestive of angina.  - Discordancy noticed in her stress test likely either false positive or possibly consistent with small vessel disease.  - Continue risk factor modification with rosuvastatin.  - Check FLP and LP(a).

## 2024-11-20 NOTE — LETTER
November 20, 2024     TERESA Torres  [address] [facility] [address]  [address]  [place] [id number]    Patient: Betina Malhotra  YOB: 1955  Date of Visit: 11/20/2024      Dear Dr. aPz:    Thank you for referring Betina Malhotra to me for evaluation. Below are my notes for this consultation.    If you have questions, please do not hesitate to call me. I look forward to following your patient along with you.         Sincerely,        TERESA Reyes        CC: MD Dwight Bowie Kimberly W, CRNP  11/20/2024  3:27 PM  Sign when Signing Visit       Cardiology  Office Progress Note         Reason for visit:   Chief Complaint   Patient presents with   • Follow-up       HPI       Betina Malhotra is a 68 y.o. female who presents to the office for cardiovascular follow up and management of CAD (CACS 2/1/24 - 751: LM 0, , LCX 29, ), hypertension and mitral insufficiency.    She was last seen in the office 10/30/2024 by Dr. Veronica.  At the time of her last visit, she reported occasional shortness of breath with stairs and inclines as well as some positional lightheadedness.  She was to have an exercise nuclear stress test and resting echocardiogram.  To follow-up in 6 months.    Exercise nuclear stress 10/30/24: Stress discordancy with positive stress EKG and normal perfusion study.    TTE 11/20/2024: EF 60 to 65%, NWMA, LV normal, RV normal, trace AI    Today:    She reports that she has been feling well. She can get some shortness of breath with climbing stairs that is unchanged. She continues to exercise regularlay with cardio and light weights, if she feels short of breath she will use her inhaler and this will resolve. She reports some intermittent lightheadedness with position changes but admits that she does not drink much water. She denies chest pain/discomfort, palpitations, feeling  syncope and LE edema. She checks her BP on occasion and reports taht this is usually  at goal.       Past Medical History:   Diagnosis Date   • Anemia    • Arthritis    • Asthma    • Coronary artery disease     CACS 24 - 751:  LM 0, , LCX 29,    • Hypertension    • Hypothyroidism    • Murmur    • PONV (postoperative nausea and vomiting)    • Seasonal allergies        Past Surgical History   Procedure Laterality Date   • Bunionectomy Left    • Colonoscopy     • Knee arthroscopy Left    • Tonsillectomy     • Total Knee Replacement, Knee Unicompartmental Arthroplasty Left 2018    Performed by Yunier Penn MD at [facility] DO NOT SELECT [facility] OR       Social History     Tobacco Use   • Smoking status: Never   • Smokeless tobacco: Never   Vaping Use   • Vaping status: Never Used   Substance Use Topics   • Alcohol use: Yes     Comment: occasionally   • Drug use: No       Family History   Problem Relation Name Age of Onset   • Hypertension Biological Mother     • Pancreatic cancer Biological Mother           at age 80   • Pancreatic cancer Biological Father           at age 52   • Heart disease Biological Brother Kelsy         hx of stents in his 50s   • Hypertension Biological Brother Kelsy    • No Known Problems Biological Brother Ranjit    • Hypertension Biological Brother Raphael        Allergies:  Dog dander    Current Outpatient Medications   Medication Sig Dispense Refill   • albuterol HFA 90 mcg/actuation inhaler INHALE 2 PUFFS BY MOUTH EVERY 6 HOURS AS NEEDED FORWHEEZING 18 g 1   • amLODIPine (NORVASC) 5 mg tablet Take 1 tablet (5 mg total) by mouth daily. 90 tablet 3   • ARMOUR THYROID 60 mg tablet TAKE 3/4 (0.75) TABLET ONCE A DAY     • budesonide-formoteroL (SYMBICORT) 160-4.5 mcg/actuation inhaler Inhale 2 puffs 2 (two) times a day. Rinse mouth with water after use to reduce aftertaste and incidence of candidiasis. Do not swallow. 10.2 g 3   • hydrochlorothiazide (HYDRODIURIL) 25 mg tablet Take 1 tablet (25 mg total) by mouth daily. 90 tablet 0   • losartan (COZAAR) 100  mg tablet TAKE 1 TABLET BY MOUTH EVERY DAY 90 tablet 1   • MAGNESIUM ORAL Take 400 mg by mouth every evening.       • rosuvastatin (CRESTOR) 20 mg tablet Take 1 tablet (20 mg total) by mouth daily. 90 tablet 3   • valACYclovir (VALTREX) 1 gram tablet Take 1 tablet (1,000 mg total) by mouth 2 (two) times a day. TAKE 1 TABLET BY MOUTH TWICE A DAY FOR 7 DAYS  Strength: 1 g 30 tablet 0     No current facility-administered medications for this visit.       Review of Systems   Cardiovascular:  Positive for dyspnea on exertion. Negative for chest pain, irregular heartbeat, leg swelling, palpitations and syncope.   Respiratory:  Negative for shortness of breath.    Hematologic/Lymphatic: Does not bruise/bleed easily.   Neurological:  Positive for light-headedness. Negative for dizziness.       Objective    Vitals:    11/20/24 1430   BP: 112/70   Pulse: (!) 52   SpO2: 99%       Wt Readings from Last 5 Encounters:   11/20/24 60.8 kg (134 lb)   11/20/24 60.8 kg (134 lb)   11/06/24 60.8 kg (134 lb)   11/06/24 60.8 kg (134 lb)   10/30/24 60.8 kg (134 lb)       Body mass index is 24.51 kg/m².    Physical Exam  Vitals reviewed.   Constitutional:       General: She is not in acute distress.     Appearance: Normal appearance.   HENT:      Head: Normocephalic and atraumatic.   Eyes:      General: No scleral icterus.  Neck:      Vascular: No carotid bruit.   Cardiovascular:      Rate and Rhythm: Normal rate and regular rhythm.      Heart sounds: S1 normal and S2 normal. No murmur heard.     No S3 or S4 sounds.   Pulmonary:      Effort: Pulmonary effort is normal.      Breath sounds: Normal breath sounds.   Musculoskeletal:      Right lower leg: No edema.      Left lower leg: No edema.   Skin:     General: Skin is warm and dry.   Neurological:      General: No focal deficit present.      Mental Status: She is alert. Mental status is at baseline.   Psychiatric:         Mood and Affect: Mood normal.         Thought Content: Thought  content normal.         Judgment: Judgment normal.       ECG   Sinus bradycardia with 1st degree A-V block   Otherwise normal ECG   When compared with ECG of 30-OCT-2024 15:20,   No significant change was found     Hematology  Lab Results   Component Value Date    WBC 4.59 02/06/2024    HGB 12.1 02/06/2024    HCT 38.4 02/06/2024     02/06/2024       Chemistries  Lab Results   Component Value Date     04/25/2024    K 4.3 04/25/2024     04/25/2024    CREATININE 1.1 04/25/2024    BUN 32 (H) 04/25/2024    CO2 29 04/25/2024    GLUCOSE 92 04/25/2024    CALCIUM 9.2 04/25/2024    ALT 29 04/25/2024    AST 39 04/25/2024       Cholesterol  Lab Results   Component Value Date    CHOL 177 04/25/2024    TRIG 87 04/25/2024    HDL 89 04/25/2024    LDLCALC 71 04/25/2024    NONHDLCALC 88 04/25/2024       Endocrine  Lab Results   Component Value Date    TSH 0.66 02/06/2024    FREET4 0.84 02/06/2024       Cardiac Imaging  Cardiac Imaging    TRANSTHORACIC ECHO (TTE) COMPLETE 11/20/2024    Interpretation Summary  •  Left Ventricle: Normal ventricle size. Normal wall thickness. Normal systolic function. Estimated EF 60-65%.  •  Right Ventricle: Normal ventricle size. Normal systolic function.  •  Left Atrium: Normal sized atrium. Normal Doppler flow of pulmonary vein(s).  •  Right Atrium: Normal sized atrium.  •  Pulmonic Valve: Normal structure. Trace regurgitation. No stenosis.  •  Tricuspid Valve: Normal structure. Mild regurgitation. Estimated RVSP = 27 mmHg. No significant stenosis.  •  Mitral Valve: Sclerotic mitral valve. Normal leaflet motion. Mild regurgitation. No stenosis. Mean gradient = 1.00.  •  Aortic Valve: Tricuspid valve.  Sclerotic leaflets. Trace regurgitation. No stenosis. Calculated dimensionless index = 0.76.  •  Pericardium: Normal structure. No evidence of pericardial effusion.  •  IVC/SVC: Inferior vena cava is <2.1cm. Inferior vena cava collapses >50% during inspiration.  •  Aorta: Aortic root  normal. Sinuses of Valsalva normal-sized. Mild dilatation of the ascending aorta.          Most recent stress test results:    CV NM STRESS EXERCISE WITH MYOC PERF SPECT MULTI 11/06/2024 (Final) 11/6/2024    Interpretation Summary  •  Study Impression: Study discordancy with positive ECG and negative SPECT. Left ventricular perfusion normal. Stress EKG is abnormal.  •  Stress Findings: An exercise stress test was performed following the Conrad protocol. The patient demonstrated good exercise capacity. The patient reported no symptoms during the stress test. The patient reached stage 4. The patient experienced fatigue, subsequently the patient requested the test to be stopped. Blood pressure and heart rate demonstrated a physiologic response to exercise. The patient experienced no angina calculating an angina index of 0.  •  Response to Stress: ECG was diagnostic. There is 1.0 mm upward-sloping ST segment depression in the inferior and lateral (II, III, aVF, V5 and V6) leads noted during stress, beginning at 8 minute(s) 50 seconds of stress. Arrhythmias noted during stress:  rare PVCs. ST segments returned to baseline by 1 min of recovery. There were no arrhythmias during recovery.  •  Stress LV Summary: Post stress ejection fraction was 79%.  Global LV function is normal.  Normal-sized left ventricle.   There were no stress induced regional wall motion abnormalities.  No transient ischemic dilation present.  •  Exercise duration 9 minutes and 33 seconds, 11.9 METS.  [facility] treadmill score 4, intermediate risk study.        Assessment/Plan    Agatston coronary artery calcium score greater than 400  - Exercise nuclear stress 10/30/24: Stress discordancy with positive stress EKG and normal perfusion study.   - TTE 11/20/2024: EF 60 to 65%, NWMA, LV normal, RV normal, trace AI  -Her resting EKG has no ischemic changes and she denies chest discomfort that is suggestive of angina.  - Discordancy noticed in her stress test  likely either false positive or possibly consistent with small vessel disease.  - Continue risk factor modification with rosuvastatin.  - Check FLP and LP(a).    Primary hypertension  - Her blood pressure is reasonably controlled.  - Continue same dose of amlodipine, hydrochlorothiazide and losartan.  - In addition to medications, she should maintain a low-sodium diet.  - She was encouraged to stay well-hydrated with at least 64 ounces of water daily.    Pure hypercholesterolemia  FLP 4/25/2024: , TG 87, HDL 89, LDL 71  - Continue same dose of rosuvastatin.  - Check FLP and LP(a).      New Medications Ordered This Visit   Medications   • hydrochlorothiazide (HYDRODIURIL) 25 mg tablet     Sig: Take 1 tablet (25 mg total) by mouth daily.     Dispense:  90 tablet     Refill:  0       Medications Discontinued During This Encounter   Medication Reason   • hydrochlorothiazide 12.5 mg tablet    • hydrochlorothiazide 12.5 mg tablet        Orders Placed This Encounter   Procedures   • Lipid panel     Standing Status:   Future     Standing Expiration Date:   11/20/2025     Order Specific Question:   Release to patient     Answer:   Immediate [1]   • Lipoprotein (a)     Standing Status:   Future     Standing Expiration Date:   11/20/2025     Order Specific Question:   Release to patient     Answer:   Immediate [1]   • [facility] MUSE ECG 12 lead (clinic performed)     Scheduling Instructions:      PLEASE USE THIS ORDER FOR ECG'S PERFORMED IN PHYSICIAN OFFICES     Order Specific Question:   Release to patient     Answer:   Immediate [1]       Follow Up Plans:  Return in about 6 months (around 5/20/2025).              TERESA Reyes  11/20/2024

## 2024-11-20 NOTE — PROGRESS NOTES
Cardiology  Office Progress Note         Reason for visit:   Chief Complaint   Patient presents with    Follow-up       HPI        Betina Malhotra is a 68 y.o. female who presents to the office for cardiovascular follow up and management of CAD (CACS 2/1/24 - 751: LM 0, , LCX 29, ), hypertension and mitral insufficiency.    She was last seen in the office 10/30/2024 by Dr. Veronica.  At the time of her last visit, she reported occasional shortness of breath with stairs and inclines as well as some positional lightheadedness.  She was to have an exercise nuclear stress test and resting echocardiogram.  To follow-up in 6 months.    Exercise nuclear stress 10/30/24: Stress discordancy with positive stress EKG and normal perfusion study.    TTE 11/20/2024: EF 60 to 65%, NWMA, LV normal, RV normal, trace AI    Today:    She reports that she has been feling well. She can get some shortness of breath with climbing stairs that is unchanged. She continues to exercise regularlay with cardio and light weights, if she feels short of breath she will use her inhaler and this will resolve. She reports some intermittent lightheadedness with position changes but admits that she does not drink much water. She denies chest pain/discomfort, palpitations, feeling  syncope and LE edema. She checks her BP on occasion and reports taht this is usually at goal.       Past Medical History:   Diagnosis Date    Anemia     Arthritis     Asthma     Coronary artery disease     CACS 2/1/24 - 751:  LM 0, , LCX 29,     Hypertension     Hypothyroidism     Murmur     PONV (postoperative nausea and vomiting)     Seasonal allergies        Past Surgical History   Procedure Laterality Date    Bunionectomy Left     Colonoscopy      Knee arthroscopy Left     Tonsillectomy      Total Knee Replacement, Knee Unicompartmental Arthroplasty Left 8/29/2018    Performed by Yunier Penn MD at [facility] [place] DO NOT SELECT [facility] OR        Social History     Tobacco Use    Smoking status: Never    Smokeless tobacco: Never   Vaping Use    Vaping status: Never Used   Substance Use Topics    Alcohol use: Yes     Comment: occasionally    Drug use: No       Family History   Problem Relation Name Age of Onset    Hypertension Biological Mother      Pancreatic cancer Biological Mother           at age 80    Pancreatic cancer Biological Father           at age 52    Heart disease Biological Brother Kelsy         hx of stents in his 50s    Hypertension Biological Brother Kelsy     No Known Problems Biological Brother Kip     Hypertension Biological Brother Raphael        Allergies:  Dog dander    Current Outpatient Medications   Medication Sig Dispense Refill    albuterol HFA 90 mcg/actuation inhaler INHALE 2 PUFFS BY MOUTH EVERY 6 HOURS AS NEEDED FORWHEEZING 18 g 1    amLODIPine (NORVASC) 5 mg tablet Take 1 tablet (5 mg total) by mouth daily. 90 tablet 3    ARMOUR THYROID 60 mg tablet TAKE 3/4 (0.75) TABLET ONCE A DAY      budesonide-formoteroL (SYMBICORT) 160-4.5 mcg/actuation inhaler Inhale 2 puffs 2 (two) times a day. Rinse mouth with water after use to reduce aftertaste and incidence of candidiasis. Do not swallow. 10.2 g 3    hydrochlorothiazide (HYDRODIURIL) 25 mg tablet Take 1 tablet (25 mg total) by mouth daily. 90 tablet 0    losartan (COZAAR) 100 mg tablet TAKE 1 TABLET BY MOUTH EVERY DAY 90 tablet 1    MAGNESIUM ORAL Take 400 mg by mouth every evening.        rosuvastatin (CRESTOR) 20 mg tablet Take 1 tablet (20 mg total) by mouth daily. 90 tablet 3    valACYclovir (VALTREX) 1 gram tablet Take 1 tablet (1,000 mg total) by mouth 2 (two) times a day. TAKE 1 TABLET BY MOUTH TWICE A DAY FOR 7 DAYS  Strength: 1 g 30 tablet 0     No current facility-administered medications for this visit.       Review of Systems   Cardiovascular:  Positive for dyspnea on exertion. Negative for chest pain, irregular heartbeat, leg swelling, palpitations and  syncope.   Respiratory:  Negative for shortness of breath.    Hematologic/Lymphatic: Does not bruise/bleed easily.   Neurological:  Positive for light-headedness. Negative for dizziness.       Objective     Vitals:    11/20/24 1430   BP: 112/70   Pulse: (!) 52   SpO2: 99%       Wt Readings from Last 5 Encounters:   11/20/24 60.8 kg (134 lb)   11/20/24 60.8 kg (134 lb)   11/06/24 60.8 kg (134 lb)   11/06/24 60.8 kg (134 lb)   10/30/24 60.8 kg (134 lb)       Body mass index is 24.51 kg/m².    Physical Exam  Vitals reviewed.   Constitutional:       General: She is not in acute distress.     Appearance: Normal appearance.   HENT:      Head: Normocephalic and atraumatic.   Eyes:      General: No scleral icterus.  Neck:      Vascular: No carotid bruit.   Cardiovascular:      Rate and Rhythm: Normal rate and regular rhythm.      Heart sounds: S1 normal and S2 normal. No murmur heard.     No S3 or S4 sounds.   Pulmonary:      Effort: Pulmonary effort is normal.      Breath sounds: Normal breath sounds.   Musculoskeletal:      Right lower leg: No edema.      Left lower leg: No edema.   Skin:     General: Skin is warm and dry.   Neurological:      General: No focal deficit present.      Mental Status: She is alert. Mental status is at baseline.   Psychiatric:         Mood and Affect: Mood normal.         Thought Content: Thought content normal.         Judgment: Judgment normal.       ECG   Sinus bradycardia with 1st degree A-V block   Otherwise normal ECG   When compared with ECG of 30-OCT-2024 15:20,   No significant change was found     Hematology  Lab Results   Component Value Date    WBC 4.59 02/06/2024    HGB 12.1 02/06/2024    HCT 38.4 02/06/2024     02/06/2024       Chemistries  Lab Results   Component Value Date     04/25/2024    K 4.3 04/25/2024     04/25/2024    CREATININE 1.1 04/25/2024    BUN 32 (H) 04/25/2024    CO2 29 04/25/2024    GLUCOSE 92 04/25/2024    CALCIUM 9.2 04/25/2024    ALT 29  04/25/2024    AST 39 04/25/2024       Cholesterol  Lab Results   Component Value Date    CHOL 177 04/25/2024    TRIG 87 04/25/2024    HDL 89 04/25/2024    LDLCALC 71 04/25/2024    NONHDLCALC 88 04/25/2024       Endocrine  Lab Results   Component Value Date    TSH 0.66 02/06/2024    FREET4 0.84 02/06/2024       Cardiac Imaging  Cardiac Imaging    TRANSTHORACIC ECHO (TTE) COMPLETE 11/20/2024    Interpretation Summary    Left Ventricle: Normal ventricle size. Normal wall thickness. Normal systolic function. Estimated EF 60-65%.    Right Ventricle: Normal ventricle size. Normal systolic function.    Left Atrium: Normal sized atrium. Normal Doppler flow of pulmonary vein(s).    Right Atrium: Normal sized atrium.    Pulmonic Valve: Normal structure. Trace regurgitation. No stenosis.    Tricuspid Valve: Normal structure. Mild regurgitation. Estimated RVSP = 27 mmHg. No significant stenosis.    Mitral Valve: Sclerotic mitral valve. Normal leaflet motion. Mild regurgitation. No stenosis. Mean gradient = 1.00.    Aortic Valve: Tricuspid valve.  Sclerotic leaflets. Trace regurgitation. No stenosis. Calculated dimensionless index = 0.76.    Pericardium: Normal structure. No evidence of pericardial effusion.    IVC/SVC: Inferior vena cava is <2.1cm. Inferior vena cava collapses >50% during inspiration.    Aorta: Aortic root normal. Sinuses of Valsalva normal-sized. Mild dilatation of the ascending aorta.          Most recent stress test results:    CV NM STRESS EXERCISE WITH MYOC PERF SPECT MULTI 11/06/2024 (Final) 11/6/2024    Interpretation Summary    Study Impression: Study discordancy with positive ECG and negative SPECT. Left ventricular perfusion normal. Stress EKG is abnormal.    Stress Findings: An exercise stress test was performed following the Conrad protocol. The patient demonstrated good exercise capacity. The patient reported no symptoms during the stress test. The patient reached stage 4. The patient experienced  fatigue, subsequently the patient requested the test to be stopped. Blood pressure and heart rate demonstrated a physiologic response to exercise. The patient experienced no angina calculating an angina index of 0.    Response to Stress: ECG was diagnostic. There is 1.0 mm upward-sloping ST segment depression in the inferior and lateral (II, III, aVF, V5 and V6) leads noted during stress, beginning at 8 minute(s) 50 seconds of stress. Arrhythmias noted during stress:  rare PVCs. ST segments returned to baseline by 1 min of recovery. There were no arrhythmias during recovery.    Stress LV Summary: Post stress ejection fraction was 79%.  Global LV function is normal.  Normal-sized left ventricle.   There were no stress induced regional wall motion abnormalities.  No transient ischemic dilation present.    Exercise duration 9 minutes and 33 seconds, 11.9 METS.  [facility] treadmill score 4, intermediate risk study.        Assessment/Plan     Agatston coronary artery calcium score greater than 400  - Exercise nuclear stress 10/30/24: Stress discordancy with positive stress EKG and normal perfusion study.   - TTE 11/20/2024: EF 60 to 65%, NWMA, LV normal, RV normal, trace AI  -Her resting EKG has no ischemic changes and she denies chest discomfort that is suggestive of angina.  - Discordancy noticed in her stress test likely either false positive or possibly consistent with small vessel disease.  - Continue risk factor modification with rosuvastatin.  - Check FLP and LP(a).    Primary hypertension  - Her blood pressure is reasonably controlled.  - Continue same dose of amlodipine, hydrochlorothiazide and losartan.  - In addition to medications, she should maintain a low-sodium diet.  - She was encouraged to stay well-hydrated with at least 64 ounces of water daily.    Pure hypercholesterolemia  FLP 4/25/2024: , TG 87, HDL 89, LDL 71  - Continue same dose of rosuvastatin.  - Check FLP and LP(a).      New Medications  Ordered This Visit   Medications    hydrochlorothiazide (HYDRODIURIL) 25 mg tablet     Sig: Take 1 tablet (25 mg total) by mouth daily.     Dispense:  90 tablet     Refill:  0       Medications Discontinued During This Encounter   Medication Reason    hydrochlorothiazide 12.5 mg tablet     hydrochlorothiazide 12.5 mg tablet        Orders Placed This Encounter   Procedures    Lipid panel     Standing Status:   Future     Standing Expiration Date:   11/20/2025     Order Specific Question:   Release to patient     Answer:   Immediate [1]    Lipoprotein (a)     Standing Status:   Future     Standing Expiration Date:   11/20/2025     Order Specific Question:   Release to patient     Answer:   Immediate [1]    [facility] MUSE ECG 12 lead (clinic performed)     Scheduling Instructions:      PLEASE USE THIS ORDER FOR ECG'S PERFORMED IN PHYSICIAN OFFICES     Order Specific Question:   Release to patient     Answer:   Immediate [1]       Follow Up Plans:  Return in about 6 months (around 5/20/2025).              TERESA Reyes  11/20/2024

## 2024-11-20 NOTE — ASSESSMENT & PLAN NOTE
- Her blood pressure is reasonably controlled.  - Continue same dose of amlodipine, hydrochlorothiazide and losartan.  - In addition to medications, she should maintain a low-sodium diet.  - She was encouraged to stay well-hydrated with at least 64 ounces of water daily.

## 2024-11-26 ENCOUNTER — APPOINTMENT (OUTPATIENT)
Dept: LAB | Age: 69
End: 2024-11-26
Attending: NURSE PRACTITIONER
Payer: MEDICARE

## 2024-11-26 DIAGNOSIS — E78.00 PURE HYPERCHOLESTEROLEMIA: ICD-10-CM

## 2024-11-26 LAB
CHOLEST SERPL-MCNC: 158 MG/DL
HDLC SERPL-MCNC: 96 MG/DL
HDLC SERPL: 1.6 {RATIO}
LDLC SERPL CALC-MCNC: 52 MG/DL
NONHDLC SERPL-MCNC: 62 MG/DL
TRIGL SERPL-MCNC: 49 MG/DL

## 2024-11-26 PROCEDURE — 80061 LIPID PANEL: CPT

## 2024-11-26 PROCEDURE — 36415 COLL VENOUS BLD VENIPUNCTURE: CPT

## 2024-11-26 PROCEDURE — 83695 ASSAY OF LIPOPROTEIN(A): CPT

## 2024-11-27 LAB — LPA SERPL-SCNC: <10 NMOL/L

## 2024-11-29 ENCOUNTER — TELEPHONE (OUTPATIENT)
Dept: CARDIOLOGY | Facility: CLINIC | Age: 69
End: 2024-11-29
Payer: MEDICARE

## 2024-11-29 NOTE — TELEPHONE ENCOUNTER
----- Message from Miryam Quintanilla sent at 11/29/2024  2:37 PM EST -----  Can you please let patient know that her LDL was at goal and her lipoprotein a (the genetic bad cholesterol) was normal so she can continue her same medications at this time. Thanks

## 2024-11-29 NOTE — TELEPHONE ENCOUNTER
Left non-urgent vm for pt letting her know her LDL was at goal and her lipoprotein (a) was normal, so she can continue her same medications at this time per TERESA Calderón.    If any questions, left main number for office.

## 2025-02-10 RX ORDER — ROSUVASTATIN CALCIUM 20 MG/1
20 TABLET, COATED ORAL DAILY
Qty: 90 TABLET | Refills: 3 | Status: SHIPPED | OUTPATIENT
Start: 2025-02-10

## 2025-02-10 NOTE — TELEPHONE ENCOUNTER
[facility] at [facility] Refill Request      MA Notes:      Nurse Notes:      Last Office Visit: 10/30/2024  Last Telemedicine Visit: Visit date not found    Next Office Visit: 05/13/2025  Next Telemedicine Visit: Visit date not found     Most Recent BP Readings:  BP Readings from Last 3 Encounters:   11/20/24 112/64   11/20/24 112/70   10/30/24 126/74       Most recent Lab results:  Lab Results   Component Value Date    CHOL 158 11/26/2024    HDL 96 11/26/2024    TRIG 49 11/26/2024    NONHDLCALC 62 11/26/2024       Lab Results   Component Value Date    AST 39 04/25/2024    ALT 29 04/25/2024       Lab Results   Component Value Date     04/25/2024    K 4.3 04/25/2024    BUN 32 (H) 04/25/2024    CREATININE 1.1 04/25/2024       Current Medications:    Current Outpatient Medications:     albuterol HFA 90 mcg/actuation inhaler, INHALE 2 PUFFS BY MOUTH EVERY 6 HOURS AS NEEDED FORWHEEZING, Disp: 18 g, Rfl: 1    amLODIPine (NORVASC) 5 mg tablet, Take 1 tablet (5 mg total) by mouth daily., Disp: 90 tablet, Rfl: 3    ARMOUR THYROID 60 mg tablet, TAKE 3/4 (0.75) TABLET ONCE A DAY, Disp: , Rfl:     budesonide-formoteroL (SYMBICORT) 160-4.5 mcg/actuation inhaler, Inhale 2 puffs 2 (two) times a day. Rinse mouth with water after use to reduce aftertaste and incidence of candidiasis. Do not swallow., Disp: 10.2 g, Rfl: 3    hydrochlorothiazide (HYDRODIURIL) 25 mg tablet, Take 1 tablet (25 mg total) by mouth daily., Disp: 90 tablet, Rfl: 0    losartan (COZAAR) 100 mg tablet, TAKE 1 TABLET BY MOUTH EVERY DAY, Disp: 90 tablet, Rfl: 1    MAGNESIUM ORAL, Take 400 mg by mouth every evening.  , Disp: , Rfl:     rosuvastatin (CRESTOR) 20 mg tablet, Take 1 tablet (20 mg total) by mouth daily., Disp: 90 tablet, Rfl: 3    valACYclovir (VALTREX) 1 gram tablet, Take 1 tablet (1,000 mg total) by mouth 2 (two) times a day. TAKE 1 TABLET BY MOUTH TWICE A DAY FOR 7 DAYS Strength: 1 g, Disp: 30 tablet, Rfl: 0    Patient said the doctor gave him a script for a walker.  The insurance company needs the doctor to contact them to explain why patient needs a walker.  Please call [employer] at [phone].  Please call if you have any questions.

## 2025-02-19 NOTE — TELEPHONE ENCOUNTER
[facility] at [facility] Refill Request      MA Notes:      Nurse Notes:      Last Office Visit: 11/20/2024  Last Telemedicine Visit: Visit date not found    Next Office Visit: Visit date not found  Next Telemedicine Visit: Visit date not found     Most Recent BP Readings:  BP Readings from Last 3 Encounters:   11/20/24 112/64   11/20/24 112/70   10/30/24 126/74       Most recent Lab results:  Lab Results   Component Value Date    CHOL 158 11/26/2024    HDL 96 11/26/2024    TRIG 49 11/26/2024    NONHDLCALC 62 11/26/2024       Lab Results   Component Value Date    AST 39 04/25/2024    ALT 29 04/25/2024       Lab Results   Component Value Date     04/25/2024    K 4.3 04/25/2024    BUN 32 (H) 04/25/2024    CREATININE 1.1 04/25/2024       Current Medications:    Current Outpatient Medications:     albuterol HFA 90 mcg/actuation inhaler, INHALE 2 PUFFS BY MOUTH EVERY 6 HOURS AS NEEDED FORWHEEZING, Disp: 18 g, Rfl: 1    amLODIPine (NORVASC) 5 mg tablet, Take 1 tablet (5 mg total) by mouth daily., Disp: 90 tablet, Rfl: 3    ARMOUR THYROID 60 mg tablet, TAKE 3/4 (0.75) TABLET ONCE A DAY, Disp: , Rfl:     budesonide-formoteroL (SYMBICORT) 160-4.5 mcg/actuation inhaler, Inhale 2 puffs 2 (two) times a day. Rinse mouth with water after use to reduce aftertaste and incidence of candidiasis. Do not swallow., Disp: 10.2 g, Rfl: 3    hydrochlorothiazide (HYDRODIURIL) 25 mg tablet, Take 1 tablet (25 mg total) by mouth daily., Disp: 90 tablet, Rfl: 0    losartan (COZAAR) 100 mg tablet, TAKE 1 TABLET BY MOUTH EVERY DAY, Disp: 90 tablet, Rfl: 1    MAGNESIUM ORAL, Take 400 mg by mouth every evening.  , Disp: , Rfl:     rosuvastatin (CRESTOR) 20 mg tablet, TAKE 1 TABLET BY MOUTH EVERY DAY, Disp: 90 tablet, Rfl: 3    valACYclovir (VALTREX) 1 gram tablet, Take 1 tablet (1,000 mg total) by mouth 2 (two) times a day. TAKE 1 TABLET BY MOUTH TWICE A DAY FOR 7 DAYS Strength: 1 g, Disp: 30 tablet, Rfl: 0

## 2025-02-20 RX ORDER — HYDROCHLOROTHIAZIDE 25 MG/1
25 TABLET ORAL DAILY
Qty: 90 TABLET | Refills: 0 | Status: SHIPPED | OUTPATIENT
Start: 2025-02-20

## 2025-04-03 RX ORDER — HYDROCHLOROTHIAZIDE 25 MG/1
25 TABLET ORAL DAILY
Qty: 90 TABLET | Refills: 0 | Status: SHIPPED | OUTPATIENT
Start: 2025-04-03

## 2025-04-03 RX ORDER — VALACYCLOVIR HYDROCHLORIDE 1 G/1
1000 TABLET, FILM COATED ORAL 2 TIMES DAILY
Qty: 30 TABLET | Refills: 0 | Status: SHIPPED | OUTPATIENT
Start: 2025-04-03

## 2025-04-03 NOTE — TELEPHONE ENCOUNTER
[facility] Group at [facility] Refill Request      MA Notes:      Nurse Notes:      Last Office Visit: 11/20/2024  Last Telemedicine Visit: Visit date not found    Next Office Visit: 5/13/2025  Next Telemedicine Visit: Visit date not found     Most Recent BP Readings:  BP Readings from Last 3 Encounters:   11/20/24 112/64   11/20/24 112/70   10/30/24 126/74       Most recent Lab results:  Lab Results   Component Value Date    CHOL 158 11/26/2024    HDL 96 11/26/2024    TRIG 49 11/26/2024    NONHDLCALC 62 11/26/2024       Lab Results   Component Value Date    AST 39 04/25/2024    ALT 29 04/25/2024       Lab Results   Component Value Date     04/25/2024    K 4.3 04/25/2024    BUN 32 (H) 04/25/2024    CREATININE 1.1 04/25/2024       Current Medications:    Current Outpatient Medications:     albuterol HFA 90 mcg/actuation inhaler, INHALE 2 PUFFS BY MOUTH EVERY 6 HOURS AS NEEDED FORWHEEZING, Disp: 18 g, Rfl: 1    amLODIPine (NORVASC) 5 mg tablet, Take 1 tablet (5 mg total) by mouth daily., Disp: 90 tablet, Rfl: 3    ARMOUR THYROID 60 mg tablet, TAKE 3/4 (0.75) TABLET ONCE A DAY, Disp: , Rfl:     budesonide-formoteroL (SYMBICORT) 160-4.5 mcg/actuation inhaler, Inhale 2 puffs 2 (two) times a day. Rinse mouth with water after use to reduce aftertaste and incidence of candidiasis. Do not swallow., Disp: 10.2 g, Rfl: 3    hydrochlorothiazide (HYDRODIURIL) 25 mg tablet, TAKE 1 TABLET (25 MG TOTAL) BY MOUTH DAILY., Disp: 90 tablet, Rfl: 0    losartan (COZAAR) 100 mg tablet, TAKE 1 TABLET BY MOUTH EVERY DAY, Disp: 90 tablet, Rfl: 1    MAGNESIUM ORAL, Take 400 mg by mouth every evening.  , Disp: , Rfl:     rosuvastatin (CRESTOR) 20 mg tablet, TAKE 1 TABLET BY MOUTH EVERY DAY, Disp: 90 tablet, Rfl: 3    valACYclovir (VALTREX) 1 gram tablet, Take 1 tablet (1,000 mg total) by mouth 2 (two) times a day. TAKE 1 TABLET BY MOUTH TWICE A DAY FOR 7 DAYS Strength: 1 g, Disp: 30 tablet, Rfl: 0

## 2025-04-04 RX ORDER — SULFAMETHOXAZOLE AND TRIMETHOPRIM 800; 160 MG/1; MG/1
60 TABLET ORAL DAILY
Qty: 90 TABLET | Refills: 1 | Status: SHIPPED | OUTPATIENT
Start: 2025-04-04

## 2025-05-05 RX ORDER — LOSARTAN POTASSIUM 100 MG/1
100 TABLET ORAL DAILY
Qty: 90 TABLET | Refills: 1 | Status: SHIPPED | OUTPATIENT
Start: 2025-05-05

## 2025-05-13 ENCOUNTER — OFFICE VISIT (OUTPATIENT)
Dept: CARDIOLOGY | Facility: CLINIC | Age: 70
End: 2025-05-13
Payer: MEDICARE

## 2025-05-13 VITALS
BODY MASS INDEX: 23.19 KG/M2 | OXYGEN SATURATION: 99 % | WEIGHT: 126 LBS | HEIGHT: 62 IN | SYSTOLIC BLOOD PRESSURE: 116 MMHG | DIASTOLIC BLOOD PRESSURE: 78 MMHG | HEART RATE: 66 BPM

## 2025-05-13 DIAGNOSIS — E78.2 MIXED HYPERLIPIDEMIA: ICD-10-CM

## 2025-05-13 DIAGNOSIS — R93.1 AGATSTON CORONARY ARTERY CALCIUM SCORE GREATER THAN 400: ICD-10-CM

## 2025-05-13 DIAGNOSIS — I10 PRIMARY HYPERTENSION: ICD-10-CM

## 2025-05-13 DIAGNOSIS — R01.1 MURMUR: ICD-10-CM

## 2025-05-13 DIAGNOSIS — E78.00 PURE HYPERCHOLESTEROLEMIA: ICD-10-CM

## 2025-05-13 DIAGNOSIS — I25.10 CORONARY ARTERY CALCIFICATION: ICD-10-CM

## 2025-05-13 DIAGNOSIS — J45.20 MILD INTERMITTENT ASTHMA WITHOUT COMPLICATION: ICD-10-CM

## 2025-05-13 DIAGNOSIS — R07.89 CHEST DISCOMFORT: ICD-10-CM

## 2025-05-13 DIAGNOSIS — I34.0 MITRAL VALVE INSUFFICIENCY, UNSPECIFIED ETIOLOGY: Primary | ICD-10-CM

## 2025-05-13 DIAGNOSIS — E03.9 ACQUIRED HYPOTHYROIDISM: ICD-10-CM

## 2025-05-13 LAB
ATRIAL RATE: 67
P AXIS: 79
PR INTERVAL: 208
QRS DURATION: 82
QT INTERVAL: 410
QTC CALCULATION(BAZETT): 433
R AXIS: 47
T WAVE AXIS: 94
VENTRICULAR RATE: 67

## 2025-05-13 PROCEDURE — G8752 SYS BP LESS 140: HCPCS | Performed by: INTERNAL MEDICINE

## 2025-05-13 PROCEDURE — 93000 ELECTROCARDIOGRAM COMPLETE: CPT | Performed by: INTERNAL MEDICINE

## 2025-05-13 PROCEDURE — 99214 OFFICE O/P EST MOD 30 MIN: CPT | Performed by: INTERNAL MEDICINE

## 2025-05-13 PROCEDURE — G8754 DIAS BP LESS 90: HCPCS | Performed by: INTERNAL MEDICINE

## 2025-05-13 PROCEDURE — G2211 COMPLEX E/M VISIT ADD ON: HCPCS | Performed by: INTERNAL MEDICINE

## 2025-08-11 ENCOUNTER — OFFICE VISIT (OUTPATIENT)
Dept: PRIMARY CARE | Facility: CLINIC | Age: 70
End: 2025-08-11
Payer: MEDICARE

## 2025-08-11 VITALS
WEIGHT: 125 LBS | OXYGEN SATURATION: 98 % | BODY MASS INDEX: 23 KG/M2 | HEIGHT: 62 IN | SYSTOLIC BLOOD PRESSURE: 130 MMHG | HEART RATE: 62 BPM | TEMPERATURE: 97.6 F | DIASTOLIC BLOOD PRESSURE: 70 MMHG | RESPIRATION RATE: 14 BRPM

## 2025-08-11 DIAGNOSIS — E78.00 PURE HYPERCHOLESTEROLEMIA: ICD-10-CM

## 2025-08-11 DIAGNOSIS — I10 PRIMARY HYPERTENSION: Primary | ICD-10-CM

## 2025-08-11 DIAGNOSIS — Z12.11 COLON CANCER SCREENING: ICD-10-CM

## 2025-08-11 DIAGNOSIS — Z00.00 ROUTINE ADULT HEALTH MAINTENANCE: ICD-10-CM

## 2025-08-11 DIAGNOSIS — J45.20 MILD INTERMITTENT ASTHMA WITHOUT COMPLICATION: ICD-10-CM

## 2025-08-11 DIAGNOSIS — Z12.31 ENCOUNTER FOR SCREENING MAMMOGRAM FOR MALIGNANT NEOPLASM OF BREAST: ICD-10-CM

## 2025-08-11 DIAGNOSIS — E03.9 ACQUIRED HYPOTHYROIDISM: ICD-10-CM

## 2025-08-11 PROCEDURE — G8752 SYS BP LESS 140: HCPCS | Performed by: NURSE PRACTITIONER

## 2025-08-11 PROCEDURE — G8754 DIAS BP LESS 90: HCPCS | Performed by: NURSE PRACTITIONER

## 2025-08-11 PROCEDURE — 99214 OFFICE O/P EST MOD 30 MIN: CPT | Performed by: NURSE PRACTITIONER

## 2025-08-11 RX ORDER — NITROFURANTOIN 25; 75 MG/1; MG/1
100 CAPSULE ORAL AS NEEDED
Qty: 20 CAPSULE | Refills: 0 | Status: SHIPPED | OUTPATIENT
Start: 2025-08-11 | End: 2025-08-31

## 2025-08-11 ASSESSMENT — ENCOUNTER SYMPTOMS
ABDOMINAL DISTENTION: 0
SORE THROAT: 0
BRUISES/BLEEDS EASILY: 0
SLEEP DISTURBANCE: 0
SINUS PAIN: 0
DECREASED CONCENTRATION: 0
SHORTNESS OF BREATH: 0
APPETITE CHANGE: 0
BACK PAIN: 0
WEAKNESS: 0
FEVER: 0
CONSTIPATION: 0
NECK STIFFNESS: 0
NECK PAIN: 0
ABDOMINAL PAIN: 0
SINUS PRESSURE: 0
ACTIVITY CHANGE: 0
NAUSEA: 0
TROUBLE SWALLOWING: 0
ARTHRALGIAS: 0
RHINORRHEA: 0
EYE ITCHING: 0
FATIGUE: 0
COUGH: 0
EYE DISCHARGE: 0
NUMBNESS: 0
NERVOUS/ANXIOUS: 0
MYALGIAS: 0
FREQUENCY: 0
DIFFICULTY URINATING: 0
HEADACHES: 0
DIARRHEA: 0

## 2025-08-11 ASSESSMENT — PATIENT HEALTH QUESTIONNAIRE - PHQ9: SUM OF ALL RESPONSES TO PHQ9 QUESTIONS 1 & 2: 0

## 2025-08-21 ENCOUNTER — HOSPITAL ENCOUNTER (OUTPATIENT)
Dept: RADIOLOGY | Age: 70
Discharge: HOME | End: 2025-08-21
Attending: NURSE PRACTITIONER
Payer: MEDICARE

## 2025-08-21 DIAGNOSIS — Z12.31 ENCOUNTER FOR SCREENING MAMMOGRAM FOR MALIGNANT NEOPLASM OF BREAST: ICD-10-CM

## 2025-08-21 PROCEDURE — 77063 BREAST TOMOSYNTHESIS BI: CPT

## 2025-08-22 ENCOUNTER — APPOINTMENT (OUTPATIENT)
Dept: LAB | Age: 70
End: 2025-08-22
Attending: NURSE PRACTITIONER
Payer: MEDICARE

## 2025-08-22 DIAGNOSIS — J45.20 MILD INTERMITTENT ASTHMA WITHOUT COMPLICATION: ICD-10-CM

## 2025-08-22 DIAGNOSIS — E78.00 PURE HYPERCHOLESTEROLEMIA: ICD-10-CM

## 2025-08-22 DIAGNOSIS — E03.9 ACQUIRED HYPOTHYROIDISM: ICD-10-CM

## 2025-08-22 LAB
ALBUMIN SERPL-MCNC: 4.7 G/DL (ref 3.5–5.7)
ALP SERPL-CCNC: 37 IU/L (ref 34–125)
ALT SERPL-CCNC: 33 IU/L (ref 7–52)
ANION GAP SERPL CALC-SCNC: 8 MEQ/L (ref 3–15)
AST SERPL-CCNC: 38 IU/L (ref 13–39)
BASOPHILS # BLD: 0.03 K/UL (ref 0.01–0.1)
BASOPHILS NFR BLD: 0.7 %
BILIRUB SERPL-MCNC: 1.1 MG/DL (ref 0.3–1.2)
BUN SERPL-MCNC: 34 MG/DL (ref 7–25)
CALCIUM SERPL-MCNC: 9.5 MG/DL (ref 8.6–10.3)
CHLORIDE SERPL-SCNC: 106 MEQ/L (ref 98–107)
CHOLEST SERPL-MCNC: 163 MG/DL
CO2 SERPL-SCNC: 29 MEQ/L (ref 21–31)
CREAT SERPL-MCNC: 1.1 MG/DL (ref 0.6–1.2)
DIFFERENTIAL METHOD BLD: ABNORMAL
EGFRCR SERPLBLD CKD-EPI 2021: 54.5 ML/MIN/1.73M*2
EOSINOPHIL # BLD: 0.17 K/UL (ref 0.04–0.36)
EOSINOPHIL NFR BLD: 3.7 %
ERYTHROCYTE [DISTWIDTH] IN BLOOD BY AUTOMATED COUNT: 13.2 % (ref 11.7–14.4)
GLUCOSE SERPL-MCNC: 96 MG/DL (ref 70–99)
HCT VFR BLD AUTO: 37.3 % (ref 35–45)
HDLC SERPL-MCNC: 89 MG/DL
HDLC SERPL: 1.8 {RATIO}
HGB BLD-MCNC: 12.3 G/DL (ref 11.8–15.7)
IMM GRANULOCYTES # BLD AUTO: 0.01 K/UL (ref 0–0.08)
IMM GRANULOCYTES NFR BLD AUTO: 0.2 %
LDLC SERPL CALC-MCNC: 62 MG/DL
LYMPHOCYTES # BLD: 2.13 K/UL (ref 1.2–3.5)
LYMPHOCYTES NFR BLD: 46.4 %
MCH RBC QN AUTO: 30.3 PG (ref 28–33.2)
MCHC RBC AUTO-ENTMCNC: 33 G/DL (ref 32.2–35.5)
MCV RBC AUTO: 91.9 FL (ref 83–98)
MONOCYTES # BLD: 0.46 K/UL (ref 0.28–0.8)
MONOCYTES NFR BLD: 10 %
NEUTROPHILS # BLD: 1.79 K/UL (ref 1.7–7)
NEUTS SEG NFR BLD: 39 %
NONHDLC SERPL-MCNC: 74 MG/DL
NRBC BLD-RTO: 0 %
PLATELET # BLD AUTO: 230 K/UL (ref 150–369)
PMV BLD AUTO: 13.9 FL (ref 9.4–12.3)
POTASSIUM SERPL-SCNC: 4.5 MEQ/L (ref 3.5–5.1)
PROT SERPL-MCNC: 6.5 G/DL (ref 6–8.2)
RBC # BLD AUTO: 4.06 M/UL (ref 3.93–5.22)
SODIUM SERPL-SCNC: 143 MEQ/L (ref 136–145)
T4 FREE SERPL-MCNC: 0.67 NG/DL (ref 0.58–1.64)
TRIGL SERPL-MCNC: 62 MG/DL
TSH SERPL DL<=0.05 MIU/L-ACNC: 0.78 MIU/L (ref 0.34–5.6)
WBC # BLD AUTO: 4.59 K/UL (ref 3.8–10.5)

## 2025-08-22 PROCEDURE — 80053 COMPREHEN METABOLIC PANEL: CPT

## 2025-08-22 PROCEDURE — 84443 ASSAY THYROID STIM HORMONE: CPT

## 2025-08-22 PROCEDURE — 84439 ASSAY OF FREE THYROXINE: CPT

## 2025-08-22 PROCEDURE — 85025 COMPLETE CBC W/AUTO DIFF WBC: CPT

## 2025-08-22 PROCEDURE — 36415 COLL VENOUS BLD VENIPUNCTURE: CPT

## 2025-08-22 PROCEDURE — 80061 LIPID PANEL: CPT

## (undated) DEVICE — SUTURE MONOCRYL 3-0  Y497G

## (undated) DEVICE — DRAPE SPLIT U IMPERVIOUS 89331

## (undated) DEVICE — DRESSING MEPILEX 4X8 BORDER

## (undated) DEVICE — DRAPE-U NON-STERILE

## (undated) DEVICE — MANIFOLD FOUR PORT NEPTUNE

## (undated) DEVICE — GLOVE SZ 8 PROTEXIS CLASSIC LATEX

## (undated) DEVICE — COVER LIGHTHANDLE (STERILE SINGLE PA

## (undated) DEVICE — KIT CEMENT MIXING CARTRIDGE AND NOZZLE

## (undated) DEVICE — VEST STERILE

## (undated) DEVICE — NEEDLE DISP HYPO 18GX1-1/2IN

## (undated) DEVICE — SYRINGE ONLY  LUER SLIP TIP 30ML

## (undated) DEVICE — Device

## (undated) DEVICE — BLADE SAGITTAL #152 STRYKER NARROW THICK NO OFFSET 12.5MM

## (undated) DEVICE — MARKER SURGICAL SKIN

## (undated) DEVICE — SUCTION 18FR FRAZIER DISPOSABLE

## (undated) DEVICE — SUTURE VICRYL 1 J699H OS-8 27IN UNDYED

## (undated) DEVICE — STERISTRIP 1/2INX4IN

## (undated) DEVICE — GLOVE SZ 7.5 PROTEXIS CLASSIC LATEX

## (undated) DEVICE — BLADE RECIPROCATING SAW SHORT

## (undated) DEVICE — DRAPE TOWEL 17X23 NON-STERILE

## (undated) DEVICE — HOOD FLYTE SURGICOOL

## (undated) DEVICE — PAD GROUND ELECTROSURGICAL W/CORD

## (undated) DEVICE — SUTURE VICRYL 2-0  J596H

## (undated) DEVICE — SOLN IRRIG STER WATER 1000ML

## (undated) DEVICE — BANDAGE ESMARK 6X12 LATEX FREE

## (undated) DEVICE — GLOVE SZ 8 LINER PROTEXIS PI BL

## (undated) DEVICE — ***USE 57698*** SLEEVE FLOWTRON DVT CALF SINGLE USE

## (undated) DEVICE — SET HANDPIECE INTERPULSE

## (undated) DEVICE — APPLICATOR CHLORAPREP 26ML ORANGE TINT

## (undated) DEVICE — SOLN IRRIG .9%SOD 3L

## (undated) DEVICE — BLADE SCALPEL #15

## (undated) DEVICE — BLANKET UPPER BODY